# Patient Record
Sex: MALE | Race: WHITE | NOT HISPANIC OR LATINO
[De-identification: names, ages, dates, MRNs, and addresses within clinical notes are randomized per-mention and may not be internally consistent; named-entity substitution may affect disease eponyms.]

---

## 2020-02-28 ENCOUNTER — APPOINTMENT (OUTPATIENT)
Dept: INTERNAL MEDICINE | Facility: CLINIC | Age: 65
End: 2020-02-28

## 2020-04-28 ENCOUNTER — APPOINTMENT (OUTPATIENT)
Dept: INTERNAL MEDICINE | Facility: CLINIC | Age: 65
End: 2020-04-28

## 2020-05-13 ENCOUNTER — APPOINTMENT (OUTPATIENT)
Dept: INTERNAL MEDICINE | Facility: CLINIC | Age: 65
End: 2020-05-13
Payer: MEDICARE

## 2020-05-13 ENCOUNTER — NON-APPOINTMENT (OUTPATIENT)
Age: 65
End: 2020-05-13

## 2020-05-13 VITALS
WEIGHT: 187 LBS | TEMPERATURE: 99.2 F | HEART RATE: 109 BPM | SYSTOLIC BLOOD PRESSURE: 139 MMHG | RESPIRATION RATE: 12 BRPM | OXYGEN SATURATION: 95 % | DIASTOLIC BLOOD PRESSURE: 80 MMHG | HEIGHT: 67 IN | BODY MASS INDEX: 29.35 KG/M2

## 2020-05-13 DIAGNOSIS — Z00.00 ENCOUNTER FOR GENERAL ADULT MEDICAL EXAMINATION W/OUT ABNORMAL FINDINGS: ICD-10-CM

## 2020-05-13 PROCEDURE — 99213 OFFICE O/P EST LOW 20 MIN: CPT

## 2020-05-13 NOTE — HISTORY OF PRESENT ILLNESS
[de-identified] : 64 year old male with h/o MS 1986 ( currently off  Aubagio) >following with  neurologist  Dr. García/ BPH / elevated PSA >following with urologist Dr. Awad   Q 6 months / family history Colon cancer >last colonoscopy  2 years ago ( he undergoes screening colonoscopy  does Q 3 years\par Pt reports COVID type symptoms 3 months ago > he tested positive for COVID abs  1 week ago \par He had pneumonia vaccine 2019 \par Zostavax 2016\par Pt reports intermittent chest discomfort, + associated PEARSON, palpitation . He denies dizziness, N, V, abdominal pain , CP at present

## 2020-05-13 NOTE — PHYSICAL EXAM
[No Acute Distress] : no acute distress [Well Developed] : well developed [Well Nourished] : well nourished [Well-Appearing] : well-appearing [EOMI] : extraocular movements intact [Normal Sclera/Conjunctiva] : normal sclera/conjunctiva [Normal Oropharynx] : the oropharynx was normal [Normal Outer Ear/Nose] : the outer ears and nose were normal in appearance [Normal TMs] : both tympanic membranes were normal [No JVD] : no jugular venous distention [No Lymphadenopathy] : no lymphadenopathy [Supple] : supple [No Respiratory Distress] : no respiratory distress  [No Accessory Muscle Use] : no accessory muscle use [Clear to Auscultation] : lungs were clear to auscultation bilaterally [No Murmur] : no murmur heard [Normal S1, S2] : normal S1 and S2 [Regular Rhythm] : with a regular rhythm [No Carotid Bruits] : no carotid bruits [Pedal Pulses Present] : the pedal pulses are present [No Edema] : there was no peripheral edema [Non-distended] : non-distended [Soft] : abdomen soft [Non Tender] : non-tender [No HSM] : no HSM [Normal Posterior Cervical Nodes] : no posterior cervical lymphadenopathy [Normal Bowel Sounds] : normal bowel sounds [Normal Anterior Cervical Nodes] : no anterior cervical lymphadenopathy [No CVA Tenderness] : no CVA  tenderness [No Spinal Tenderness] : no spinal tenderness [Grossly Normal Strength/Tone] : grossly normal strength/tone [No Joint Swelling] : no joint swelling [No Focal Deficits] : no focal deficits [No Rash] : no rash [Normal Gait] : normal gait [Normal Insight/Judgement] : insight and judgment were intact [de-identified] : + mild tachycardia  [Normal Affect] : the affect was normal

## 2020-05-13 NOTE — ASSESSMENT
[FreeTextEntry1] : 1. see plan\par increase Metoprolol to 50 mg QD\par cardio referral \par continue Losartan/ HCTZ

## 2020-05-15 ENCOUNTER — TRANSCRIPTION ENCOUNTER (OUTPATIENT)
Age: 65
End: 2020-05-15

## 2020-05-20 ENCOUNTER — APPOINTMENT (OUTPATIENT)
Dept: INTERNAL MEDICINE | Facility: CLINIC | Age: 65
End: 2020-05-20
Payer: MEDICARE

## 2020-05-20 VITALS
HEIGHT: 67 IN | SYSTOLIC BLOOD PRESSURE: 122 MMHG | TEMPERATURE: 98.8 F | RESPIRATION RATE: 12 BRPM | DIASTOLIC BLOOD PRESSURE: 76 MMHG | HEART RATE: 95 BPM | WEIGHT: 191 LBS | OXYGEN SATURATION: 97 % | BODY MASS INDEX: 29.98 KG/M2

## 2020-05-20 PROCEDURE — 99214 OFFICE O/P EST MOD 30 MIN: CPT

## 2020-05-20 NOTE — ASSESSMENT
[FreeTextEntry1] : 1.Htn/ PEARSON\par low Na diet\par continue Losartan/ HCTZ / Metoprolol\par awaiting cardio evaluation\par 2. Elevated PSA\par urology follow up\par 3. Elevated Hb A1C\par need low carb diet/ exercise\par 4. Low D\par take D 1000 u/d

## 2020-05-20 NOTE — PHYSICAL EXAM
[No Acute Distress] : no acute distress [Well Developed] : well developed [Well Nourished] : well nourished [Well-Appearing] : well-appearing [Normal Sclera/Conjunctiva] : normal sclera/conjunctiva [EOMI] : extraocular movements intact [Normal Oropharynx] : the oropharynx was normal [Normal Outer Ear/Nose] : the outer ears and nose were normal in appearance [No JVD] : no jugular venous distention [Normal TMs] : both tympanic membranes were normal [No Lymphadenopathy] : no lymphadenopathy [Supple] : supple [No Accessory Muscle Use] : no accessory muscle use [No Respiratory Distress] : no respiratory distress  [Regular Rhythm] : with a regular rhythm [Clear to Auscultation] : lungs were clear to auscultation bilaterally [Normal S1, S2] : normal S1 and S2 [No Murmur] : no murmur heard [Pedal Pulses Present] : the pedal pulses are present [No Carotid Bruits] : no carotid bruits [No Edema] : there was no peripheral edema [Soft] : abdomen soft [Non Tender] : non-tender [Non-distended] : non-distended [No HSM] : no HSM [Normal Posterior Cervical Nodes] : no posterior cervical lymphadenopathy [Normal Bowel Sounds] : normal bowel sounds [No CVA Tenderness] : no CVA  tenderness [Normal Anterior Cervical Nodes] : no anterior cervical lymphadenopathy [No Spinal Tenderness] : no spinal tenderness [No Joint Swelling] : no joint swelling [Grossly Normal Strength/Tone] : grossly normal strength/tone [No Rash] : no rash [No Focal Deficits] : no focal deficits [Normal Gait] : normal gait [Normal Insight/Judgement] : insight and judgment were intact [Normal Affect] : the affect was normal [de-identified] : + mild tachycardia

## 2020-05-29 LAB
25(OH)D3 SERPL-MCNC: 23.1 NG/ML
ALBUMIN SERPL ELPH-MCNC: 4.5 G/DL
ALP BLD-CCNC: 60 U/L
ALT SERPL-CCNC: 25 U/L
ANION GAP SERPL CALC-SCNC: 13 MMOL/L
APPEARANCE: CLEAR
AST SERPL-CCNC: 18 U/L
BACTERIA: NEGATIVE
BASOPHILS # BLD AUTO: 0.06 K/UL
BASOPHILS NFR BLD AUTO: 0.9 %
BILIRUB SERPL-MCNC: 0.8 MG/DL
BILIRUBIN URINE: NEGATIVE
BLOOD URINE: NEGATIVE
BUN SERPL-MCNC: 17 MG/DL
CALCIUM SERPL-MCNC: 9.6 MG/DL
CHLORIDE SERPL-SCNC: 99 MMOL/L
CHOLEST SERPL-MCNC: 186 MG/DL
CHOLEST/HDLC SERPL: 3 RATIO
CO2 SERPL-SCNC: 28 MMOL/L
COLOR: YELLOW
CREAT SERPL-MCNC: 0.88 MG/DL
EOSINOPHIL # BLD AUTO: 0.12 K/UL
EOSINOPHIL NFR BLD AUTO: 1.8 %
ESTIMATED AVERAGE GLUCOSE: 120 MG/DL
GLUCOSE QUALITATIVE U: NEGATIVE
GLUCOSE SERPL-MCNC: 92 MG/DL
HBA1C MFR BLD HPLC: 5.8 %
HCT VFR BLD CALC: 41.5 %
HDLC SERPL-MCNC: 62 MG/DL
HGB BLD-MCNC: 12.4 G/DL
HYALINE CASTS: 0 /LPF
IMM GRANULOCYTES NFR BLD AUTO: 0.4 %
KETONES URINE: NEGATIVE
LDLC SERPL CALC-MCNC: 95 MG/DL
LEUKOCYTE ESTERASE URINE: NEGATIVE
LYMPHOCYTES # BLD AUTO: 1.61 K/UL
LYMPHOCYTES NFR BLD AUTO: 23.9 %
MAN DIFF?: NORMAL
MCHC RBC-ENTMCNC: 19.6 PG
MCHC RBC-ENTMCNC: 29.9 GM/DL
MCV RBC AUTO: 65.5 FL
MICROSCOPIC-UA: NORMAL
MONOCYTES # BLD AUTO: 0.7 K/UL
MONOCYTES NFR BLD AUTO: 10.4 %
NEUTROPHILS # BLD AUTO: 4.22 K/UL
NEUTROPHILS NFR BLD AUTO: 62.6 %
NITRITE URINE: NEGATIVE
PH URINE: 6.5
PLATELET # BLD AUTO: 218 K/UL
POTASSIUM SERPL-SCNC: 4.8 MMOL/L
PROT SERPL-MCNC: 6.8 G/DL
PROTEIN URINE: NEGATIVE
PSA SERPL-MCNC: 9.3 NG/ML
RBC # BLD: 6.34 M/UL
RBC # FLD: 19.1 %
RED BLOOD CELLS URINE: 3 /HPF
SODIUM SERPL-SCNC: 140 MMOL/L
SPECIFIC GRAVITY URINE: 1.02
SQUAMOUS EPITHELIAL CELLS: 0 /HPF
TRIGL SERPL-MCNC: 146 MG/DL
TSH SERPL-ACNC: 0.66 UIU/ML
UROBILINOGEN URINE: NORMAL
VIT B12 SERPL-MCNC: 814 PG/ML
WBC # FLD AUTO: 6.74 K/UL
WHITE BLOOD CELLS URINE: 1 /HPF

## 2020-06-03 ENCOUNTER — APPOINTMENT (OUTPATIENT)
Dept: CARDIOLOGY | Facility: CLINIC | Age: 65
End: 2020-06-03
Payer: MEDICARE

## 2020-06-03 ENCOUNTER — NON-APPOINTMENT (OUTPATIENT)
Age: 65
End: 2020-06-03

## 2020-06-03 VITALS
WEIGHT: 190 LBS | HEIGHT: 67 IN | OXYGEN SATURATION: 97 % | SYSTOLIC BLOOD PRESSURE: 142 MMHG | HEART RATE: 97 BPM | BODY MASS INDEX: 29.82 KG/M2 | TEMPERATURE: 98.1 F | DIASTOLIC BLOOD PRESSURE: 90 MMHG | RESPIRATION RATE: 12 BRPM

## 2020-06-03 VITALS — SYSTOLIC BLOOD PRESSURE: 122 MMHG | DIASTOLIC BLOOD PRESSURE: 86 MMHG

## 2020-06-03 DIAGNOSIS — R00.0 TACHYCARDIA, UNSPECIFIED: ICD-10-CM

## 2020-06-03 PROCEDURE — 99205 OFFICE O/P NEW HI 60 MIN: CPT

## 2020-06-03 NOTE — REASON FOR VISIT
[Chest Pain] : chest pain [Initial Evaluation] : an initial evaluation of [Dyspnea] : dyspnea [Hyperlipidemia] : hyperlipidemia [Hypertension] : hypertension

## 2020-06-03 NOTE — DISCUSSION/SUMMARY
[FreeTextEntry1] : The patient is a 64-year-old gentleman multiple sclerosis, hypertension, tachycardia, with progressive dyspnea, atypical chest pain, and ectopy.\par #1 CV- ECG normal, echo and nuclear stress test\par #2 Htn- losartan and HCTZ\par #3 Tachycardia- toprol 25mg\par #4 MS- not on medication secondary to cost, does walk with cane for precaution but can walk.

## 2020-06-03 NOTE — HISTORY OF PRESENT ILLNESS
[FreeTextEntry1] : Steven is a 64-year-old gentleman multiple sclerosis, elevated PSA, hypertension, tachycardia who presents with progressive shortness over the last month. Also noticed tachycardia for years. He occasionally gets a left sided nonradiating chest pain without associated symptoms lasting about 5 minutes. Worse when laid down. Feels skipped heart beats.

## 2020-06-03 NOTE — PHYSICAL EXAM
[Well Groomed] : well groomed [General Appearance - Well Developed] : well developed [Normal Appearance] : normal appearance [General Appearance - Well Nourished] : well nourished [No Deformities] : no deformities [General Appearance - In No Acute Distress] : no acute distress [Normal Conjunctiva] : the conjunctiva exhibited no abnormalities [Eyelids - No Xanthelasma] : the eyelids demonstrated no xanthelasmas [No Oral Cyanosis] : no oral cyanosis [Normal Oral Mucosa] : normal oral mucosa [No Oral Pallor] : no oral pallor [Normal Jugular Venous V Waves Present] : normal jugular venous V waves present [Normal Jugular Venous A Waves Present] : normal jugular venous A waves present [Heart Rate And Rhythm] : heart rate and rhythm were normal [No Jugular Venous Toussaint A Waves] : no jugular venous toussaint A waves [Murmurs] : no murmurs present [Heart Sounds] : normal S1 and S2 [Respiration, Rhythm And Depth] : normal respiratory rhythm and effort [Auscultation Breath Sounds / Voice Sounds] : lungs were clear to auscultation bilaterally [Exaggerated Use Of Accessory Muscles For Inspiration] : no accessory muscle use [Abdomen Soft] : soft [Abdomen Tenderness] : non-tender [Abdomen Mass (___ Cm)] : no abdominal mass palpated [Abnormal Walk] : normal gait [Gait - Sufficient For Exercise Testing] : the gait was sufficient for exercise testing [Cyanosis, Localized] : no localized cyanosis [Nail Clubbing] : no clubbing of the fingernails [Petechial Hemorrhages (___cm)] : no petechial hemorrhages [Skin Color & Pigmentation] : normal skin color and pigmentation [] : no rash [No Venous Stasis] : no venous stasis [Skin Lesions] : no skin lesions [No Skin Ulcers] : no skin ulcer [No Xanthoma] : no  xanthoma was observed [Affect] : the affect was normal [Oriented To Time, Place, And Person] : oriented to person, place, and time [Mood] : the mood was normal [No Anxiety] : not feeling anxious

## 2020-07-15 ENCOUNTER — APPOINTMENT (OUTPATIENT)
Dept: CARDIOLOGY | Facility: CLINIC | Age: 65
End: 2020-07-15
Payer: MEDICARE

## 2020-07-15 PROCEDURE — 93015 CV STRESS TEST SUPVJ I&R: CPT

## 2020-07-15 PROCEDURE — A9500: CPT

## 2020-07-15 PROCEDURE — 78452 HT MUSCLE IMAGE SPECT MULT: CPT

## 2020-07-15 PROCEDURE — 93306 TTE W/DOPPLER COMPLETE: CPT

## 2020-08-11 ENCOUNTER — APPOINTMENT (OUTPATIENT)
Dept: INTERNAL MEDICINE | Facility: CLINIC | Age: 65
End: 2020-08-11

## 2020-08-20 ENCOUNTER — APPOINTMENT (OUTPATIENT)
Dept: INTERNAL MEDICINE | Facility: CLINIC | Age: 65
End: 2020-08-20
Payer: MEDICARE

## 2020-08-20 VITALS
SYSTOLIC BLOOD PRESSURE: 137 MMHG | BODY MASS INDEX: 27 KG/M2 | HEART RATE: 75 BPM | DIASTOLIC BLOOD PRESSURE: 82 MMHG | TEMPERATURE: 97.3 F | WEIGHT: 172 LBS | RESPIRATION RATE: 12 BRPM | HEIGHT: 67 IN | OXYGEN SATURATION: 97 %

## 2020-08-20 PROCEDURE — 99214 OFFICE O/P EST MOD 30 MIN: CPT

## 2020-08-20 NOTE — PHYSICAL EXAM
[No Acute Distress] : no acute distress [Well Nourished] : well nourished [Well Developed] : well developed [Well-Appearing] : well-appearing [Normal Sclera/Conjunctiva] : normal sclera/conjunctiva [EOMI] : extraocular movements intact [Normal Outer Ear/Nose] : the outer ears and nose were normal in appearance [Normal Oropharynx] : the oropharynx was normal [Normal TMs] : both tympanic membranes were normal [No JVD] : no jugular venous distention [No Respiratory Distress] : no respiratory distress  [Supple] : supple [No Lymphadenopathy] : no lymphadenopathy [Clear to Auscultation] : lungs were clear to auscultation bilaterally [No Accessory Muscle Use] : no accessory muscle use [Normal S1, S2] : normal S1 and S2 [Regular Rhythm] : with a regular rhythm [No Murmur] : no murmur heard [No Carotid Bruits] : no carotid bruits [Pedal Pulses Present] : the pedal pulses are present [Soft] : abdomen soft [No Edema] : there was no peripheral edema [Non Tender] : non-tender [No HSM] : no HSM [Non-distended] : non-distended [Normal Anterior Cervical Nodes] : no anterior cervical lymphadenopathy [Normal Bowel Sounds] : normal bowel sounds [Normal Posterior Cervical Nodes] : no posterior cervical lymphadenopathy [No Spinal Tenderness] : no spinal tenderness [No Joint Swelling] : no joint swelling [No CVA Tenderness] : no CVA  tenderness [Grossly Normal Strength/Tone] : grossly normal strength/tone [No Rash] : no rash [No Focal Deficits] : no focal deficits [Normal Gait] : normal gait [Normal Affect] : the affect was normal [Normal Insight/Judgement] : insight and judgment were intact

## 2020-08-20 NOTE — HISTORY OF PRESENT ILLNESS
[de-identified] : 64 year old male with h/o MS 1986 / BPH / elevated PSA  / Pre diabetes presents for follow up on chronic problems . \par He is doing well, physically active, he lost 11 lbs since last visit \par He denies CP/ SOB /dizziness, N, V, abdominal pain \par Had stress test last month Nl \par Pt following with urologist for elevated PSA \par MS stable

## 2020-08-20 NOTE — ASSESSMENT
[FreeTextEntry1] : 1.Htn\par low Na diet\par continue Losartan/ HCTZ / Metoprolol\par 2. Elevated PSA\par urology follow up\par 3. Elevated Hb A1C\par need low carb diet/ exercise\par Hb A1C \par 4. Low D\par take D 1000 u/d

## 2020-08-26 LAB
25(OH)D3 SERPL-MCNC: 38.1 NG/ML
ALBUMIN SERPL ELPH-MCNC: 4.6 G/DL
ALP BLD-CCNC: 48 U/L
ALT SERPL-CCNC: 17 U/L
ANION GAP SERPL CALC-SCNC: 12 MMOL/L
AST SERPL-CCNC: 14 U/L
BASOPHILS # BLD AUTO: 0.05 K/UL
BASOPHILS NFR BLD AUTO: 1 %
BILIRUB SERPL-MCNC: 0.8 MG/DL
BUN SERPL-MCNC: 13 MG/DL
CALCIUM SERPL-MCNC: 9.9 MG/DL
CHLORIDE SERPL-SCNC: 102 MMOL/L
CHOLEST SERPL-MCNC: 212 MG/DL
CHOLEST/HDLC SERPL: 4.2 RATIO
CO2 SERPL-SCNC: 27 MMOL/L
CREAT SERPL-MCNC: 0.9 MG/DL
EOSINOPHIL # BLD AUTO: 0.1 K/UL
EOSINOPHIL NFR BLD AUTO: 1.9 %
ESTIMATED AVERAGE GLUCOSE: 108 MG/DL
GLUCOSE SERPL-MCNC: 105 MG/DL
HBA1C MFR BLD HPLC: 5.4 %
HCT VFR BLD CALC: 38.5 %
HDLC SERPL-MCNC: 50 MG/DL
HGB BLD-MCNC: 12 G/DL
IMM GRANULOCYTES NFR BLD AUTO: 0.2 %
LDLC SERPL CALC-MCNC: 139 MG/DL
LYMPHOCYTES # BLD AUTO: 1.29 K/UL
LYMPHOCYTES NFR BLD AUTO: 25.1 %
MAN DIFF?: NORMAL
MCHC RBC-ENTMCNC: 20.1 PG
MCHC RBC-ENTMCNC: 31.2 GM/DL
MCV RBC AUTO: 64.5 FL
MONOCYTES # BLD AUTO: 0.53 K/UL
MONOCYTES NFR BLD AUTO: 10.3 %
NEUTROPHILS # BLD AUTO: 3.16 K/UL
NEUTROPHILS NFR BLD AUTO: 61.5 %
PLATELET # BLD AUTO: 199 K/UL
POTASSIUM SERPL-SCNC: 4.2 MMOL/L
PROT SERPL-MCNC: 6.6 G/DL
RBC # BLD: 5.97 M/UL
RBC # FLD: 16.7 %
SODIUM SERPL-SCNC: 140 MMOL/L
TRIGL SERPL-MCNC: 116 MG/DL
WBC # FLD AUTO: 5.14 K/UL

## 2020-09-10 ENCOUNTER — RX RENEWAL (OUTPATIENT)
Age: 65
End: 2020-09-10

## 2020-11-25 ENCOUNTER — APPOINTMENT (OUTPATIENT)
Dept: INTERNAL MEDICINE | Facility: CLINIC | Age: 65
End: 2020-11-25
Payer: MEDICARE

## 2020-11-25 VITALS
HEART RATE: 84 BPM | WEIGHT: 166.38 LBS | DIASTOLIC BLOOD PRESSURE: 69 MMHG | TEMPERATURE: 97.3 F | SYSTOLIC BLOOD PRESSURE: 111 MMHG | BODY MASS INDEX: 26.11 KG/M2 | OXYGEN SATURATION: 99 % | HEIGHT: 67 IN | RESPIRATION RATE: 12 BRPM

## 2020-11-25 PROCEDURE — 99214 OFFICE O/P EST MOD 30 MIN: CPT

## 2020-11-25 PROCEDURE — 99072 ADDL SUPL MATRL&STAF TM PHE: CPT

## 2020-12-01 LAB
ALBUMIN SERPL ELPH-MCNC: 4.3 G/DL
ALP BLD-CCNC: 59 U/L
ALT SERPL-CCNC: 12 U/L
ANION GAP SERPL CALC-SCNC: 11 MMOL/L
AST SERPL-CCNC: 18 U/L
BILIRUB SERPL-MCNC: 0.8 MG/DL
BUN SERPL-MCNC: 22 MG/DL
CALCIUM SERPL-MCNC: 9.7 MG/DL
CHLORIDE SERPL-SCNC: 99 MMOL/L
CHOLEST SERPL-MCNC: 170 MG/DL
CO2 SERPL-SCNC: 27 MMOL/L
CREAT SERPL-MCNC: 0.99 MG/DL
ESTIMATED AVERAGE GLUCOSE: 108 MG/DL
GLUCOSE SERPL-MCNC: 79 MG/DL
HBA1C MFR BLD HPLC: 5.4 %
HDLC SERPL-MCNC: 68 MG/DL
LDLC SERPL CALC-MCNC: 94 MG/DL
NONHDLC SERPL-MCNC: 102 MG/DL
POTASSIUM SERPL-SCNC: 4.3 MMOL/L
PROT SERPL-MCNC: 7.1 G/DL
SARS-COV-2 IGG SERPL IA-ACNC: 12.1 INDEX
SARS-COV-2 IGG SERPL QL IA: POSITIVE
SODIUM SERPL-SCNC: 138 MMOL/L
TRIGL SERPL-MCNC: 42 MG/DL

## 2020-12-04 DIAGNOSIS — M17.10 UNILATERAL PRIMARY OSTEOARTHRITIS, UNSPECIFIED KNEE: ICD-10-CM

## 2020-12-07 NOTE — PHYSICAL EXAM
[No Acute Distress] : no acute distress [Well Nourished] : well nourished [Well Developed] : well developed [Well-Appearing] : well-appearing [Normal Sclera/Conjunctiva] : normal sclera/conjunctiva [EOMI] : extraocular movements intact [Normal Outer Ear/Nose] : the outer ears and nose were normal in appearance [Normal Oropharynx] : the oropharynx was normal [Normal TMs] : both tympanic membranes were normal [No JVD] : no jugular venous distention [No Lymphadenopathy] : no lymphadenopathy [Supple] : supple [No Respiratory Distress] : no respiratory distress  [No Accessory Muscle Use] : no accessory muscle use [Clear to Auscultation] : lungs were clear to auscultation bilaterally [Normal Rate] : normal rate  [Regular Rhythm] : with a regular rhythm [Normal S1, S2] : normal S1 and S2 [No Murmur] : no murmur heard [No Carotid Bruits] : no carotid bruits [Pedal Pulses Present] : the pedal pulses are present [No Edema] : there was no peripheral edema [Soft] : abdomen soft [Non Tender] : non-tender [Non-distended] : non-distended [No Masses] : no abdominal mass palpated [Normal Bowel Sounds] : normal bowel sounds [Normal Posterior Cervical Nodes] : no posterior cervical lymphadenopathy [Normal Anterior Cervical Nodes] : no anterior cervical lymphadenopathy [No CVA Tenderness] : no CVA  tenderness [No Spinal Tenderness] : no spinal tenderness [Grossly Normal Strength/Tone] : grossly normal strength/tone [No Rash] : no rash [Coordination Grossly Intact] : coordination grossly intact [No Focal Deficits] : no focal deficits [Normal Gait] : normal gait [Normal Affect] : the affect was normal [Normal Insight/Judgement] : insight and judgment were intact [de-identified] : + RT knee tenderness with ROM

## 2020-12-09 ENCOUNTER — APPOINTMENT (OUTPATIENT)
Dept: INTERNAL MEDICINE | Facility: CLINIC | Age: 65
End: 2020-12-09
Payer: MEDICARE

## 2020-12-09 VITALS
HEIGHT: 67 IN | TEMPERATURE: 97.3 F | WEIGHT: 162 LBS | DIASTOLIC BLOOD PRESSURE: 82 MMHG | SYSTOLIC BLOOD PRESSURE: 126 MMHG | RESPIRATION RATE: 12 BRPM | HEART RATE: 86 BPM | OXYGEN SATURATION: 98 % | BODY MASS INDEX: 25.43 KG/M2

## 2020-12-09 DIAGNOSIS — M25.561 PAIN IN RIGHT KNEE: ICD-10-CM

## 2020-12-09 DIAGNOSIS — Z23 ENCOUNTER FOR IMMUNIZATION: ICD-10-CM

## 2020-12-09 PROCEDURE — 99214 OFFICE O/P EST MOD 30 MIN: CPT

## 2020-12-09 PROCEDURE — 99072 ADDL SUPL MATRL&STAF TM PHE: CPT

## 2020-12-09 NOTE — PHYSICAL EXAM
[No Acute Distress] : no acute distress [Well Nourished] : well nourished [Well Developed] : well developed [Well-Appearing] : well-appearing [Normal Sclera/Conjunctiva] : normal sclera/conjunctiva [EOMI] : extraocular movements intact [Normal Outer Ear/Nose] : the outer ears and nose were normal in appearance [Normal Oropharynx] : the oropharynx was normal [Normal TMs] : both tympanic membranes were normal [No JVD] : no jugular venous distention [No Lymphadenopathy] : no lymphadenopathy [Supple] : supple [No Respiratory Distress] : no respiratory distress  [No Accessory Muscle Use] : no accessory muscle use [Clear to Auscultation] : lungs were clear to auscultation bilaterally [Normal Rate] : normal rate  [Regular Rhythm] : with a regular rhythm [Normal S1, S2] : normal S1 and S2 [No Murmur] : no murmur heard [No Carotid Bruits] : no carotid bruits [Pedal Pulses Present] : the pedal pulses are present [No Edema] : there was no peripheral edema [Soft] : abdomen soft [Non Tender] : non-tender [Non-distended] : non-distended [No Masses] : no abdominal mass palpated [Normal Bowel Sounds] : normal bowel sounds [Normal Posterior Cervical Nodes] : no posterior cervical lymphadenopathy [Normal Anterior Cervical Nodes] : no anterior cervical lymphadenopathy [No CVA Tenderness] : no CVA  tenderness [No Spinal Tenderness] : no spinal tenderness [Grossly Normal Strength/Tone] : grossly normal strength/tone [No Rash] : no rash [Coordination Grossly Intact] : coordination grossly intact [No Focal Deficits] : no focal deficits [Normal Gait] : normal gait [Normal Affect] : the affect was normal [Normal Insight/Judgement] : insight and judgment were intact [de-identified] : +mild  RT knee tenderness with ROM

## 2020-12-09 NOTE — HISTORY OF PRESENT ILLNESS
[de-identified] : 65 year old male with h/o MS / BPH / Pre diabetes presents for follow up on Rt knee pain and lab results.\par He is doing better, RT knee improving on Meloxicam . Pain mild to moderate at present, he takes Meloxicam prn for pain \par He is otherwise denies CP/SOB, dizziness, abd pain \par Pt had pneumonia vaccine  Prevnar 13 2019 with  Dr. Zhou\par Shingle vaccine 2016 \par

## 2020-12-09 NOTE — PLAN
[FreeTextEntry1] : 1. Rt knee pain\par Meloxicam 7.5 mg QD prn\par awaiting ortho evaluation\par 2. Hld\par need low fat/ low cholesterol diet / exercise / repeat fasting lipids in 1 month\par 3. Elevated PSA\par urology follow up \par 4. see plan \par All labs d/w pt

## 2020-12-16 ENCOUNTER — APPOINTMENT (OUTPATIENT)
Dept: INTERNAL MEDICINE | Facility: CLINIC | Age: 65
End: 2020-12-16
Payer: MEDICARE

## 2020-12-16 PROCEDURE — 90732 PPSV23 VACC 2 YRS+ SUBQ/IM: CPT

## 2020-12-16 PROCEDURE — G0009: CPT

## 2020-12-16 PROCEDURE — 99072 ADDL SUPL MATRL&STAF TM PHE: CPT

## 2021-01-11 ENCOUNTER — APPOINTMENT (OUTPATIENT)
Dept: ORTHOPEDIC SURGERY | Facility: CLINIC | Age: 66
End: 2021-01-11
Payer: MEDICARE

## 2021-01-11 VITALS — HEIGHT: 67 IN | WEIGHT: 165 LBS | BODY MASS INDEX: 25.9 KG/M2

## 2021-01-11 VITALS — DIASTOLIC BLOOD PRESSURE: 76 MMHG | HEART RATE: 80 BPM | SYSTOLIC BLOOD PRESSURE: 117 MMHG

## 2021-01-11 VITALS — TEMPERATURE: 97.7 F

## 2021-01-11 DIAGNOSIS — M22.2X1 PATELLOFEMORAL DISORDERS, RIGHT KNEE: ICD-10-CM

## 2021-01-11 DIAGNOSIS — D16.9 BENIGN NEOPLASM OF BONE AND ARTICULAR CARTILAGE, UNSPECIFIED: ICD-10-CM

## 2021-01-11 PROCEDURE — 99203 OFFICE O/P NEW LOW 30 MIN: CPT

## 2021-01-11 PROCEDURE — 73564 X-RAY EXAM KNEE 4 OR MORE: CPT | Mod: RT

## 2021-01-11 PROCEDURE — 99072 ADDL SUPL MATRL&STAF TM PHE: CPT

## 2021-01-12 PROBLEM — D16.9 OSTEOCHONDROMA: Status: ACTIVE | Noted: 2021-01-12

## 2021-01-12 PROBLEM — M22.2X1 PATELLOFEMORAL SYNDROME, RIGHT: Status: ACTIVE | Noted: 2021-01-12

## 2021-01-12 NOTE — PHYSICAL EXAM
[de-identified] : Oriented to time, place, person\par Mood: Normal\par Affect: Normal\par Appearance: Healthy, well appearing, no acute distress.\par Gait: Normal\par Assistive Devices: None \par \par Right Knee Exam:\par \par Skin: Clean, dry, intact\par Inspection: No obvious malalignment, no masses, no swelling, no effusion\par Pulses: 2+ DP/PT pulses \par ROM: 0-135 degrees of flexion. + anterior pain with deep knee flexion\par Tenderness: No MJLT. No LJLT. No pain over bony protuberance of the femoral metaphysis. No pain over the patella facets. No pain to the quadriceps tendon. No pain to the patella tendon. No posterior knee tenderness.\par Stability: Stable to varus, valgus. Negative Lachman testing. Negative anterior drawer, negative posterior drawer.\par Strength: 5/5 Q/H/TA/GS/EHL, without atrophy\par Neuro: Intact to light touch throughout, DTRs normal\par Additional Tests: Negative Dex's test, Negative patellar grind test  [de-identified] : Images were reviewed from [facility] dated 11.30.2020.\par \par 3 views of the right knee showed no acute fracture or dislocation. Osteochondroma of the distal femoral metaphysis. There is no medial, no lateral and mild patellofemoral degenerative changes seen. There is no significant malalignment. No significant other obvious osseous abnormality, otherwise unremarkable.

## 2021-01-12 NOTE — CONSULT LETTER
[Dear  ___] : Dear  [unfilled], [Consult Letter:] : I had the pleasure of evaluating your patient, [unfilled]. [Please see my note below.] : Please see my note below. [Consult Closing:] : Thank you very much for allowing me to participate in the care of this patient.  If you have any questions, please do not hesitate to contact me. [Sincerely,] : Sincerely, [FreeTextEntry3] : Vick Dumont MD\par ______________________________________________\par [facility]: Sports Medicine\par [address], [address], [place] [address]\par (t) [phone]\par (f) [phone]

## 2021-01-12 NOTE — DISCUSSION/SUMMARY
[de-identified] : 66 y/o male with right knee pain.\par \par Presentation is consistent with early degenerative change and arthrosis to the knee. Described that this is likely due to overuse, and age-related "wear and tear" within the anterior compartment and pain may be related to breakdown of the chondral surfaces within the patellofemoral joint.  Likely recent exacerbation with increased impact loading exercise with stair climbing.  Symptoms are currently improving.\par \par Patient also has a benign osteochondroma to the medial knee which appears to be asymptomatic on clinical examination.  This was discussed in detail with the patient, and no further routine follow-up needed at this time unless it becomes symptomatic.  Patient voiced understanding.\par \par Recommendation: Conservative care & observation; including rest/activity avoidance until less symptomatic with subsequent gradual return to full low impact activity as tolerated. Patient may also use OTC NSAIDs or acetaminophen as tolerated, with application of ice/heat to the area 2-3x daily for 20 minutes after periods of activity. \par \par Follow up as needed if pain persists for further evaluation and treatment.

## 2021-01-12 NOTE — HISTORY OF PRESENT ILLNESS
[de-identified] : CONSULTATION REPORT\par Referred by Dr. Collier for evaluation of right knee pain. \par \par 65 year old male presents today with right knee pain x 1 month. He aggravated his knee carrying tile up and down stairs a month ago. The pain has improved, brought on with walking and stairs. Localizes pain to the anterior aspect of the knee. Meloxicam and Diclofenac gel not helpful. Denies locking, catching, numbness or tingling. X-rays from [facility] radiology.  Reports that his pain has significantly improved since this irritation.\par \par The patient's past medical history, past surgical history, medications and allergies were reviewed by me today with the patient and documented accordingly. In addition, the patient's family and social history, which were noncontributory to this visit, were reviewed also.

## 2021-01-12 NOTE — ADDENDUM
[FreeTextEntry1] : This note was written by Mikayla Zazueta on 01/11/2021 acting solely as a scribe for Dr. Vick Dumont.\par \par All medical record entries made by the Scribe were at my, Dr. Vick Dumont, direction and personally dictated by me on 01/11/2021. I have personally reviewed the chart and agree that the record accurately reflects my personal performance of the history, physical exam, assessment and plan.

## 2021-02-02 ENCOUNTER — TRANSCRIPTION ENCOUNTER (OUTPATIENT)
Age: 66
End: 2021-02-02

## 2021-02-24 ENCOUNTER — NON-APPOINTMENT (OUTPATIENT)
Age: 66
End: 2021-02-24

## 2021-03-09 ENCOUNTER — APPOINTMENT (OUTPATIENT)
Dept: INTERNAL MEDICINE | Facility: CLINIC | Age: 66
End: 2021-03-09
Payer: MEDICARE

## 2021-03-09 ENCOUNTER — LABORATORY RESULT (OUTPATIENT)
Age: 66
End: 2021-03-09

## 2021-03-09 VITALS
HEART RATE: 110 BPM | DIASTOLIC BLOOD PRESSURE: 87 MMHG | TEMPERATURE: 97.3 F | BODY MASS INDEX: 25.74 KG/M2 | WEIGHT: 164 LBS | HEIGHT: 67 IN | RESPIRATION RATE: 12 BRPM | SYSTOLIC BLOOD PRESSURE: 125 MMHG | OXYGEN SATURATION: 98 %

## 2021-03-09 PROCEDURE — 99072 ADDL SUPL MATRL&STAF TM PHE: CPT

## 2021-03-09 PROCEDURE — 99214 OFFICE O/P EST MOD 30 MIN: CPT

## 2021-03-09 NOTE — HISTORY OF PRESENT ILLNESS
[FreeTextEntry1] : Dr. Zhou urol \par neuro for MS  [de-identified] : 65 year old male with h/o MS / BPH / Pre diabetes / Htn presents for follow up on chronic problems. \par He is doing well, denies CP/SOB, dizziness, abd pain.\par He is following with urologist for elevated PSA\par He is awaiting neurologist appointment to follow up on MS \par He is otherwise denies CP/SOB, dizziness, abd pain \par pt had COVID #1 Maderna 2 weeks ago > tolerated well\par Shingle vaccine 2016 \par

## 2021-03-09 NOTE — PHYSICAL EXAM
Airway  Urgency: elective    Airway not difficult    General Information and Staff    Patient location during procedure: OR  Anesthesiologist: BELEN HURLEY  CRNA: KARL GRAY    Indications and Patient Condition  Indications for airway management: airway protection    Preoxygenated: yes  MILS not maintained throughout  Mask difficulty assessment: 1 - vent by mask    Final Airway Details  Final airway type: endotracheal airway      Successful airway: ETT  Cuffed: yes   Successful intubation technique: direct laryngoscopy  Facilitating devices/methods: intubating stylet  Endotracheal tube insertion site: oral  Blade: Kyung  Blade size: #3  ETT size: 8.0 mm  Cormack-Lehane Classification: grade I - full view of glottis  Placement verified by: chest auscultation   Cuff volume (mL): 9  Measured from: lips  ETT to lips (cm): 24  Number of attempts at approach: 1    Additional Comments  Pre induction inspection of mouth revealed upper front teeth with numerous cracks and chips noted, staff witnessed, PreO2 100% face mask, IV induction, easy mask, DVL x1, cords noted, tube through, cuff up, EBBSH, +etCO2, = chest movement, tube secured in place, atraumatic, teeth and lips intact as preop.              [Well Nourished] : well nourished [Well Developed] : well developed [Well-Appearing] : well-appearing [Normal Sclera/Conjunctiva] : normal sclera/conjunctiva [EOMI] : extraocular movements intact [Normal Outer Ear/Nose] : the outer ears and nose were normal in appearance [Normal Oropharynx] : the oropharynx was normal [Normal TMs] : both tympanic membranes were normal [No JVD] : no jugular venous distention [No Lymphadenopathy] : no lymphadenopathy [Supple] : supple [No Respiratory Distress] : no respiratory distress  [No Accessory Muscle Use] : no accessory muscle use [Clear to Auscultation] : lungs were clear to auscultation bilaterally [Normal Rate] : normal rate  [Regular Rhythm] : with a regular rhythm [Normal S1, S2] : normal S1 and S2 [No Murmur] : no murmur heard [No Carotid Bruits] : no carotid bruits [Pedal Pulses Present] : the pedal pulses are present [No Edema] : there was no peripheral edema [Soft] : abdomen soft [Non Tender] : non-tender [Non-distended] : non-distended [No Masses] : no abdominal mass palpated [Normal Bowel Sounds] : normal bowel sounds [Normal Posterior Cervical Nodes] : no posterior cervical lymphadenopathy [Normal Anterior Cervical Nodes] : no anterior cervical lymphadenopathy [No CVA Tenderness] : no CVA  tenderness [No Spinal Tenderness] : no spinal tenderness [No Joint Swelling] : no joint swelling [Grossly Normal Strength/Tone] : grossly normal strength/tone [No Rash] : no rash [Coordination Grossly Intact] : coordination grossly intact [No Focal Deficits] : no focal deficits [Normal Gait] : normal gait [Normal Affect] : the affect was normal [Normal Insight/Judgement] : insight and judgment were intact

## 2021-03-15 LAB
ALBUMIN SERPL ELPH-MCNC: 4.5 G/DL
ALP BLD-CCNC: 57 U/L
ALT SERPL-CCNC: 13 U/L
ANION GAP SERPL CALC-SCNC: 11 MMOL/L
AST SERPL-CCNC: 19 U/L
BASOPHILS # BLD AUTO: 0.04 K/UL
BASOPHILS NFR BLD AUTO: 0.7 %
BILIRUB SERPL-MCNC: 1 MG/DL
BUN SERPL-MCNC: 17 MG/DL
CALCIUM SERPL-MCNC: 9.5 MG/DL
CHLORIDE SERPL-SCNC: 96 MMOL/L
CO2 SERPL-SCNC: 29 MMOL/L
CREAT SERPL-MCNC: 0.91 MG/DL
EOSINOPHIL # BLD AUTO: 0.06 K/UL
EOSINOPHIL NFR BLD AUTO: 1 %
ESTIMATED AVERAGE GLUCOSE: 111 MG/DL
GLUCOSE SERPL-MCNC: 98 MG/DL
HBA1C MFR BLD HPLC: 5.5 %
HCT VFR BLD CALC: 41 %
HGB BLD-MCNC: 12.7 G/DL
IMM GRANULOCYTES NFR BLD AUTO: 0.3 %
LYMPHOCYTES # BLD AUTO: 1.13 K/UL
LYMPHOCYTES NFR BLD AUTO: 18.7 %
MAN DIFF?: NORMAL
MCHC RBC-ENTMCNC: 19.6 PG
MCHC RBC-ENTMCNC: 31 GM/DL
MCV RBC AUTO: 63.2 FL
MONOCYTES # BLD AUTO: 0.53 K/UL
MONOCYTES NFR BLD AUTO: 8.8 %
NEUTROPHILS # BLD AUTO: 4.27 K/UL
NEUTROPHILS NFR BLD AUTO: 70.5 %
PLATELET # BLD AUTO: 210 K/UL
POTASSIUM SERPL-SCNC: 3.5 MMOL/L
PROT SERPL-MCNC: 7.3 G/DL
RBC # BLD: 6.49 M/UL
RBC # FLD: 18.1 %
SODIUM SERPL-SCNC: 136 MMOL/L
WBC # FLD AUTO: 6.05 K/UL

## 2021-04-23 LAB
CHOLEST SERPL-MCNC: 196 MG/DL
HDLC SERPL-MCNC: 73 MG/DL
LDLC SERPL CALC-MCNC: 110 MG/DL
NONHDLC SERPL-MCNC: 124 MG/DL
TRIGL SERPL-MCNC: 70 MG/DL

## 2021-05-04 ENCOUNTER — APPOINTMENT (OUTPATIENT)
Dept: GASTROENTEROLOGY | Facility: CLINIC | Age: 66
End: 2021-05-04
Payer: MEDICARE

## 2021-05-04 VITALS
WEIGHT: 171 LBS | BODY MASS INDEX: 26.84 KG/M2 | HEART RATE: 80 BPM | TEMPERATURE: 97.5 F | DIASTOLIC BLOOD PRESSURE: 83 MMHG | HEIGHT: 67 IN | SYSTOLIC BLOOD PRESSURE: 129 MMHG

## 2021-05-04 DIAGNOSIS — E66.3 OVERWEIGHT: ICD-10-CM

## 2021-05-04 DIAGNOSIS — K60.2 ANAL FISSURE, UNSPECIFIED: ICD-10-CM

## 2021-05-04 DIAGNOSIS — R19.5 OTHER FECAL ABNORMALITIES: ICD-10-CM

## 2021-05-04 DIAGNOSIS — Z86.010 PERSONAL HISTORY OF COLONIC POLYPS: ICD-10-CM

## 2021-05-04 PROCEDURE — 82274 ASSAY TEST FOR BLOOD FECAL: CPT | Mod: QW

## 2021-05-04 PROCEDURE — 99072 ADDL SUPL MATRL&STAF TM PHE: CPT

## 2021-05-04 PROCEDURE — 99204 OFFICE O/P NEW MOD 45 MIN: CPT

## 2021-05-04 RX ORDER — HYDROCORTISONE ACETATE 2.5 %
2.5 CREAM WITH PERINEAL APPLICATOR TOPICAL
Refills: 0 | Status: ACTIVE | COMMUNITY

## 2021-05-04 NOTE — REASON FOR VISIT
[Consultation] : a consultation visit [FreeTextEntry1] : Pre colonoscopy exam, history of colon cancer and anal fissure

## 2021-05-04 NOTE — PHYSICAL EXAM
[General Appearance - Alert] : alert [General Appearance - In No Acute Distress] : in no acute distress [General Appearance - Well Developed] : well developed [General Appearance - Well Nourished] : well nourished [Sclera] : the sclera and conjunctiva were normal [Neck Appearance] : the appearance of the neck was normal [Neck Cervical Mass (___cm)] : no neck mass was observed [Jugular Venous Distention Increased] : there was no jugular-venous distention [Thyroid Diffuse Enlargement] : the thyroid was not enlarged [Thyroid Nodule] : there were no palpable thyroid nodules [Auscultation Breath Sounds / Voice Sounds] : lungs were clear to auscultation bilaterally [Heart Rate And Rhythm] : heart rate was normal and rhythm regular [Heart Sounds] : normal S1 and S2 [Heart Sounds Gallop] : no gallops [Murmurs] : no murmurs [Heart Sounds Pericardial Friction Rub] : no pericardial rub [Full Pulse] : the pedal pulses are present [Edema] : there was no peripheral edema [Bowel Sounds] : normal bowel sounds [Abdomen Soft] : soft [Abdomen Tenderness] : non-tender [Abdomen Mass (___ Cm)] : no abdominal mass palpated [Abdomen Hernia] : no hernia was discovered [No Rectal Mass] : no rectal mass [Internal Hemorrhoid] : internal hemorrhoids [Occult Blood Positive] : stool positive for occult blood [Prostate Size___ (Scale 0-4)] : prostate size was [unfilled] on a scale of 0-4 [Cervical Lymph Nodes Enlarged Posterior Bilaterally] : posterior cervical [Cervical Lymph Nodes Enlarged Anterior Bilaterally] : anterior cervical [Supraclavicular Lymph Nodes Enlarged Bilaterally] : supraclavicular [Inguinal Lymph Nodes Enlarged Bilaterally] : inguinal [Abnormal Walk] : normal gait [Nail Clubbing] : no clubbing  or cyanosis of the fingernails [Musculoskeletal - Swelling] : no joint swelling seen [Skin Color & Pigmentation] : normal skin color and pigmentation [Skin Turgor] : normal skin turgor [] : no rash [Oriented To Time, Place, And Person] : oriented to person, place, and time [Impaired Insight] : insight and judgment were intact [Affect] : the affect was normal [External Hemorrhoid] : no external hemorrhoids [Prostate Tenderness] : was not tender [FreeTextEntry1] : multiple cherry angiomata

## 2021-05-04 NOTE — ASSESSMENT
[FreeTextEntry1] : 1.  Personal history of colonic polyps, last colonoscopy 2018; strong family history of colorectal cancer (mother, three maternal uncles) & polyps ( brother)--rule out metachronous colorectal neoplasia.  Heme positive stool on today's exam may be related to chronic posterior anal fissure.\par 2.  Recurrent anal fissure.\par 3.  Chronic anemia.\par 4.  Multiple sclerosis (weakness, balance issues), on no medicine.\par 5.  Overweight.\par 6.  Elevated PSA, BPH.\par 7.  Hypertension.\par 8.  Ex-smoker.\par 9.  Hypercholesterolemia.\par 10.  Obstructive sleep apnea, status post uvulectomy and T&A.\par \par Plan:\par 1.  Medical records reviewed.\par 2.  Record release for Dr. Evans.\par 3.  Add RectiCare to hydrocortisone cream.\par 4.  Agree with repeat colonoscopy--Procedure, rationale, anesthesia plan, and Suprep instructions were reviewed and brochure given.  Hold Metamucil for several days prior.\par

## 2021-05-04 NOTE — CONSULT LETTER
[Dear  ___] : Dear  [unfilled], [Consult Letter:] : I had the pleasure of evaluating your patient, [unfilled]. [Please see my note below.] : Please see my note below. [Consult Closing:] : Thank you very much for allowing me to participate in the care of this patient.  If you have any questions, please do not hesitate to contact me. [Sincerely,] : Sincerely, [FreeTextEntry3] : Kermit Anderson M.D.\par

## 2021-05-04 NOTE — HISTORY OF PRESENT ILLNESS
[FreeTextEntry1] : Because of strong family history of colorectal cancer, Steven has undergone a number of colonoscopies over the past three decades (Dr. Evans); last procedure was in 2018. On two occasions, polyps have been removed.  His mother was diagnosed with colon cancer in her 60s, two maternal uncles had colon cancer and another required colostomy for rectal cancer; another brother may have had colonic polyp, as well.  He reports long-standing issues with anal fissures, using Anucort cream as needed; he is more likely to have anorectal discomfort after passing hard stool, so has been taking Metamucil 2 tsp/day regularly.

## 2021-05-21 ENCOUNTER — APPOINTMENT (OUTPATIENT)
Dept: DISASTER EMERGENCY | Facility: CLINIC | Age: 66
End: 2021-05-21

## 2021-05-22 LAB — SARS-COV-2 N GENE NPH QL NAA+PROBE: NOT DETECTED

## 2021-05-26 ENCOUNTER — APPOINTMENT (OUTPATIENT)
Dept: GASTROENTEROLOGY | Facility: CLINIC | Age: 66
End: 2021-05-26
Payer: MEDICARE

## 2021-05-26 ENCOUNTER — LABORATORY RESULT (OUTPATIENT)
Age: 66
End: 2021-05-26

## 2021-05-26 PROCEDURE — 99072 ADDL SUPL MATRL&STAF TM PHE: CPT

## 2021-05-26 PROCEDURE — 45380 COLONOSCOPY AND BIOPSY: CPT | Mod: PT

## 2021-06-03 ENCOUNTER — NON-APPOINTMENT (OUTPATIENT)
Age: 66
End: 2021-06-03

## 2021-08-16 ENCOUNTER — NON-APPOINTMENT (OUTPATIENT)
Age: 66
End: 2021-08-16

## 2021-08-23 ENCOUNTER — APPOINTMENT (OUTPATIENT)
Dept: ORTHOPEDIC SURGERY | Facility: CLINIC | Age: 66
End: 2021-08-23
Payer: MEDICARE

## 2021-08-23 VITALS
DIASTOLIC BLOOD PRESSURE: 77 MMHG | HEIGHT: 67 IN | BODY MASS INDEX: 26.84 KG/M2 | HEART RATE: 73 BPM | WEIGHT: 171 LBS | SYSTOLIC BLOOD PRESSURE: 119 MMHG

## 2021-08-23 DIAGNOSIS — M18.12 UNILATERAL PRIMARY OSTEOARTHRITIS OF FIRST CARPOMETACARPAL JOINT, LEFT HAND: ICD-10-CM

## 2021-08-23 PROCEDURE — 99213 OFFICE O/P EST LOW 20 MIN: CPT

## 2021-08-23 PROCEDURE — 73130 X-RAY EXAM OF HAND: CPT | Mod: LT

## 2021-08-23 NOTE — HISTORY OF PRESENT ILLNESS
[de-identified] : Patient is a 65 year-old, right-hand-dominant male who presents to the office today with left thumb pain. The pain has been present for over 3 years, but has been worsening recently, especially when he does activities such as work around the house, gardening or yard work. The pain is located at the base of the left thumb and shoots into the tip of the thumb. It feels like "the nerve is being pressed on" and sometimes achy or sharp depending on what activity he is doing. The pain is activity related only. He notes that Tylenol does not help when he takes it, however, his pain is normally self limited and goes away the same day. He presents today for further treatment options.

## 2021-08-23 NOTE — PHYSICAL EXAM
[Normal] : Gait: normal [Rad] : radial 2+ and symmetric bilaterally [de-identified] : The patient has no respiratory distress. Mood and affect are normal. The patient is alert and oriented to person, place and time.\par Examination of the cervical spine demonstrates no tenderness, no deformity and no muscle spasm. Cervical spine rotation is 60° to the right, 60° to the left, 75° of extension and 45° of flexion. Neurologic exam of the upper extremities reveals intact sensation to light touch. Motor function is 5 over 5 in all groups. Deep tendon reflexes are 2+ and equal at the biceps, triceps and brachioradialis.\par Examination of the left shoulder demonstrates no swelling, no deformity and no tenderness. The shoulder is stable. Drop arm test is negative. Nolan test is negative. Liftoff test is negative. Motor strength is 5 over 5 in all groups. Range of motion is full and identical to that of the right shoulder. Flexion is 160°, abduction 160°, external rotation 45° and internal rotation to the lower thoracic level.\par The elbows are stable and nontender.  There is no pain with wrist range of motion.  There is tenderness on the left hand at the thumb basal joint.  Tendon function is intact.  There is no triggering.  Tinel signs are negative.  Finkelstein test is negative.  Capillary refill is brisk.  There is no lymphedema. [de-identified] : AP, lateral and oblique x-rays of the left hand taken today demonstrate degenerative changes at the left thumb carpometacarpal joint

## 2021-08-23 NOTE — DISCUSSION/SUMMARY
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  I have discussed the pathology, natural history and treatment options with him.  He will take over-the-counter medication.  If symptoms worsen he will consider splinting.  He will return as needed.

## 2021-09-21 ENCOUNTER — TRANSCRIPTION ENCOUNTER (OUTPATIENT)
Age: 66
End: 2021-09-21

## 2021-10-07 ENCOUNTER — APPOINTMENT (OUTPATIENT)
Dept: INTERNAL MEDICINE | Facility: CLINIC | Age: 66
End: 2021-10-07
Payer: MEDICARE

## 2021-10-07 ENCOUNTER — LABORATORY RESULT (OUTPATIENT)
Age: 66
End: 2021-10-07

## 2021-10-07 VITALS
SYSTOLIC BLOOD PRESSURE: 111 MMHG | BODY MASS INDEX: 26.84 KG/M2 | OXYGEN SATURATION: 97 % | RESPIRATION RATE: 12 BRPM | WEIGHT: 171 LBS | DIASTOLIC BLOOD PRESSURE: 76 MMHG | TEMPERATURE: 96.4 F | HEIGHT: 67 IN | HEART RATE: 88 BPM

## 2021-10-07 DIAGNOSIS — Z00.00 ENCOUNTER FOR GENERAL ADULT MEDICAL EXAMINATION W/OUT ABNORMAL FINDINGS: ICD-10-CM

## 2021-10-07 PROCEDURE — 99397 PER PM REEVAL EST PAT 65+ YR: CPT | Mod: 25

## 2021-10-07 PROCEDURE — 90662 IIV NO PRSV INCREASED AG IM: CPT

## 2021-10-07 PROCEDURE — G0008: CPT

## 2021-10-07 NOTE — PHYSICAL EXAM
[Well Nourished] : well nourished [Well Developed] : well developed [Well-Appearing] : well-appearing [Normal Sclera/Conjunctiva] : normal sclera/conjunctiva [EOMI] : extraocular movements intact [Normal Outer Ear/Nose] : the outer ears and nose were normal in appearance [Normal Oropharynx] : the oropharynx was normal [Normal TMs] : both tympanic membranes were normal [No JVD] : no jugular venous distention [No Lymphadenopathy] : no lymphadenopathy [Supple] : supple [No Respiratory Distress] : no respiratory distress  [No Accessory Muscle Use] : no accessory muscle use [Clear to Auscultation] : lungs were clear to auscultation bilaterally [Normal Rate] : normal rate  [Regular Rhythm] : with a regular rhythm [Normal S1, S2] : normal S1 and S2 [No Murmur] : no murmur heard [No Carotid Bruits] : no carotid bruits [Pedal Pulses Present] : the pedal pulses are present [No Edema] : there was no peripheral edema [Soft] : abdomen soft [Non Tender] : non-tender [Non-distended] : non-distended [No Masses] : no abdominal mass palpated [Normal Bowel Sounds] : normal bowel sounds [Normal Posterior Cervical Nodes] : no posterior cervical lymphadenopathy [Normal Anterior Cervical Nodes] : no anterior cervical lymphadenopathy [No CVA Tenderness] : no CVA  tenderness [No Spinal Tenderness] : no spinal tenderness [No Joint Swelling] : no joint swelling [Grossly Normal Strength/Tone] : grossly normal strength/tone [No Rash] : no rash [Coordination Grossly Intact] : coordination grossly intact [No Focal Deficits] : no focal deficits [Normal Gait] : normal gait [Normal Affect] : the affect was normal [Normal Insight/Judgement] : insight and judgment were intact

## 2021-10-08 LAB
25(OH)D3 SERPL-MCNC: 34.1 NG/ML
ALBUMIN SERPL ELPH-MCNC: 4.7 G/DL
ALP BLD-CCNC: 61 U/L
ALT SERPL-CCNC: 16 U/L
ANION GAP SERPL CALC-SCNC: 12 MMOL/L
AST SERPL-CCNC: 17 U/L
BILIRUB SERPL-MCNC: 0.9 MG/DL
BUN SERPL-MCNC: 14 MG/DL
CALCIUM SERPL-MCNC: 10 MG/DL
CHLORIDE SERPL-SCNC: 98 MMOL/L
CHOLEST SERPL-MCNC: 203 MG/DL
CO2 SERPL-SCNC: 28 MMOL/L
CREAT SERPL-MCNC: 0.88 MG/DL
ESTIMATED AVERAGE GLUCOSE: 111 MG/DL
GLUCOSE SERPL-MCNC: 97 MG/DL
HBA1C MFR BLD HPLC: 5.5 %
HDLC SERPL-MCNC: 63 MG/DL
LDLC SERPL CALC-MCNC: 119 MG/DL
NONHDLC SERPL-MCNC: 140 MG/DL
POTASSIUM SERPL-SCNC: 4 MMOL/L
PROT SERPL-MCNC: 7.4 G/DL
SODIUM SERPL-SCNC: 139 MMOL/L
TRIGL SERPL-MCNC: 103 MG/DL
TSH SERPL-ACNC: 0.61 UIU/ML
VIT B12 SERPL-MCNC: 837 PG/ML

## 2021-10-09 NOTE — PLAN
[FreeTextEntry1] : 1.Htn\par Losartan 100 mg QD\par HCTZ 25 mg QD\par 2. Hld\par need low fat/ low cholesterol diet / exercise / repeat fasting lipids in 1 month\par 3. Elevated PSA\par urology follow up \par 4. see plan \par All labs d/w pt

## 2021-10-09 NOTE — HISTORY OF PRESENT ILLNESS
[de-identified] : 66 year old male with h/o MS / BPH / Pre diabetes / Htn presents for annual physical \par He is following with urologist Dr. Valenzuela for BPH > last PSA 8.7 last week > pt had MRI prostate > reportedly Nl \par He is doing well, denies CP/SOB, dizziness, abd pain.\par

## 2021-10-21 LAB
BASOPHILS # BLD AUTO: 0.04 K/UL
BASOPHILS NFR BLD AUTO: 0.6 %
EOSINOPHIL # BLD AUTO: 0.13 K/UL
EOSINOPHIL NFR BLD AUTO: 1.9 %
HCT VFR BLD CALC: 41.1 %
HGB BLD-MCNC: 12.5 G/DL
IMM GRANULOCYTES NFR BLD AUTO: 0.4 %
LYMPHOCYTES # BLD AUTO: 1.34 K/UL
LYMPHOCYTES NFR BLD AUTO: 19.1 %
MAN DIFF?: NORMAL
MCHC RBC-ENTMCNC: 19.5 PG
MCHC RBC-ENTMCNC: 30.4 GM/DL
MCV RBC AUTO: 64.1 FL
MONOCYTES # BLD AUTO: 0.51 K/UL
MONOCYTES NFR BLD AUTO: 7.3 %
NEUTROPHILS # BLD AUTO: 4.95 K/UL
NEUTROPHILS NFR BLD AUTO: 70.7 %
PLATELET # BLD AUTO: 209 K/UL
RBC # BLD: 6.41 M/UL
RBC # FLD: 18.1 %
WBC # FLD AUTO: 7 K/UL

## 2021-12-14 ENCOUNTER — LABORATORY RESULT (OUTPATIENT)
Age: 66
End: 2021-12-14

## 2022-01-04 ENCOUNTER — APPOINTMENT (OUTPATIENT)
Dept: INTERNAL MEDICINE | Facility: CLINIC | Age: 67
End: 2022-01-04
Payer: MEDICARE

## 2022-01-04 ENCOUNTER — NON-APPOINTMENT (OUTPATIENT)
Age: 67
End: 2022-01-04

## 2022-01-04 ENCOUNTER — LABORATORY RESULT (OUTPATIENT)
Age: 67
End: 2022-01-04

## 2022-01-04 VITALS
DIASTOLIC BLOOD PRESSURE: 83 MMHG | OXYGEN SATURATION: 98 % | TEMPERATURE: 97.1 F | RESPIRATION RATE: 12 BRPM | HEART RATE: 103 BPM | BODY MASS INDEX: 28.04 KG/M2 | SYSTOLIC BLOOD PRESSURE: 123 MMHG | WEIGHT: 178.68 LBS | HEIGHT: 67 IN

## 2022-01-04 DIAGNOSIS — Z01.818 ENCOUNTER FOR OTHER PREPROCEDURAL EXAMINATION: ICD-10-CM

## 2022-01-04 PROCEDURE — 93000 ELECTROCARDIOGRAM COMPLETE: CPT

## 2022-01-04 PROCEDURE — 99214 OFFICE O/P EST MOD 30 MIN: CPT | Mod: 25

## 2022-01-04 NOTE — PHYSICAL EXAM
INTERVAL HISTORY:  No acute overnight events. She continues on RA with intermittent tachypnea to 60's and sats in mid 80's. Appears comfortable overall. PO intake is improving and is taking >50% PO and remaining through NG.     RESPIRATORY SUPPORT: RA     NUTRITION: PO/NG    Intake and output:     - @ 07:01  -   @ 07:00  --------------------------------------------------------  IN: 504 mL / OUT: 353 mL / NET: 151 mL    INTRAVASCULAR ACCESS: PIV    MEDICATIONS:  furosemide   Oral Liquid - Peds 3.4 milliGRAM(s) Oral every 8 hours    PHYSICAL EXAMINATION:  ICU Vital Signs Last 24 Hrs  T(C): 37.1 (20 May 2021 08:25), Max: 37.2 (19 May 2021 17:00)  T(F): 98.7 (20 May 2021 08:25), Max: 98.9 (19 May 2021 17:00)  HR: 148 (20 May 2021 08:25) (140 - 160)  BP: 79/47 (20 May 2021 08:25) (77/41 - 84/48)  BP(mean): 53 (20 May 2021 08:25) (53 - 70)  RR: 56 (20 May 2021 08:25) (44 - 64)  SpO2: 83% (20 May 2021 08:25) (80% - 88%)    General - non-dysmorphic appearance, well-developed, comfortable in room air.  Skin - no cyanosis, no rash.   Eyes / ENT - mucous membranes moist, ears/nose patent.  Pulmonary - Sternal dressing- not saturated,   no wheezes, no rales.  Cardiovascular - normal rate, regular rhythm, normal S1 & S2, grade 3/6 holosystolic murmur at LMSB, no rubs, no gallops, capillary refill < 2sec, normal pulses.  Gastrointestinal - soft, non-distended, non-tender, no hepatomegaly.  Musculoskeletal - no joint swelling, no clubbing, no edema.  Neurologic / Psychiatric - moves all extremities, normal tone.    LABORATORY TESTS:                 137   |  98    |  12                 Ca: 10.8   BMP:   ----------------------------< 91     M.2   (21 @ 02:49)             4.5    |  26    | 0.21               Ph: 7.3      IMAGING STUDIES:  Electrocardiogram - (21) NSR, normal intervals, no ST changes.     Telemetry- (21) NSR, no arrhythmias.     Echocardiogram - (21)   1. Double outlet right ventricle      -remote ventricular septal defect.      -aortic valve anterior and rightward of pulmonary valve.      -no sub-aortic obstruction.      -no sub-pulmonic obstruction.      -conotruncal anatomy: bilateral conus.   2. Status post coarctation repair and placement of MPA band on 5/3/21.   3. Complex interventricular septum with multiple defects as follows:      -Large posterior muscular ventricular septal defect with extension into the inlet septum. This VSD is elongated in the anterior/posterior plane of the interventricular septum and thus the anterior/inferior border of this defect lies close to the apical muscular septum.      -There is a second moderate sized conoventricular, muscular VSD located in the outlet septum which is neither committed to aorta nor pulmonary artery.      -Additionally there are at least 3-4 additional tiny apical and mid-muscular ventricular septal defects.   4. Moderate tricuspid valve regurgitation.   5. Moderate right ventricular hypertrophy and moderately dilated right ventricle.   6. Mild global hypokinesia of the right ventricle.   7. The PA band appears well positioned in the main pulmonary artery with the peak gradient of ~60 mmHg in the setting of systolic BP of 95 mmHg.   8. The left pulmonary artery appears stretched and mildly hypoplastic originating superiorly and medially with axial rotation causing it to lie more horizontally.   9. Normal left ventricular size, morphology and systolic function.  10. No pericardial effusion.     [No Acute Distress] : no acute distress [Well Nourished] : well nourished [Normal Sclera/Conjunctiva] : normal sclera/conjunctiva [EOMI] : extraocular movements intact [Normal Outer Ear/Nose] : the outer ears and nose were normal in appearance [Normal Oropharynx] : the oropharynx was normal [No JVD] : no jugular venous distention [No Lymphadenopathy] : no lymphadenopathy [Supple] : supple [Thyroid Normal, No Nodules] : the thyroid was normal and there were no nodules present [No Respiratory Distress] : no respiratory distress  [No Accessory Muscle Use] : no accessory muscle use [Clear to Auscultation] : lungs were clear to auscultation bilaterally [Normal Rate] : normal rate  [Regular Rhythm] : with a regular rhythm [Normal S1, S2] : normal S1 and S2 [No Murmur] : no murmur heard [Pedal Pulses Present] : the pedal pulses are present [No Edema] : there was no peripheral edema [No Extremity Clubbing/Cyanosis] : no extremity clubbing/cyanosis [Soft] : abdomen soft [Non Tender] : non-tender [Non-distended] : non-distended [Normal Bowel Sounds] : normal bowel sounds [Normal Posterior Cervical Nodes] : no posterior cervical lymphadenopathy [Normal Anterior Cervical Nodes] : no anterior cervical lymphadenopathy [No CVA Tenderness] : no CVA  tenderness [No Spinal Tenderness] : no spinal tenderness [No Joint Swelling] : no joint swelling [Grossly Normal Strength/Tone] : grossly normal strength/tone [No Rash] : no rash [Coordination Grossly Intact] : coordination grossly intact [No Focal Deficits] : no focal deficits [Normal Gait] : normal gait [Normal Affect] : the affect was normal [Normal Insight/Judgement] : insight and judgment were intact

## 2022-01-05 NOTE — PLAN
[FreeTextEntry1] : 66 year old male medically stable for planned procedure .\par Pt has good functional capacity\par \par BPH\par Scheduled for Rezum procedure\par EKG today : NSR @ 95/ incomplete RBBB( old )\par cont Flomax 0.4 mg daily\par \par Htn\par cont HCTZ 25 mg daily- Hold the morning of the procedure \par cont Losartan 100 mg daily\par cont Metoprolol 25 mg daily\par Labs reviewed

## 2022-01-05 NOTE — HISTORY OF PRESENT ILLNESS
[No Pertinent Cardiac History] : no history of aortic stenosis, atrial fibrillation, coronary artery disease, recent myocardial infarction, or implantable device/pacemaker [No Pertinent Pulmonary History] : no history of asthma, COPD, sleep apnea, or smoking [No Adverse Anesthesia Reaction] : no adverse anesthesia reaction in self or family member [Aortic Stenosis] : no aortic stenosis [Atrial Fibrillation] : no atrial fibrillation [Coronary Artery Disease] : no coronary artery disease [Recent Myocardial Infarction] : no recent myocardial infarction [Implantable Device/Pacemaker] : no implantable device/pacemaker [Asthma] : no asthma [COPD] : no COPD [Sleep Apnea] : no sleep apnea [Smoker] : not a smoker [Family Member] : no family member with adverse anesthesia reaction/sudden death [Self] : no previous adverse anesthesia reaction [Chronic Anticoagulation] : no chronic anticoagulation [Chronic Kidney Disease] : no chronic kidney disease [Diabetes] : no diabetes [FreeTextEntry1] : Padmini prostate procedure  [FreeTextEntry2] : 01/25/2022 [FreeTextEntry3] : Dr. Miguel Awad (  fax [phone]) [FreeTextEntry4] : 66 year old male with history of BPH, high risk for diabetes, and HTN/ [place]  presents for medical clearance prior to urological procedure\par \par He feels well, offers no complaints\par Denies CP, SOB, HA, N/V or abdominal pain \par

## 2022-01-11 LAB
ALBUMIN SERPL ELPH-MCNC: 4.5 G/DL
ALP BLD-CCNC: 65 U/L
ALT SERPL-CCNC: 15 U/L
ANION GAP SERPL CALC-SCNC: 14 MMOL/L
APTT BLD: 33.8 SEC
AST SERPL-CCNC: 13 U/L
BASOPHILS # BLD AUTO: 0.04 K/UL
BASOPHILS NFR BLD AUTO: 0.7 %
BILIRUB SERPL-MCNC: 0.7 MG/DL
BUN SERPL-MCNC: 18 MG/DL
CALCIUM SERPL-MCNC: 9.8 MG/DL
CHLORIDE SERPL-SCNC: 100 MMOL/L
CO2 SERPL-SCNC: 27 MMOL/L
CREAT SERPL-MCNC: 0.98 MG/DL
EOSINOPHIL # BLD AUTO: 0.15 K/UL
EOSINOPHIL NFR BLD AUTO: 2.5 %
GLUCOSE SERPL-MCNC: 103 MG/DL
HCT VFR BLD CALC: 41.9 %
HGB BLD-MCNC: 12.7 G/DL
IMM GRANULOCYTES NFR BLD AUTO: 0.3 %
INR PPP: 0.95 RATIO
LYMPHOCYTES # BLD AUTO: 1.37 K/UL
LYMPHOCYTES NFR BLD AUTO: 22.4 %
MAN DIFF?: NORMAL
MCHC RBC-ENTMCNC: 19.6 PG
MCHC RBC-ENTMCNC: 30.3 GM/DL
MCV RBC AUTO: 64.7 FL
MONOCYTES # BLD AUTO: 0.48 K/UL
MONOCYTES NFR BLD AUTO: 7.9 %
NEUTROPHILS # BLD AUTO: 4.05 K/UL
NEUTROPHILS NFR BLD AUTO: 66.2 %
PLATELET # BLD AUTO: 202 K/UL
POTASSIUM SERPL-SCNC: 4.6 MMOL/L
PROT SERPL-MCNC: 7 G/DL
PT BLD: 11.2 SEC
RBC # BLD: 6.48 M/UL
RBC # FLD: 18.9 %
SODIUM SERPL-SCNC: 140 MMOL/L
WBC # FLD AUTO: 6.11 K/UL

## 2022-02-03 ENCOUNTER — INPATIENT (INPATIENT)
Facility: HOSPITAL | Age: 67
LOS: 4 days | Discharge: HOME | DRG: 862 | End: 2022-02-08
Attending: STUDENT IN AN ORGANIZED HEALTH CARE EDUCATION/TRAINING PROGRAM | Admitting: STUDENT IN AN ORGANIZED HEALTH CARE EDUCATION/TRAINING PROGRAM
Payer: MEDICARE

## 2022-02-03 VITALS
DIASTOLIC BLOOD PRESSURE: 55 MMHG | RESPIRATION RATE: 18 BRPM | HEIGHT: 67 IN | OXYGEN SATURATION: 96 % | WEIGHT: 177.91 LBS | SYSTOLIC BLOOD PRESSURE: 92 MMHG | TEMPERATURE: 99 F | HEART RATE: 108 BPM

## 2022-02-03 DIAGNOSIS — G35 MULTIPLE SCLEROSIS: ICD-10-CM

## 2022-02-03 DIAGNOSIS — N40.0 BENIGN PROSTATIC HYPERPLASIA WITHOUT LOWER URINARY TRACT SYMPTOMS: ICD-10-CM

## 2022-02-03 DIAGNOSIS — Z29.9 ENCOUNTER FOR PROPHYLACTIC MEASURES, UNSPECIFIED: ICD-10-CM

## 2022-02-03 DIAGNOSIS — A41.9 SEPSIS, UNSPECIFIED ORGANISM: ICD-10-CM

## 2022-02-03 DIAGNOSIS — I10 ESSENTIAL (PRIMARY) HYPERTENSION: ICD-10-CM

## 2022-02-03 DIAGNOSIS — R65.10 SYSTEMIC INFLAMMATORY RESPONSE SYNDROME (SIRS) OF NON-INFECTIOUS ORIGIN WITHOUT ACUTE ORGAN DYSFUNCTION: ICD-10-CM

## 2022-02-03 DIAGNOSIS — D64.9 ANEMIA, UNSPECIFIED: ICD-10-CM

## 2022-02-03 DIAGNOSIS — N17.9 ACUTE KIDNEY FAILURE, UNSPECIFIED: ICD-10-CM

## 2022-02-03 DIAGNOSIS — N39.0 URINARY TRACT INFECTION, SITE NOT SPECIFIED: ICD-10-CM

## 2022-02-03 LAB
ALBUMIN SERPL ELPH-MCNC: 3.5 G/DL — SIGNIFICANT CHANGE UP (ref 3.3–5)
ALP SERPL-CCNC: 54 U/L — SIGNIFICANT CHANGE UP (ref 40–120)
ALT FLD-CCNC: 76 U/L — HIGH (ref 10–45)
ANION GAP SERPL CALC-SCNC: 15 MMOL/L — SIGNIFICANT CHANGE UP (ref 5–17)
APPEARANCE UR: ABNORMAL
APTT BLD: 31.1 SEC — SIGNIFICANT CHANGE UP (ref 27.5–35.5)
AST SERPL-CCNC: 51 U/L — HIGH (ref 10–40)
BACTERIA # UR AUTO: ABNORMAL
BASE EXCESS BLDV CALC-SCNC: -2.3 MMOL/L — LOW (ref -2–2)
BASOPHILS # BLD AUTO: 0 K/UL — SIGNIFICANT CHANGE UP (ref 0–0.2)
BASOPHILS NFR BLD AUTO: 0 % — SIGNIFICANT CHANGE UP (ref 0–2)
BILIRUB SERPL-MCNC: 0.7 MG/DL — SIGNIFICANT CHANGE UP (ref 0.2–1.2)
BILIRUB UR-MCNC: NEGATIVE — SIGNIFICANT CHANGE UP
BUN SERPL-MCNC: 30 MG/DL — HIGH (ref 7–23)
CA-I SERPL-SCNC: 1.14 MMOL/L — LOW (ref 1.15–1.33)
CALCIUM SERPL-MCNC: 8.4 MG/DL — SIGNIFICANT CHANGE UP (ref 8.4–10.5)
CHLORIDE BLDV-SCNC: 100 MMOL/L — SIGNIFICANT CHANGE UP (ref 96–108)
CHLORIDE SERPL-SCNC: 98 MMOL/L — SIGNIFICANT CHANGE UP (ref 96–108)
CO2 BLDV-SCNC: 25 MMOL/L — SIGNIFICANT CHANGE UP (ref 22–26)
CO2 SERPL-SCNC: 20 MMOL/L — LOW (ref 22–31)
COLOR SPEC: ABNORMAL
CREAT SERPL-MCNC: 1.5 MG/DL — HIGH (ref 0.5–1.3)
DIFF PNL FLD: ABNORMAL
EOSINOPHIL # BLD AUTO: 0 K/UL — SIGNIFICANT CHANGE UP (ref 0–0.5)
EOSINOPHIL NFR BLD AUTO: 0 % — SIGNIFICANT CHANGE UP (ref 0–6)
EPI CELLS # UR: 0 /HPF — SIGNIFICANT CHANGE UP
FLUAV AG NPH QL: SIGNIFICANT CHANGE UP
FLUBV AG NPH QL: SIGNIFICANT CHANGE UP
GAS PNL BLDV: 133 MMOL/L — LOW (ref 136–145)
GAS PNL BLDV: SIGNIFICANT CHANGE UP
GLUCOSE BLDV-MCNC: 94 MG/DL — SIGNIFICANT CHANGE UP (ref 70–99)
GLUCOSE SERPL-MCNC: 99 MG/DL — SIGNIFICANT CHANGE UP (ref 70–99)
GLUCOSE UR QL: NEGATIVE — SIGNIFICANT CHANGE UP
HCO3 BLDV-SCNC: 24 MMOL/L — SIGNIFICANT CHANGE UP (ref 22–29)
HCT VFR BLD CALC: 33.3 % — LOW (ref 39–50)
HCT VFR BLDA CALC: 33 % — LOW (ref 39–51)
HGB BLD CALC-MCNC: 11.1 G/DL — LOW (ref 12.6–17.4)
HGB BLD-MCNC: 10.6 G/DL — LOW (ref 13–17)
HYALINE CASTS # UR AUTO: 1 /LPF — SIGNIFICANT CHANGE UP (ref 0–2)
INR BLD: 1.39 RATIO — HIGH (ref 0.88–1.16)
KETONES UR-MCNC: NEGATIVE — SIGNIFICANT CHANGE UP
LACTATE BLDV-MCNC: 3.6 MMOL/L — HIGH (ref 0.7–2)
LACTATE BLDV-MCNC: 4.6 MMOL/L — CRITICAL HIGH (ref 0.7–2)
LEUKOCYTE ESTERASE UR-ACNC: ABNORMAL
LYMPHOCYTES # BLD AUTO: 0 % — LOW (ref 13–44)
LYMPHOCYTES # BLD AUTO: 0 K/UL — LOW (ref 1–3.3)
MANUAL SMEAR VERIFICATION: SIGNIFICANT CHANGE UP
MCHC RBC-ENTMCNC: 19.9 PG — LOW (ref 27–34)
MCHC RBC-ENTMCNC: 31.8 GM/DL — LOW (ref 32–36)
MCV RBC AUTO: 62.4 FL — LOW (ref 80–100)
METAMYELOCYTES # FLD: 1 % — HIGH (ref 0–0)
MONOCYTES # BLD AUTO: 1.32 K/UL — HIGH (ref 0–0.9)
MONOCYTES NFR BLD AUTO: 7 % — SIGNIFICANT CHANGE UP (ref 2–14)
MYELOCYTES NFR BLD: 3 % — HIGH (ref 0–0)
NEUTROPHILS # BLD AUTO: 16.8 K/UL — HIGH (ref 1.8–7.4)
NEUTROPHILS NFR BLD AUTO: 70 % — SIGNIFICANT CHANGE UP (ref 43–77)
NEUTS BAND # BLD: 19 % — HIGH (ref 0–8)
NITRITE UR-MCNC: POSITIVE
NRBC # BLD: 0 /100 — SIGNIFICANT CHANGE UP (ref 0–0)
PCO2 BLDV: 45 MMHG — SIGNIFICANT CHANGE UP (ref 42–55)
PH BLDV: 7.33 — SIGNIFICANT CHANGE UP (ref 7.32–7.43)
PH UR: 6 — SIGNIFICANT CHANGE UP (ref 5–8)
PLAT MORPH BLD: NORMAL — SIGNIFICANT CHANGE UP
PLATELET # BLD AUTO: 144 K/UL — LOW (ref 150–400)
PO2 BLDV: 22 MMHG — LOW (ref 25–45)
POTASSIUM BLDV-SCNC: 4.4 MMOL/L — SIGNIFICANT CHANGE UP (ref 3.5–5.1)
POTASSIUM SERPL-MCNC: 3.9 MMOL/L — SIGNIFICANT CHANGE UP (ref 3.5–5.3)
POTASSIUM SERPL-SCNC: 3.9 MMOL/L — SIGNIFICANT CHANGE UP (ref 3.5–5.3)
PROT SERPL-MCNC: 5.8 G/DL — LOW (ref 6–8.3)
PROT UR-MCNC: ABNORMAL
PROTHROM AB SERPL-ACNC: 16.4 SEC — HIGH (ref 10.6–13.6)
RBC # BLD: 5.34 M/UL — SIGNIFICANT CHANGE UP (ref 4.2–5.8)
RBC # FLD: 16.2 % — HIGH (ref 10.3–14.5)
RBC BLD AUTO: NORMAL — SIGNIFICANT CHANGE UP
RBC CASTS # UR COMP ASSIST: 162 /HPF — HIGH (ref 0–4)
RSV RNA NPH QL NAA+NON-PROBE: SIGNIFICANT CHANGE UP
SAO2 % BLDV: 29.5 % — LOW (ref 67–88)
SARS-COV-2 RNA SPEC QL NAA+PROBE: SIGNIFICANT CHANGE UP
SODIUM SERPL-SCNC: 133 MMOL/L — LOW (ref 135–145)
SP GR SPEC: 1.01 — SIGNIFICANT CHANGE UP (ref 1.01–1.02)
UROBILINOGEN FLD QL: NEGATIVE — SIGNIFICANT CHANGE UP
WBC # BLD: 18.88 K/UL — HIGH (ref 3.8–10.5)
WBC # FLD AUTO: 18.88 K/UL — HIGH (ref 3.8–10.5)
WBC UR QL: 194 /HPF — HIGH (ref 0–5)

## 2022-02-03 PROCEDURE — 99285 EMERGENCY DEPT VISIT HI MDM: CPT | Mod: 25

## 2022-02-03 PROCEDURE — 93010 ELECTROCARDIOGRAM REPORT: CPT

## 2022-02-03 PROCEDURE — 99223 1ST HOSP IP/OBS HIGH 75: CPT | Mod: GC

## 2022-02-03 PROCEDURE — 74176 CT ABD & PELVIS W/O CONTRAST: CPT | Mod: 26

## 2022-02-03 RX ORDER — CEFTRIAXONE 500 MG/1
1000 INJECTION, POWDER, FOR SOLUTION INTRAMUSCULAR; INTRAVENOUS ONCE
Refills: 0 | Status: COMPLETED | OUTPATIENT
Start: 2022-02-03 | End: 2022-02-03

## 2022-02-03 RX ORDER — HEPARIN SODIUM 5000 [USP'U]/ML
5000 INJECTION INTRAVENOUS; SUBCUTANEOUS EVERY 8 HOURS
Refills: 0 | Status: DISCONTINUED | OUTPATIENT
Start: 2022-02-03 | End: 2022-02-06

## 2022-02-03 RX ORDER — SODIUM CHLORIDE 9 MG/ML
1000 INJECTION, SOLUTION INTRAVENOUS
Refills: 0 | Status: DISCONTINUED | OUTPATIENT
Start: 2022-02-03 | End: 2022-02-03

## 2022-02-03 RX ORDER — SODIUM CHLORIDE 9 MG/ML
500 INJECTION INTRAMUSCULAR; INTRAVENOUS; SUBCUTANEOUS ONCE
Refills: 0 | Status: COMPLETED | OUTPATIENT
Start: 2022-02-03 | End: 2022-02-03

## 2022-02-03 RX ORDER — SODIUM CHLORIDE 9 MG/ML
1000 INJECTION, SOLUTION INTRAVENOUS ONCE
Refills: 0 | Status: COMPLETED | OUTPATIENT
Start: 2022-02-03 | End: 2022-02-03

## 2022-02-03 RX ORDER — PIPERACILLIN AND TAZOBACTAM 4; .5 G/20ML; G/20ML
3.38 INJECTION, POWDER, LYOPHILIZED, FOR SOLUTION INTRAVENOUS ONCE
Refills: 0 | Status: COMPLETED | OUTPATIENT
Start: 2022-02-03 | End: 2022-02-03

## 2022-02-03 RX ORDER — ACETAMINOPHEN 500 MG
975 TABLET ORAL ONCE
Refills: 0 | Status: COMPLETED | OUTPATIENT
Start: 2022-02-03 | End: 2022-02-03

## 2022-02-03 RX ORDER — PIPERACILLIN AND TAZOBACTAM 4; .5 G/20ML; G/20ML
3.38 INJECTION, POWDER, LYOPHILIZED, FOR SOLUTION INTRAVENOUS EVERY 8 HOURS
Refills: 0 | Status: DISCONTINUED | OUTPATIENT
Start: 2022-02-03 | End: 2022-02-06

## 2022-02-03 RX ORDER — VANCOMYCIN HCL 1 G
1000 VIAL (EA) INTRAVENOUS EVERY 24 HOURS
Refills: 0 | Status: DISCONTINUED | OUTPATIENT
Start: 2022-02-03 | End: 2022-02-04

## 2022-02-03 RX ORDER — TAMSULOSIN HYDROCHLORIDE 0.4 MG/1
0.4 CAPSULE ORAL AT BEDTIME
Refills: 0 | Status: DISCONTINUED | OUTPATIENT
Start: 2022-02-03 | End: 2022-02-07

## 2022-02-03 RX ORDER — SODIUM CHLORIDE 9 MG/ML
1000 INJECTION, SOLUTION INTRAVENOUS
Refills: 0 | Status: DISCONTINUED | OUTPATIENT
Start: 2022-02-03 | End: 2022-02-04

## 2022-02-03 RX ADMIN — SODIUM CHLORIDE 500 MILLILITER(S): 9 INJECTION INTRAMUSCULAR; INTRAVENOUS; SUBCUTANEOUS at 10:37

## 2022-02-03 RX ADMIN — HEPARIN SODIUM 5000 UNIT(S): 5000 INJECTION INTRAVENOUS; SUBCUTANEOUS at 22:08

## 2022-02-03 RX ADMIN — SODIUM CHLORIDE 1000 MILLILITER(S): 9 INJECTION, SOLUTION INTRAVENOUS at 15:30

## 2022-02-03 RX ADMIN — SODIUM CHLORIDE 100 MILLILITER(S): 9 INJECTION, SOLUTION INTRAVENOUS at 18:48

## 2022-02-03 RX ADMIN — PIPERACILLIN AND TAZOBACTAM 200 GRAM(S): 4; .5 INJECTION, POWDER, LYOPHILIZED, FOR SOLUTION INTRAVENOUS at 18:00

## 2022-02-03 RX ADMIN — CEFTRIAXONE 100 MILLIGRAM(S): 500 INJECTION, POWDER, FOR SOLUTION INTRAMUSCULAR; INTRAVENOUS at 10:37

## 2022-02-03 RX ADMIN — TAMSULOSIN HYDROCHLORIDE 0.4 MILLIGRAM(S): 0.4 CAPSULE ORAL at 22:08

## 2022-02-03 RX ADMIN — Medication 975 MILLIGRAM(S): at 13:12

## 2022-02-03 RX ADMIN — SODIUM CHLORIDE 500 MILLILITER(S): 9 INJECTION INTRAMUSCULAR; INTRAVENOUS; SUBCUTANEOUS at 11:10

## 2022-02-03 RX ADMIN — Medication 250 MILLIGRAM(S): at 16:35

## 2022-02-03 RX ADMIN — PIPERACILLIN AND TAZOBACTAM 25 GRAM(S): 4; .5 INJECTION, POWDER, LYOPHILIZED, FOR SOLUTION INTRAVENOUS at 22:08

## 2022-02-03 RX ADMIN — SODIUM CHLORIDE 500 MILLILITER(S): 9 INJECTION INTRAMUSCULAR; INTRAVENOUS; SUBCUTANEOUS at 13:36

## 2022-02-03 NOTE — ED PROVIDER NOTE - OBJECTIVE STATEMENT
66M PMH HTN, HLD, MS, BPH p/w fevers/chills/hypotension. Pt had Rezum procedure performed 10 days ago by Dr. Miguel Awad and had louise in place. About 5 days ago, he had L flank pain. Louise was removed 2 days ago and pt has had hematuria and dysuria since then. Fevers/chills began yest. Took tylenol about 2 hrs ago. No chest pain, SOB, cough, sore throat, rhinorrhea, abdominal pain, n/v, rectal pain, leg swelling, rash    Per ems, pt was at [facility] and found to be hypotensive sBP 80's, now received about 1L of IVF w/ improved BP

## 2022-02-03 NOTE — ED PROVIDER NOTE - PROGRESS NOTE DETAILS
Eloy HILLS (PGY-2)  spoke with dr. reagan who recommended bladder scan. Pt's PVR 180cc. will place coudet  Also spoke with uro resident who will come see pt in ED Eloy HILLS (PGY-2)  uro recs: would like pt admitted to medicine and they will follow along. ok w/ rocephin. Palencia placed and about 150cc urine came out

## 2022-02-03 NOTE — H&P ADULT - PROBLEM SELECTOR PLAN 1
Patient meets SIRS criteria on presentation w/ HR>90, WBC>12, T>38 at home  Also noted hypotension to 90s/50s, elevated lactate  UA w/ hematuria, pyuria, large LE, positive nitrite  Likely i/s/o urinary tract infection given recent surgery & Palencia  s/p 3x bolus 500cc NS  - Blood cultures sent, f/u results  - Urine cultures sent, f/u results  - 1L LR now  - Start empiric Zosyn (2/3-)  - Trend lactate  - Monitor WBC, trend fever curve, vitals  - Management of UTI as per below Patient meets criteria for severe sepsis w/ HR>90, WBC>12, T>38 at home, hypotension, elevated lactate, bandemia  UA w/ hematuria, pyuria, large LE, positive nitrite  Likely i/s/o urinary tract infection given recent surgery & Palencia  s/p 3x bolus 500cc NS  - Blood cultures sent, f/u results  - Urine cultures sent, f/u results  - 1L LR now  - Start empiric Zosyn (2/3-)  - Start empiric vancomycin (2/3-)  - Trend lactate  - CTAP w/o contrast to r/o abscess given recent instrumentation  - Monitor WBC, trend fever curve, vitals  - Management of UTI as per below  - If patient's blood pressure not responding to IVF, MICU consult

## 2022-02-03 NOTE — ED PROVIDER NOTE - NS ED ROS FT
CONST: +fevers, +chills  ENT: no sore throat  CV: no chest pain, no leg swelling  RESP: no shortness of breath, no cough  ABD: no abdominal pain, no nausea, no vomiting, no diarrhea  : +dysuria, +flank pain, +hematuria  MSK: no back pain, no extremity pain  SKIN:  no rash

## 2022-02-03 NOTE — CONSULT NOTE ADULT - SUBJECTIVE AND OBJECTIVE BOX
HPI:  Patient is a 66y Male who presented with    PAST MEDICAL & SURGICAL HISTORY:  HTN (hypertension)    HLD (hyperlipidemia)    BPH (benign prostatic hyperplasia)    No significant past surgical history      FAMILY HISTORY:    SOCIAL HISTORY:   Tobacco hx:   MEDICATIONS  (STANDING):  acetaminophen     Tablet .. 975 milliGRAM(s) Oral once    MEDICATIONS  (PRN):    Allergies    No Known Allergies    Intolerances        REVIEW OF SYSTEMS: Pertinent positives and negatives as stated in HPI, otherwise negative    Vital signs  T(C): 37.9 (22 @ 10:14), Max: 37.9 (22 @ 10:14)  HR: 98 (22 @ 12:20)  BP: 92/68 (22 @ 12:20)  SpO2: 99% (22 @ 12:20)  Wt(kg): --    Physical Exam  Gen: NAD  HEENT: normocephalic, atraumatic, no scleral icterus  Pulm: CTA b/l, No respiratory distress, no subcostal retractions  CV: RRR, no murmur, no JVD  Abd: Soft, NT, ND, no rebound tenderness or guarding  : Uncircumcised/Circumcised, no lesions.  No discharge or blood at urethral meatus.  Testes descended bilaterally.  Testes and epididymis nontender bilaterally.  Cremasteric reflex present bilaterally.  Back: No CVAT   MSK:  Moving all extremities, full ROM in all extremities, No edema present  NEURO: A&Ox3, no focal neurological deficits, CN 2-12 grossly intact  SKIN: warm, dry, no rash.    LABS:         @ 10:20    WBC 18.88 / Hct 33.3  / SCr 1.50         133<L>  |  98  |  30<H>  ----------------------------<  99  3.9   |  20<L>  |  1.50<H>    Ca    8.4      2022 10:20    TPro  5.8<L>  /  Alb  3.5  /  TBili  0.7  /  DBili  x   /  AST  51<H>  /  ALT  76<H>  /  AlkPhos  54      PT/INR - ( 2022 10:20 )   PT: 16.4 sec;   INR: 1.39 ratio         PTT - ( 2022 10:20 )  PTT:31.1 sec  Urinalysis Basic - ( 2022 11:12 )    Color: Light Orange / Appearance: Slightly Turbid / S.014 / pH: x  Gluc: x / Ketone: Negative  / Bili: Negative / Urobili: Negative   Blood: x / Protein: 30 mg/dL / Nitrite: Positive   Leuk Esterase: Large / RBC: 162 /hpf /  /HPF   Sq Epi: x / Non Sq Epi: 0 /hpf / Bacteria: Moderate        Urine Cx:   Blood Cx:    Radiology:   HPI:  Patient is a 66y Male who presented with fever, hypotension, rigors x 1 day. pt s/p rezume prostate procedure ~10 days ago.  Had louise removed 3 days ago states last night he had temp 104F and had a low BP but decided to wait till this am to come to ED.  currently patient without any complaints.  no flank pain, no nausea, no vomiting.  States hes having some difficulty voiding. PVR in ED ~200cc    PAST MEDICAL & SURGICAL HISTORY:  HTN (hypertension)    HLD (hyperlipidemia)    BPH (benign prostatic hyperplasia)    No significant past surgical history      FAMILY HISTORY:    SOCIAL HISTORY:   Tobacco hx:   MEDICATIONS  (STANDING):  acetaminophen     Tablet .. 975 milliGRAM(s) Oral once    MEDICATIONS  (PRN):    Allergies    No Known Allergies    Intolerances        REVIEW OF SYSTEMS: Pertinent positives and negatives as stated in HPI, otherwise negative    Vital signs  T(C): 37.9 (22 @ 10:14), Max: 37.9 (22 @ 10:14)  HR: 98 (22 @ 12:20)  BP: 92/68 (22 @ 12:20)  SpO2: 99% (22 @ 12:20)  Wt(kg): --    Physical Exam  Gen: NAD  HEENT: normocephalic, atraumatic, no scleral icterus  Pulm: CTA b/l, No respiratory distress, no subcostal retractions  CV: RRR, no murmur, no JVD  Abd: Soft, NT, ND, no rebound tenderness or guarding  : No discharge or blood at urethral meatus.  Testes descended bilaterally.  Testes and epididymis nontender bilaterally.   Back: No CVAT   MSK:  Moving all extremities, full ROM in all extremities, No edema present  NEURO: A&Ox3, no focal neurological deficits,   SKIN: warm, dry, no rash.    LABS:         @ 10:20    WBC 18.88 / Hct 33.3  / SCr 1.50         133<L>  |  98  |  30<H>  ----------------------------<  99  3.9   |  20<L>  |  1.50<H>    Ca    8.4      2022 10:20    TPro  5.8<L>  /  Alb  3.5  /  TBili  0.7  /  DBili  x   /  AST  51<H>  /  ALT  76<H>  /  AlkPhos  54  02-03    PT/INR - ( 2022 10:20 )   PT: 16.4 sec;   INR: 1.39 ratio         PTT - ( 2022 10:20 )  PTT:31.1 sec  Urinalysis Basic - ( 2022 11:12 )    Color: Light Orange / Appearance: Slightly Turbid / S.014 / pH: x  Gluc: x / Ketone: Negative  / Bili: Negative / Urobili: Negative   Blood: x / Protein: 30 mg/dL / Nitrite: Positive   Leuk Esterase: Large / RBC: 162 /hpf /  /HPF   Sq Epi: x / Non Sq Epi: 0 /hpf / Bacteria: Moderate        Urine Cx:   Blood Cx:    Radiology:

## 2022-02-03 NOTE — H&P ADULT - PROBLEM SELECTOR PLAN 7
Patient with anemia to 10.6 on admission, MCV 62.4  Reports prior history of thalassemia   - Monitor CBC daily  - Transfuse hgb <7

## 2022-02-03 NOTE — H&P ADULT - HISTORY OF PRESENT ILLNESS
Mr. Mulligan is a 65 yo M with a history of multiple sclerosis (not on meds), pre-DM, HTN, BPH who presents 9 days following Rezum prostate procedure with fevers, chills, hypotension. He has a history of BPH that has previously been managed with tamsulosin. He underwent Rezum procedure with Dr. Awad on 1/25 and was d/c'ed home without complications. He had Palencia removed 3 days ago and reports fevers to 104F yesterday and low blood pressure.     In the ED, he was afebrile, hypotensive to 92/55, tachycardic to 108, O2 sat % on RA. WBC was 19 w/ 19% bands, hgb 10.6, platelets 144. Na 133, BUN/Cr 30/1.50. Lactate 4.6-->3.6. UA w/ 162 RBC, 194 WBC, moderate bacteria, large LE, positive nitrite. EKG showed sinus tachycardia. He was given Tylenol, 3x 500cc bolus NS with improvement of BP to 92/68. He was admitted to medicine for further management.  Mr. Mulligan is a 67 yo M with a history of multiple sclerosis (not on meds), pre-DM, HTN, BPH who presents 9 days following Rezum prostate procedure with fevers, chills, hypotension. He has a history of BPH that has previously been managed with tamsulosin. He underwent Rezum procedure with Dr. Awad on 1/25 and was d/c'ed home without complications. He had Palencia removed 3 days ago and reports fevers to 104F yesterday with rigors and low blood pressure. In addition, he reports significant weakness, dysuria & dull abdominal pain. He denies syncope, cough, shortness of breath, chest pain, N/V/D, flank pain, urinary retention, skin changes, lower extremity swelling.     In the ED, he was afebrile, hypotensive to 92/55, tachycardic to 108, O2 sat % on RA. WBC was 19 w/ 19% bands, hgb 10.6, platelets 144. Na 133, BUN/Cr 30/1.50. Lactate 4.6-->3.6. UA w/ 162 RBC, 194 WBC, moderate bacteria, large LE, positive nitrite. EKG showed sinus tachycardia. Post-void residual showed 200cc urine. Urology was called for placement of Palencia. He was given Tylenol, 3x 500cc bolus NS with improvement of BP to 92/68. He was admitted to medicine for further management.  Mr. Mulligan is a 65 yo M with a history of multiple sclerosis (not on meds), pre-DM, HTN, BPH who presents 9 days following Rezum prostate procedure with fevers, chills, hypotension. He has a history of BPH that has previously been managed with tamsulosin. He underwent Rezum procedure with Dr. Awad on 1/25 and was d/c'ed home without complications. He had Palencia removed 3 days ago and reports fevers to 104F yesterday with rigors and low blood pressure. In addition, he reports significant weakness, dysuria & dull abdominal pain. He denies syncope, cough, shortness of breath, chest pain, N/V/D, flank pain, urinary retention, skin changes, lower extremity swelling.     In the ED, he was afebrile, hypotensive to 92/55, tachycardic to 108, O2 sat % on RA. WBC was 19 w/ 19% bands, hgb 10.6, platelets 144. Na 133, BUN/Cr 30/1.50. AST/ALT 51/76. Lactate 4.6-->3.6. UA w/ 162 RBC, 194 WBC, moderate bacteria, large LE, positive nitrite. EKG showed sinus tachycardia. Post-void residual showed 200cc urine. Urology was called for placement of Palencia. He was given Tylenol, CTX x1, 3x 500cc bolus NS with improvement of BP to 92/68. He was admitted to medicine for further management.

## 2022-02-03 NOTE — H&P ADULT - PROBLEM SELECTOR PLAN 2
Patient w/ recent Rezum prostate procedure, Palencia removed 3 days ago  Presented w/ fevers, grossly positive UA, dysuria  No evidence of urinary retention on admission, Palencia placed by urology  - F/u cultures  - Start empiric Zosyn (2/3-)  - Palencia in place  - Trend WBC, fever curve, vitals Patient w/ recent Rezum prostate procedure, Palencia removed 3 days ago  Presented w/ fevers, grossly positive UA, dysuria  No evidence of urinary retention on admission, Palencia placed by urology  - F/u cultures  - Start empiric Zosyn (2/3-)  - Start empiric vancomycin (2/3-)  - CTAP w/o contrast as above  - Palencia in place  - Trend WBC, fever curve, vitals

## 2022-02-03 NOTE — H&P ADULT - PROBLEM SELECTOR PLAN 5
H/o BPH s/p Rezum procedure 9 days ago  - Palencia in place  - Strict Is/Os  - Continue home tamsulosin  - Urology on board, appreciate recs

## 2022-02-03 NOTE — H&P ADULT - NSHPREVIEWOFSYSTEMS_GEN_ALL_CORE
REVIEW OF SYSTEMS:    CONSTITUTIONAL: No weakness, fevers or chills  EYES/ENT: No visual changes;  No vertigo or throat pain   NECK: No pain or stiffness  RESPIRATORY: No cough, wheezing, hemoptysis; No shortness of breath  CARDIOVASCULAR: No chest pain or palpitations  GASTROINTESTINAL: No abdominal or epigastric pain. No nausea, vomiting, or hematemesis; No diarrhea or constipation. No melena or hematochezia.  GENITOURINARY: No dysuria, frequency or hematuria  NEUROLOGICAL: No numbness or weakness  SKIN: No itching, burning, rashes, or lesions   HEME: no easy bruising or unexplained bleeding  ENDO: no heat intolerance, no cold intolerance  PSYCH: no SI, or depression  All other review of systems is negative unless indicated above. REVIEW OF SYSTEMS:    CONSTITUTIONAL: +weakness, fevers, chills  RESPIRATORY: No cough, wheezing, hemoptysis; No shortness of breath  CARDIOVASCULAR: No chest pain or palpitations  GASTROINTESTINAL: + mild abdominal. No nausea, vomiting, or hematemesis; No diarrhea or constipation. No melena or hematochezia.  GENITOURINARY: +dysuria, frequency, No hematuria  NEUROLOGICAL: No numbness or weakness  All other review of systems is negative unless indicated above.

## 2022-02-03 NOTE — H&P ADULT - NSHPPHYSICALEXAM_GEN_ALL_CORE
VITALS:   T(C): 37.9 (02-03-22 @ 10:14), Max: 37.9 (02-03-22 @ 10:14)  HR: 105 (02-03-22 @ 13:11) (98 - 108)  BP: 90/60 (02-03-22 @ 13:11) (87/61 - 92/68)  RR: 18 (02-03-22 @ 13:11) (17 - 18)  SpO2: 99% (02-03-22 @ 13:11) (96% - 100%)    GENERAL: NAD, lying in bed comfortably  HEAD:  Atraumatic, Normocephalic  EYES: EOMI, PERRLA, conjunctiva and sclera clear  ENT: Moist mucous membranes  NECK: Supple, No JVD  CHEST/LUNG: Clear to auscultation bilaterally; No rales, rhonchi, wheezing, or rubs. Unlabored respirations  HEART: Regular rate and rhythm; No murmurs, rubs, or gallops  ABDOMEN: BSx4; Soft, nontender, nondistended  EXTREMITIES:  2+ Peripheral Pulses, brisk capillary refill. No clubbing, cyanosis, or edema  NERVOUS SYSTEM:  A&Ox3, no focal deficits   SKIN: No rashes or lesions  Psych: Normal speech, normal behavior, normal affect

## 2022-02-03 NOTE — ED ADULT NURSE REASSESSMENT NOTE - NS ED NURSE REASSESS COMMENT FT1
Palencia catheter using coude cath 20G inserted using sterile technique, draining by gravity, secured with StatLock. Second RN present to confirm sterility. 100cc drained at this time

## 2022-02-03 NOTE — H&P ADULT - PROBLEM SELECTOR PLAN 4
On home HCTZ 25mg, losartan 100mg, metoprolol succinate 25mg  - Hold antihypertensives i/s/o hypotension

## 2022-02-03 NOTE — ED ADULT NURSE NOTE - OBJECTIVE STATEMENT
65 y/o male form home coming to ED for fever/chills s/p prostate procedure 1/28/2022.  PMH BPH, HTN, HLD.  Pt states fever started 2 days ago with chills worsening today.  Pt denies pain during urination at tis time, procedure insertion site around meatus is intact, no redness or swelling noted, no issues urinating at this time.  Pt denies CP, SOB, abd pain, states nausea with no vomiting or diarrhea.  Pt is A&Ox4, equal unlabored breathing, skin warm dry and intact

## 2022-02-03 NOTE — ED PROVIDER NOTE - PHYSICAL EXAMINATION
Physical Exam:  Gen: awake alert   HEENT: normal conjunctiva, oral mucosa dry  Lung: CTAB, no respiratory distress, no wheezes/rhonchi/rales B/L, speaking in full sentences  CV: Regular, tachycardic  Abd: soft, NT, ND, no guarding, no rigidity, no rebound tenderness, no CVA tenderness   Rectal: enlarged but nontender prostate to palpation  MSK: no visible deformities  Skin: Warm, well perfused, no rash, no leg swelling  ~Reyes Lyons MD (PGY-2)

## 2022-02-03 NOTE — H&P ADULT - ASSESSMENT
Mr. Mulligan is a 67 yo M with a history of multiple sclerosis (not on meds), pre-DM, HTN, BPH who presents 9 days following Rezum prostate procedure with fevers, chills, hypotension, likely i/s/o sepsis 2/2 urinary tract infection. Mr. Mulligan is a 67 yo M with a history of multiple sclerosis (not on meds), pre-DM, HTN, BPH who presents 9 days following Rezum prostate procedure with fevers, chills, hypotension, likely i/s/o severe sepsis 2/2 urinary tract infection.

## 2022-02-03 NOTE — ED PROVIDER NOTE - ATTENDING CONTRIBUTION TO CARE
I performed a history and physical exam of the patient and discussed their management with the resident and /or advanced care provider. I reviewed the resident and /or ACP's note and agree with the documented findings and plan of care. My medical decision making and observations are found above.  Lungs clear abd soft.

## 2022-02-03 NOTE — H&P ADULT - PROBLEM SELECTOR PLAN 3
Noted elevated Cr to 1.50 on admission  Likely pre-renal i/s/o hypotension  No evidence of urinary retention on bladder scan  - Urology on board, appreciate recs  - IVF as above  - Send urine creatinine, sodium  - Maintain Palencia for now  - Strict Is/Os  - Monitor BMP qd

## 2022-02-03 NOTE — H&P ADULT - ATTENDING COMMENTS
1. Severe sepsis due to UTI  2. s/p Rezum procedure recently  3. HTN  4. MS    - Febrile, WBC 18 with bandemia, positive UA. Lactate 4.6 in ED, borderline BP    IV fluid challenge, pancultures    IV Zosyn/Vancomycin    CT abd/pelvis no contrast    BP q 4hrs  - Appreciated Urology consult and plan   - Hold all meds for BP/tachyarrythmia  - If BP remains <90 even after IV hydration, will call MICU consult  - DVT ppx

## 2022-02-03 NOTE — PATIENT PROFILE ADULT - FALL HARM RISK - HARM RISK INTERVENTIONS

## 2022-02-03 NOTE — H&P ADULT - NSHPSOCIALHISTORY_GEN_ALL_CORE
Patient lives with his wife Lisa & daughter in [place]. He also has a son who is local. He used to work as an  but has since retired. He is independent in his ADLs/IADLs. He Patient lives with his wife Lisa & daughter in [place]. He also has a son who is local. He used to work as an  but has since retired. He is independent in his ADLs/IADLs. He has a remote history of cigarette smoking, reports smoking 3 cigars per year, social alcohol, no illicit drug use.

## 2022-02-04 ENCOUNTER — TRANSCRIPTION ENCOUNTER (OUTPATIENT)
Age: 67
End: 2022-02-04

## 2022-02-04 DIAGNOSIS — A41.9 SEPSIS, UNSPECIFIED ORGANISM: ICD-10-CM

## 2022-02-04 LAB
ALBUMIN SERPL ELPH-MCNC: 3.3 G/DL — SIGNIFICANT CHANGE UP (ref 3.3–5)
ALP SERPL-CCNC: 61 U/L — SIGNIFICANT CHANGE UP (ref 40–120)
ALT FLD-CCNC: 49 U/L — HIGH (ref 10–45)
ANION GAP SERPL CALC-SCNC: 13 MMOL/L — SIGNIFICANT CHANGE UP (ref 5–17)
AST SERPL-CCNC: 32 U/L — SIGNIFICANT CHANGE UP (ref 10–40)
BASOPHILS # BLD AUTO: 0 K/UL — SIGNIFICANT CHANGE UP (ref 0–0.2)
BASOPHILS NFR BLD AUTO: 0 % — SIGNIFICANT CHANGE UP (ref 0–2)
BILIRUB SERPL-MCNC: 0.5 MG/DL — SIGNIFICANT CHANGE UP (ref 0.2–1.2)
BLD GP AB SCN SERPL QL: NEGATIVE — SIGNIFICANT CHANGE UP
BUN SERPL-MCNC: 23 MG/DL — SIGNIFICANT CHANGE UP (ref 7–23)
CALCIUM SERPL-MCNC: 8.9 MG/DL — SIGNIFICANT CHANGE UP (ref 8.4–10.5)
CHLORIDE SERPL-SCNC: 106 MMOL/L — SIGNIFICANT CHANGE UP (ref 96–108)
CO2 SERPL-SCNC: 22 MMOL/L — SIGNIFICANT CHANGE UP (ref 22–31)
CREAT SERPL-MCNC: 0.98 MG/DL — SIGNIFICANT CHANGE UP (ref 0.5–1.3)
E COLI DNA BLD POS QL NAA+NON-PROBE: SIGNIFICANT CHANGE UP
EOSINOPHIL # BLD AUTO: 0 K/UL — SIGNIFICANT CHANGE UP (ref 0–0.5)
EOSINOPHIL NFR BLD AUTO: 0 % — SIGNIFICANT CHANGE UP (ref 0–6)
FERRITIN SERPL-MCNC: 699 NG/ML — HIGH (ref 30–400)
GLUCOSE SERPL-MCNC: 96 MG/DL — SIGNIFICANT CHANGE UP (ref 70–99)
GRAM STN FLD: SIGNIFICANT CHANGE UP
HCT VFR BLD CALC: 34.4 % — LOW (ref 39–50)
HCV AB S/CO SERPL IA: 0.11 S/CO — SIGNIFICANT CHANGE UP (ref 0–0.99)
HCV AB SERPL-IMP: SIGNIFICANT CHANGE UP
HGB BLD-MCNC: 10.7 G/DL — LOW (ref 13–17)
IRON SATN MFR SERPL: 10 UG/DL — LOW (ref 45–165)
IRON SATN MFR SERPL: 5 % — LOW (ref 16–55)
LACTATE SERPL-SCNC: 1.6 MMOL/L — SIGNIFICANT CHANGE UP (ref 0.7–2)
LYMPHOCYTES # BLD AUTO: 0.55 K/UL — LOW (ref 1–3.3)
LYMPHOCYTES # BLD AUTO: 3.5 % — LOW (ref 13–44)
MAGNESIUM SERPL-MCNC: 1.7 MG/DL — SIGNIFICANT CHANGE UP (ref 1.6–2.6)
MANUAL SMEAR VERIFICATION: SIGNIFICANT CHANGE UP
MCHC RBC-ENTMCNC: 19.6 PG — LOW (ref 27–34)
MCHC RBC-ENTMCNC: 31.1 GM/DL — LOW (ref 32–36)
MCV RBC AUTO: 63.1 FL — LOW (ref 80–100)
METHOD TYPE: SIGNIFICANT CHANGE UP
MONOCYTES # BLD AUTO: 0.27 K/UL — SIGNIFICANT CHANGE UP (ref 0–0.9)
MONOCYTES NFR BLD AUTO: 1.7 % — LOW (ref 2–14)
NEUTROPHILS # BLD AUTO: 15.03 K/UL — HIGH (ref 1.8–7.4)
NEUTROPHILS NFR BLD AUTO: 81.7 % — HIGH (ref 43–77)
NEUTS BAND # BLD: 13.1 % — HIGH (ref 0–8)
PHOSPHATE SERPL-MCNC: 1.5 MG/DL — LOW (ref 2.5–4.5)
PLAT MORPH BLD: NORMAL — SIGNIFICANT CHANGE UP
PLATELET # BLD AUTO: 128 K/UL — LOW (ref 150–400)
POTASSIUM SERPL-MCNC: 3.5 MMOL/L — SIGNIFICANT CHANGE UP (ref 3.5–5.3)
POTASSIUM SERPL-SCNC: 3.5 MMOL/L — SIGNIFICANT CHANGE UP (ref 3.5–5.3)
PROT SERPL-MCNC: 5.9 G/DL — LOW (ref 6–8.3)
RBC # BLD: 5.45 M/UL — SIGNIFICANT CHANGE UP (ref 4.2–5.8)
RBC # FLD: 16.7 % — HIGH (ref 10.3–14.5)
RBC BLD AUTO: SIGNIFICANT CHANGE UP
RH IG SCN BLD-IMP: POSITIVE — SIGNIFICANT CHANGE UP
SODIUM SERPL-SCNC: 141 MMOL/L — SIGNIFICANT CHANGE UP (ref 135–145)
SPECIMEN SOURCE: SIGNIFICANT CHANGE UP
TIBC SERPL-MCNC: 176 UG/DL — LOW (ref 220–430)
TRANSFERRIN SERPL-MCNC: 144 MG/DL — LOW (ref 200–360)
UIBC SERPL-MCNC: 166 UG/DL — SIGNIFICANT CHANGE UP (ref 110–370)
WBC # BLD: 15.85 K/UL — HIGH (ref 3.8–10.5)
WBC # FLD AUTO: 15.85 K/UL — HIGH (ref 3.8–10.5)

## 2022-02-04 PROCEDURE — 99233 SBSQ HOSP IP/OBS HIGH 50: CPT | Mod: GC

## 2022-02-04 RX ORDER — ACETAMINOPHEN 500 MG
650 TABLET ORAL ONCE
Refills: 0 | Status: COMPLETED | OUTPATIENT
Start: 2022-02-04 | End: 2022-02-04

## 2022-02-04 RX ORDER — POTASSIUM PHOSPHATE, MONOBASIC POTASSIUM PHOSPHATE, DIBASIC 236; 224 MG/ML; MG/ML
30 INJECTION, SOLUTION INTRAVENOUS ONCE
Refills: 0 | Status: COMPLETED | OUTPATIENT
Start: 2022-02-04 | End: 2022-02-04

## 2022-02-04 RX ADMIN — HEPARIN SODIUM 5000 UNIT(S): 5000 INJECTION INTRAVENOUS; SUBCUTANEOUS at 22:04

## 2022-02-04 RX ADMIN — TAMSULOSIN HYDROCHLORIDE 0.4 MILLIGRAM(S): 0.4 CAPSULE ORAL at 22:04

## 2022-02-04 RX ADMIN — PIPERACILLIN AND TAZOBACTAM 25 GRAM(S): 4; .5 INJECTION, POWDER, LYOPHILIZED, FOR SOLUTION INTRAVENOUS at 06:07

## 2022-02-04 RX ADMIN — HEPARIN SODIUM 5000 UNIT(S): 5000 INJECTION INTRAVENOUS; SUBCUTANEOUS at 06:07

## 2022-02-04 RX ADMIN — SODIUM CHLORIDE 100 MILLILITER(S): 9 INJECTION, SOLUTION INTRAVENOUS at 06:40

## 2022-02-04 RX ADMIN — PIPERACILLIN AND TAZOBACTAM 25 GRAM(S): 4; .5 INJECTION, POWDER, LYOPHILIZED, FOR SOLUTION INTRAVENOUS at 15:50

## 2022-02-04 RX ADMIN — PIPERACILLIN AND TAZOBACTAM 25 GRAM(S): 4; .5 INJECTION, POWDER, LYOPHILIZED, FOR SOLUTION INTRAVENOUS at 22:04

## 2022-02-04 RX ADMIN — HEPARIN SODIUM 5000 UNIT(S): 5000 INJECTION INTRAVENOUS; SUBCUTANEOUS at 13:54

## 2022-02-04 RX ADMIN — Medication 650 MILLIGRAM(S): at 06:06

## 2022-02-04 RX ADMIN — Medication 650 MILLIGRAM(S): at 06:38

## 2022-02-04 RX ADMIN — POTASSIUM PHOSPHATE, MONOBASIC POTASSIUM PHOSPHATE, DIBASIC 83.33 MILLIMOLE(S): 236; 224 INJECTION, SOLUTION INTRAVENOUS at 11:13

## 2022-02-04 NOTE — PROGRESS NOTE ADULT - ATTENDING COMMENTS
1. E. Coli septicemia  2. UTI and Prostatitis  3. s/p Rezum procedure recently  4. HTN  5. MS    - Afebrile, WBC down to 15, has bandemia, blood c/s grew E. Coli likely from recent urologic procedure. positive UA. BP improved    c/w IV Zosyn, d/c'd IV Vancomycin    will f/u sensitivity  - Reviewed CT abd/pelvis no contrast- no abscess but evidence of cystitis and prostatitis   - Urology f/u plan noted, unclear why he needs Palencia- no urinary retention    - Hold all meds for borderline BP  - DVT ppx . 1. E. Coli septicemia  2. UTI and Prostatitis  3. s/p Rezum procedure recently  4. HTN  5. MS    - Afebrile, WBC down to 15, has bandemia, blood c/s grew E. Coli likely from recent urologic procedure. positive UA. BP improved    c/w IV Zosyn, d/c'd IV Vancomycin    will f/u sensitivity    Repeat blood c/s in am  - Reviewed CT abd/pelvis no contrast- no abscess but evidence of cystitis and prostatitis   - Urology f/u plan noted, unclear why he needs Palencia- no urinary retention    - Hold all meds for borderline BP  - DVT ppx .

## 2022-02-04 NOTE — PROGRESS NOTE ADULT - PROBLEM SELECTOR PLAN 2
Patient w/ recent Rezum prostate procedure, Palencia removed 3 days ago  Presented w/ fevers, grossly positive UA, dysuria  No evidence of urinary retention on admission, Palencia placed by urology  - F/u cultures  - Start empiric Zosyn (2/3-)  - Start empiric vancomycin (2/3-)  - CTAP w/o contrast as above  - Palencia in place  - Trend WBC, fever curve, vitals Patient w/ recent Rezum prostate procedure  Noted E. coli bacteremia, CTAP w/ prostatitis, cystitis  - F/u culture sensitivities  - Continue Zosyn (2/3-)  - D/c vancomycin  - Palencia in place- will discuss indication for leaving Palencia w/ urology  - Trend WBC, fever curve, vitals

## 2022-02-04 NOTE — PROGRESS NOTE ADULT - SUBJECTIVE AND OBJECTIVE BOX
PROGRESS NOTE:   Authored by Marco Antonio Browne MD  Pager: [facility] [phone]; [facility] [phone]    Patient is a 66y old  Male who presents with a chief complaint of Fever (2022 13:54)      SUBJECTIVE / OVERNIGHT EVENTS:    ADDITIONAL REVIEW OF SYSTEMS:    MEDICATIONS  (STANDING):  heparin   Injectable 5000 Unit(s) SubCutaneous every 8 hours  lactated ringers. 1000 milliLiter(s) (100 mL/Hr) IV Continuous <Continuous>  piperacillin/tazobactam IVPB.. 3.375 Gram(s) IV Intermittent every 8 hours  tamsulosin 0.4 milliGRAM(s) Oral at bedtime  vancomycin  IVPB 1000 milliGRAM(s) IV Intermittent every 24 hours    MEDICATIONS  (PRN):      CAPILLARY BLOOD GLUCOSE        I&O's Summary    2022 07:01  -  2022 07:00  --------------------------------------------------------  IN: 800 mL / OUT: 1600 mL / NET: -800 mL        PHYSICAL EXAM:  Vital Signs Last 24 Hrs  T(C): 37.1 (2022 05:54), Max: 37.9 (2022 10:14)  T(F): 98.8 (2022 05:54), Max: 100.2 (2022 10:14)  HR: 106 (2022 05:54) (90 - 108)  BP: 115/69 (2022 05:54) (80/54 - 115/69)  BP(mean): --  RR: 18 (2022 05:54) (17 - 18)  SpO2: 96% (2022 05:54) (96% - 100%)    GENERAL: No acute distress, well-developed  HEAD:  Atraumatic, Normocephalic  EYES: EOMI, PERRLA, conjunctiva and sclera clear  NECK: Supple, no lymphadenopathy, no JVD  CHEST/LUNG: CTAB; No wheezes, rales, or rhonchi  HEART: Regular rate and rhythm; No murmurs, rubs, or gallops  ABDOMEN: Soft, non-tender, non-distended; normal bowel sounds, no organomegaly  EXTREMITIES:  2+ peripheral pulses b/l, No clubbing, cyanosis, or edema  NEUROLOGY: A&O x 3, no focal deficits  SKIN: No rashes or lesions    LABS:                        10.7   15.85 )-----------( 128      ( 2022 07:06 )             34.4     02-03    133<L>  |  98  |  30<H>  ----------------------------<  99  3.9   |  20<L>  |  1.50<H>    Ca    8.4      2022 10:20    TPro  5.8<L>  /  Alb  3.5  /  TBili  0.7  /  DBili  x   /  AST  51<H>  /  ALT  76<H>  /  AlkPhos  54  02-03    PT/INR - ( 2022 10:20 )   PT: 16.4 sec;   INR: 1.39 ratio         PTT - ( 2022 10:20 )  PTT:31.1 sec      Urinalysis Basic - ( 2022 11:12 )    Color: Light Orange / Appearance: Slightly Turbid / S.014 / pH: x  Gluc: x / Ketone: Negative  / Bili: Negative / Urobili: Negative   Blood: x / Protein: 30 mg/dL / Nitrite: Positive   Leuk Esterase: Large / RBC: 162 /hpf /  /HPF   Sq Epi: x / Non Sq Epi: 0 /hpf / Bacteria: Moderate        Culture - Blood (collected 2022 14:41)  Source: .Blood Blood-Peripheral  Gram Stain (2022 01:32):    Growth in aerobic bottle: Gram Negative Rods  Preliminary Report (2022 01:32):    Growth in aerobic bottle: Gram Negative Rods    ***Blood Panel PCR results on this specimen are available    approximately 3 hours after the Gram stain result.***    Gram stain, PCR, and/or culture results may not always    correspond due to difference in methodologies.    ************************************************************    This PCR assay was performed by multiplex PCR. This    Assay tests for 66 bacterial and resistance gene targets.    Please refer to the [facility] test directory    at [email or web address] for details.  Organism: Blood Culture PCR (2022 03:56)  Organism: Blood Culture PCR (2022 03:56)        RADIOLOGY & ADDITIONAL TESTS: PROGRESS NOTE:   Authored by Marco Antonio Browne MD  Pager: [facility] [phone]; [facility] [phone]    Patient is a 66y old  Male who presents with a chief complaint of Fever (2022 13:54)      SUBJECTIVE / OVERNIGHT EVENTS:  Remained afebrile overnight, blood pressure improved to >100 SBP, tachycardic to 105.  This morning denies symptoms, reports he is feeling much improved overall and has no complaints.      MEDICATIONS  (STANDING):  heparin   Injectable 5000 Unit(s) SubCutaneous every 8 hours  lactated ringers. 1000 milliLiter(s) (100 mL/Hr) IV Continuous <Continuous>  piperacillin/tazobactam IVPB.. 3.375 Gram(s) IV Intermittent every 8 hours  tamsulosin 0.4 milliGRAM(s) Oral at bedtime  vancomycin  IVPB 1000 milliGRAM(s) IV Intermittent every 24 hours    MEDICATIONS  (PRN):      CAPILLARY BLOOD GLUCOSE        I&O's Summary    2022 07:01  -  2022 07:00  --------------------------------------------------------  IN: 800 mL / OUT: 1600 mL / NET: -800 mL        PHYSICAL EXAM:  Vital Signs Last 24 Hrs  T(C): 37.1 (2022 05:54), Max: 37.9 (2022 10:14)  T(F): 98.8 (2022 05:54), Max: 100.2 (2022 10:14)  HR: 106 (2022 05:54) (90 - 108)  BP: 115/69 (2022 05:54) (80/54 - 115/69)  BP(mean): --  RR: 18 (2022 05:54) (17 - 18)  SpO2: 96% (2022 05:54) (96% - 100%)    GENERAL: No acute distress, well-developed, found standing next to the bed  HEAD:  Atraumatic, Normocephalic  CHEST/LUNG: CTAB; No wheezes, rales, or rhonchi  HEART: Regular rate and rhythm; No murmurs, rubs, or gallops  ABDOMEN: Soft, non-tender, non-distended; normal bowel sounds  EXTREMITIES:  2+ peripheral pulses b/l, No clubbing, cyanosis, or edema  NEUROLOGY: A&O x 3, no focal deficits    LABS:                        10.7   15.85 )-----------( 128      ( 2022 07:06 )             34.4     02-03    133<L>  |  98  |  30<H>  ----------------------------<  99  3.9   |  20<L>  |  1.50<H>    Ca    8.4      2022 10:20    TPro  5.8<L>  /  Alb  3.5  /  TBili  0.7  /  DBili  x   /  AST  51<H>  /  ALT  76<H>  /  AlkPhos  54  02-03    PT/INR - ( 2022 10:20 )   PT: 16.4 sec;   INR: 1.39 ratio         PTT - ( 2022 10:20 )  PTT:31.1 sec      Urinalysis Basic - ( 2022 11:12 )    Color: Light Orange / Appearance: Slightly Turbid / S.014 / pH: x  Gluc: x / Ketone: Negative  / Bili: Negative / Urobili: Negative   Blood: x / Protein: 30 mg/dL / Nitrite: Positive   Leuk Esterase: Large / RBC: 162 /hpf /  /HPF   Sq Epi: x / Non Sq Epi: 0 /hpf / Bacteria: Moderate        Culture - Blood (collected 2022 14:41)  Source: .Blood Blood-Peripheral  Gram Stain (2022 01:32):    Growth in aerobic bottle: Gram Negative Rods  Preliminary Report (2022 01:32):    Growth in aerobic bottle: Gram Negative Rods    ***Blood Panel PCR results on this specimen are available    approximately 3 hours after the Gram stain result.***    Gram stain, PCR, and/or culture results may not always    correspond due to difference in methodologies.    ************************************************************    This PCR assay was performed by multiplex PCR. This    Assay tests for 66 bacterial and resistance gene targets.    Please refer to the [facility] test directory    at [email or web address] for details.  Organism: Blood Culture PCR (2022 03:56)  Organism: Blood Culture PCR (2022 03:56)        RADIOLOGY & ADDITIONAL TESTS:

## 2022-02-04 NOTE — DISCHARGE NOTE PROVIDER - CARE PROVIDER_API CALL
Kandi Collier (DO)  Internal Medicine  [facility], [address] [place] [address]  Phone: [phone]  Fax: [phone]  Established Patient  Follow Up Time: 2 weeks    Miguel Awad  UROLOGY  [address] [facility], [address]  [place] [address]  Phone: [phone]  Fax: [phone]  Established Patient  Follow Up Time: 1 week

## 2022-02-04 NOTE — DISCHARGE NOTE PROVIDER - NSDCCPCAREPLAN_GEN_ALL_CORE_FT
PRINCIPAL DISCHARGE DIAGNOSIS  Diagnosis: Sepsis due to urinary tract infection  Assessment and Plan of Treatment: You were admitted with sepsis due to a urinary tract infection. This was likely in the setting of your recent prostate surgery. We also found bacteremia in your blood stream. For these things, you were treated with an IV antibiotic called Zosyn. At the time of discharge, your antibiotic was changed to ________ and your repeat blood cultures did not reveal any further growth of bacteria. <<insert sentence about louise>>. You should plan to follow up with your urologist and primary care doctor for further management.       PRINCIPAL DISCHARGE DIAGNOSIS  Diagnosis: Sepsis due to urinary tract infection  Assessment and Plan of Treatment: You were admitted with sepsis due to a urinary tract infection. This was likely in the setting of your recent prostate surgery. We also found bacteria spereading to your blood stream. For these things, you were treated with an IV antibiotic called Zosyn. At the time of discharge, your antibiotic was changed to levofloxacin (also known as levaquin) and your repeat blood cultures did not reveal any further growth of bacteria. You should plan to follow up with your urologist and primary care doctor for further management.      SECONDARY DISCHARGE DIAGNOSES  Diagnosis: HTN (hypertension)  Assessment and Plan of Treatment: You were previously diagnosed with high blood pressure. Because of infection which can drive blood pressures down, we held hydrochlorathiazide and losartan to prevent low blood pressure. While in the hospital, your blood pressure was within goal so these medications were not resumed. Please talk to your primary care doctor for follow up and see if you will need to restart them based on your blood pressure.    Diagnosis: Adrenal adenoma  Assessment and Plan of Treatment: CT scan of your abdomen showed an incidental mass in the left adrenal gland (sits atop of kidney). This is a non cancerous finding at often times do not cause any problems. Please follow up with your primary care to monitor the mass over time, and if you begin to develop abdominal pain and or worsening blood pressure despite medications, please immediately let your primary care doctor know.

## 2022-02-04 NOTE — DISCHARGE NOTE PROVIDER - CARE PROVIDERS DIRECT ADDRESSES
[email or web address]

## 2022-02-04 NOTE — PROGRESS NOTE ADULT - PROBLEM SELECTOR PLAN 1
Patient meets criteria for severe sepsis w/ HR>90, WBC>12, T>38 at home, hypotension, elevated lactate, bandemia  UA w/ hematuria, pyuria, large LE, positive nitrite  Likely i/s/o urinary tract infection given recent surgery & Palenica  s/p 6L IVF  - Blood cultures sent, f/u results  - Urine cultures sent, f/u results  - Start empiric Zosyn (2/3-)  - Start empiric vancomycin (2/3-)  - Trend lactate  - CTAP w/o contrast to r/o abscess given recent instrumentation  - Monitor WBC, trend fever curve, vitals  - Management of UTI as per below  - If patient's blood pressure not responding to IVF, MICU consult Improving  Patient meets criteria for severe sepsis w/ HR>90, WBC>12, T>38 at home, hypotension, elevated lactate, bandemia  Blood cultures with E. coli  CTAP w/ evidence of prostatitis, cystitis  - F/u blood culture sensitivities  - Urine cultures sent, f/u results  - Continue Zosyn (2/3-)  - D/c vancomycin  - Monitor WBC, trend fever curve, vitals  - Management of UTI as per below

## 2022-02-04 NOTE — DISCHARGE NOTE PROVIDER - NSDCFUADDAPPT_GEN_ALL_CORE_FT
1. You should follow up with your primary care doctor to discuss your hospitalization and recheck some of your labs (CBC, CMP) to ensure resolution of your symptoms.   2. You should follow up with your urologist for ongoing management of your prostate condition and discuss further management.  3. You should discuss with your primary care doctor the findings noted on the CT of your abdomen, specifically an adenoma noted in your adrenal gland. They can guide you on pursuing further evaluation as needed.

## 2022-02-04 NOTE — DISCHARGE NOTE PROVIDER - NSDCCPTREATMENT_GEN_ALL_CORE_FT
PRINCIPAL PROCEDURE  Procedure: CT abdomen pelvis  Findings and Treatment: PROCEDURE DATE:  02/03/2022    .  PROCEDURE:  CT of the Abdomen and Pelvis was performed.  Sagittal and coronal reformats were performed.  .  FINDINGS:  Limited evaluation of the vasculature and viscera in the absence of   intravenous contrast.  LOWER CHEST: Minimal bibasilar atelectasis.  LIVER: Normal morphology. Subcentimeter calcified granuloma in the   central liver.  BILE DUCTS: Normal caliber.  GALLBLADDER: Within normal limits.  SPLEEN: Within normal limits.  PANCREAS: Within normal limits.  ADRENALS: The right adrenal gland is within normal limits. There is a 4.6   x 3.7 cm left adrenal nodule with Hounsfield units less than 10,   compatible with an adenoma.  KIDNEYS/URETERS: Bilateral perinephric stranding, nonspecific. Left renal sinus cysts. Possible right renal sinus cysts versus mild calyceal fullness. No hydronephrosis. Nonobstructing 2 mm left renal calculus.  BLADDER: Underdistended around a Palencia catheter with expected   intravesicular air. Diffuse bladder wall thickening may at least be in   part due to underdistention. However, perivesicular fat stranding is   suggestive of cystitis.  REPRODUCTIVE ORGANS: The prostate is enlarged with mild periprostatic fat   stranding. Stranding extends around the seminal vesicles.  BOWEL: No bowel obstruction. Mild rectal wall thickening and perirectal   fatty infiltration.  PERITONEUM: No ascites.  VESSELS: Mild atherosclerotic changes.  RETROPERITONEUM/LYMPH NODES: No lymphadenopathy.  ABDOMINAL WALL: Small bilateral fat-containing inguinal hernias.  BONES: Degenerative changes of the spine.  IMPRESSION:  Bladder wall thickening with perivesicular inflammatory changes,   suggestive of cystitis.  Enlarged prostate. Mild periprostatic inflammatory changes are suggestive   of prostatitis, potentially post procedural or infectious in etiology.  Findings of proctitis, potentially reactive.  Left adrenal adenoma measuring 4.6 cm.

## 2022-02-04 NOTE — CONSULT NOTE ADULT - SUBJECTIVE AND OBJECTIVE BOX
Urology Consult Note    Chief Complaint: hypotension      History of Present Illness:  Patient is a 66y Male who presented with fever, hypotension, rigors x 1 day. pt s/p rezume prostate procedure ~10 days ago.  Had louise removed 3 days ago and complains of low blood pressure and fever to 104 at home.  Complainng of some LUTS with PVR around 200. Was seen and had louise placed by  team. febrile and hypotensive in ED, started on vanc and zosyn. WBC 18 now down to 15 this morning. Still slightly tachycardic but reports feeling significantly better. Louise draining yellow urine.      PAST MEDICAL & SURGICAL HISTORY:  HTN (hypertension)    HLD (hyperlipidemia)    BPH (benign prostatic hyperplasia)    No significant past surgical history        FAMILY HISTORY:      Allergies    No Known Allergies    Intolerances        Social History:  Denies tobacco or alcohol use    Review of Systems:   Constitutional: No weight loss, no weakness  HEENT: No visual loss, no hearing loss, no sneezing  Skin: No rash or itching  CV: No chest pain, no chest pressure  Pulm: No shortness of breath, cough, or sputum  GI: No melena, no constipation  : Per HPI  Neuro: No headache, dizziness  MSK: No muscle pain, no joint pain  Heme: No anemia, bruising, or bleeding  Lymphatics: No enlarged nodes, no history of splenectomy  Psych: No depression of anxiety  Endo: No cold or heat intolerance  Allergies: No asthma, hives    Physical Exam:  Vital signs  T(C): 37.1 (22 @ 05:54), Max: 37.9 (22 @ 10:14)  HR: 106 (22 @ 05:54)  BP: 115/69 (22 @ 05:54)  SpO2: 96% (22 @ 05:54)  Wt(kg): --    Gen: No acute distress. Normal mood  HEENT: Normocephalic, neck supple  CV: Hemodynamically stable  Pulm: No increased work of breathing  Abd: soft, non-tender, non-distended  Back: No CVA tenderness, no midline pain  Extremities: No significant deformity or joint abnormality  Neuro: Alert & oriented x3, No focal deficits  Skin: Skin normal color, normal texture  Psych: Normal affect, normal behavior  : Nonpalpable bladder louise in place yellow urine      Labs:      02-04 @ 07:06    WBC 15.85 / Hct 34.4  / SCr --       02-03 @ 10:20    WBC 18.88 / Hct 33.3  / SCr 1.50     02-    133<L>  |  98  |  30<H>  ----------------------------<  99  3.9   |  20<L>  |  1.50<H>    Ca    8.4      2022 10:20    TPro  5.8<L>  /  Alb  3.5  /  TBili  0.7  /  DBili  x   /  AST  51<H>  /  ALT  76<H>  /  AlkPhos  54  02-03    PT/INR - ( 2022 10:20 )   PT: 16.4 sec;   INR: 1.39 ratio         PTT - ( 2022 10:20 )  PTT:31.1 sec  Urinalysis Basic - ( 2022 11:12 )    Color: Light Orange / Appearance: Slightly Turbid / S.014 / pH: x  Gluc: x / Ketone: Negative  / Bili: Negative / Urobili: Negative   Blood: x / Protein: 30 mg/dL / Nitrite: Positive   Leuk Esterase: Large / RBC: 162 /hpf /  /HPF   Sq Epi: x / Non Sq Epi: 0 /hpf / Bacteria: Moderate

## 2022-02-04 NOTE — DISCHARGE NOTE PROVIDER - PROVIDER TOKENS
PROVIDER:[TOKEN:[[id number]],FOLLOWUP:[2 weeks],ESTABLISHEDPATIENT:[T]],PROVIDER:[TOKEN:[[id number]],FOLLOWUP:[1 week],ESTABLISHEDPATIENT:[T]]

## 2022-02-04 NOTE — DISCHARGE NOTE PROVIDER - HOSPITAL COURSE
Mr. Mulligan is a 67 yo M with a history of multiple sclerosis (not on meds), pre-DM, HTN, BPH who presents 9 days following Rezum prostate procedure with fevers, chills, hypotension. He has a history of BPH that has previously been managed with tamsulosin. He underwent Rezum procedure with Dr. wAad on 1/25 and was d/c'ed home without complications. He had Palencia removed 3 days ago and reports fevers to 104F yesterday with rigors and low blood pressure. In addition, he reports significant weakness, dysuria & dull abdominal pain. He denies syncope, cough, shortness of breath, chest pain, N/V/D, flank pain, urinary retention, skin changes, lower extremity swelling.     In the ED, he was afebrile, hypotensive to 92/55, tachycardic to 108, O2 sat % on RA. WBC was 19 w/ 19% bands, hgb 10.6, platelets 144. Na 133, BUN/Cr 30/1.50. AST/ALT 51/76. Lactate 4.6-->3.6. UA w/ 162 RBC, 194 WBC, moderate bacteria, large LE, positive nitrite. EKG showed sinus tachycardia. Post-void residual showed 200cc urine. Urology was called for placement of Palencia. He was given Tylenol, CTX x1, 3x 500cc bolus NS with improvement of BP to 92/68. He was admitted to medicine for further management.     Hospital Course:   Mr. Mulligan is a 65 yo M with a history of multiple sclerosis (not on meds), pre-DM, HTN, BPH who presents 9 days following Rezum prostate procedure with fevers, chills, hypotension. He has a history of BPH that has previously been managed with tamsulosin. He underwent Rezum procedure with Dr. Awad on 1/25 and was d/c'ed home without complications. He had Palencia removed 3 days ago and reports fevers to 104F yesterday with rigors and low blood pressure. In addition, he reports significant weakness, dysuria & dull abdominal pain. He denies syncope, cough, shortness of breath, chest pain, N/V/D, flank pain, urinary retention, skin changes, lower extremity swelling.     In the ED, he was afebrile, hypotensive to 92/55, tachycardic to 108, O2 sat % on RA. WBC was 19 w/ 19% bands, hgb 10.6, platelets 144. Na 133, BUN/Cr 30/1.50. AST/ALT 51/76. Lactate 4.6-->3.6. UA w/ 162 RBC, 194 WBC, moderate bacteria, large LE, positive nitrite. EKG showed sinus tachycardia. Post-void residual showed 200cc urine. Urology was called for placement of Palencia. He was given Tylenol, CTX x1, 3x 500cc bolus NS with improvement of BP to 92/68. He was admitted to medicine for further management.     Hospital Course:    He was started on empiric Zosyn for presumed sepsis 2/2 UTI. CTAP showed prostatitis and cystitis. Repeat cultures revealed _____________. At time of discharge his Palencia was _________. He will be discharged home with antibiotics as follows: _______________.     He was also noted to have an adrenal adenoma on CTAP. He will be instructed to f/u with his PMD for further workup and management. Mr. Mulligan is a 65 yo M with a history of multiple sclerosis (not on meds), pre-DM, HTN, BPH who presents 9 days following Rezum prostate procedure with fevers, chills, hypotension. He has a history of BPH that has previously been managed with tamsulosin. He underwent Rezum procedure with Dr. Awad on 1/25 and was d/c'ed home without complications. He had Palencia removed 3 days ago and reports fevers to 104F yesterday with rigors and low blood pressure. In addition, he reports significant weakness, dysuria & dull abdominal pain. He denies syncope, cough, shortness of breath, chest pain, N/V/D, flank pain, urinary retention, skin changes, lower extremity swelling.     In the ED, he was afebrile, hypotensive to 92/55, tachycardic to 108, O2 sat % on RA. WBC was 19 w/ 19% bands, hgb 10.6, platelets 144. Na 133, BUN/Cr 30/1.50. AST/ALT 51/76. Lactate 4.6-->3.6. UA w/ 162 RBC, 194 WBC, moderate bacteria, large LE, positive nitrite. EKG showed sinus tachycardia. Post-void residual showed 200cc urine. Urology was called for placement of Palencia. He was given Tylenol, CTX x1, 3x 500cc bolus NS with improvement of BP to 92/68. He was admitted to medicine for further management.     Hospital Course:  CT a/p revealed cystitis, prostatitis, and incidental 4.6 x 3.7 cm left adrenal adenoma. Right adrenal gland was normal. Pt was seen by urology, there were no prostatic tenderness on exam and pt was found to have pan-sensitive E. Coli bacteremia, which is likely in s/o recent instrumentation. Abx was narrowed from ceftriaxone to levofloxacin and pt will complete a 14 day course (end on 2/16) as per ID recommendation. Pt also had an MYRANDA on admission which improved with fluid resuscitation and abx. Pt's BP med (losartan and HCTZ) were held given MYRANDA and sepsis. Pt will f/u with PMD to discuss resuming BP meds, monitoring of adrenal adenoma, and completion of abx. Mr. Mulligan is a 67 yo M with a history of multiple sclerosis (not on meds), pre-DM, HTN, BPH who presents 9 days following Rezum prostate procedure with fevers, chills, hypotension. He has a history of BPH that has previously been managed with tamsulosin. He underwent Rezum procedure with Dr. Awad on 1/25 and was d/c'ed home without complications. He had Palencia removed 3 days ago and reports fevers to 104F yesterday with rigors and low blood pressure. In addition, he reports significant weakness, dysuria & dull abdominal pain. He denies syncope, cough, shortness of breath, chest pain, N/V/D, flank pain, urinary retention, skin changes, lower extremity swelling.     In the ED, he was afebrile, hypotensive to 92/55, tachycardic to 108, O2 sat % on RA. WBC was 19 w/ 19% bands, hgb 10.6, platelets 144. Na 133, BUN/Cr 30/1.50. AST/ALT 51/76. Lactate 4.6-->3.6. UA w/ 162 RBC, 194 WBC, moderate bacteria, large LE, positive nitrite. EKG showed sinus tachycardia. Post-void residual showed 200cc urine. Urology was called for placement of Palencia. He was given Tylenol, CTX x1, 3x 500cc bolus NS with improvement of BP to 92/68. He was admitted to medicine for further management.     Hospital Course:  CT a/p revealed cystitis, prostatitis, and incidental 4.6 x 3.7 cm left adrenal adenoma. Right adrenal gland was normal. Pt was seen by urology, there were no prostatic tenderness on exam and pt was found to have pan-sensitive E. Coli bacteremia, which is likely in s/o recent instrumentation. Abx was narrowed from ceftriaxone to levofloxacin and pt will complete a 14 day course (end on 2/16) as per ID recommendation. Pt also had an MYRANDA on admission which improved with fluid resuscitation and abx. Pt's BP med (losartan and HCTZ) were held given MYRANDA and sepsis. Pt will f/u with PMD to discuss resuming BP meds, monitoring of adrenal adenoma, and completion of abx.     Pt incidentally found to have lip vesicular lesions suggestive of oral herpes and will be given 7day course of valcyclovir.

## 2022-02-04 NOTE — PROGRESS NOTE ADULT - ASSESSMENT
Mr. Mulligan is a 67 yo M with a history of multiple sclerosis (not on meds), pre-DM, HTN, BPH who presents 9 days following Rezum prostate procedure with fevers, chills, hypotension, likely i/s/o severe sepsis 2/2 urinary tract infection. Mr. Mulligan is a 65 yo M with a history of multiple sclerosis (not on meds), pre-DM, HTN, BPH who presents 9 days following Rezum prostate procedure with fevers, chills, hypotension, likely i/s/o severe sepsis, found to have E. coli bacteremia & evidence of prostatitis & cystitis on CTAP.

## 2022-02-04 NOTE — DISCHARGE NOTE PROVIDER - NSDCMRMEDTOKEN_GEN_ALL_CORE_FT
hydroCHLOROthiazide 25 mg oral tablet: 1 tab(s) orally once a day  losartan 100 mg oral tablet: 1 tab(s) orally once a day  metoprolol succinate 25 mg oral tablet, extended release: 1 tab(s) orally once a day  tamsulosin 0.4 mg oral capsule: 1 cap(s) orally once a day   levoFLOXacin 500 mg oral tablet: 1 tab(s) orally every 24 hours from 2/9-2/16  metoprolol succinate 25 mg oral tablet, extended release: 1 tab(s) orally once a day  phenazopyridine 200 mg oral tablet: 1 tab(s) orally every 8 hours  tamsulosin 0.4 mg oral capsule: 2 cap(s) orally once a day (at bedtime)   levoFLOXacin 500 mg oral tablet: 1 tab(s) orally every 24 hours from 2/9-2/16  metoprolol succinate 25 mg oral tablet, extended release: 1 tab(s) orally once a day  phenazopyridine 200 mg oral tablet: 1 tab(s) orally every 8 hours  tamsulosin 0.4 mg oral capsule: 2 cap(s) orally once a day (at bedtime)  Valtrex 1 g oral tablet: 1 tab(s) orally 2 times a day

## 2022-02-05 LAB
-  AMIKACIN: SIGNIFICANT CHANGE UP
-  AMPICILLIN/SULBACTAM: SIGNIFICANT CHANGE UP
-  AMPICILLIN: SIGNIFICANT CHANGE UP
-  AZTREONAM: SIGNIFICANT CHANGE UP
-  CEFAZOLIN: SIGNIFICANT CHANGE UP
-  CEFEPIME: SIGNIFICANT CHANGE UP
-  CEFOXITIN: SIGNIFICANT CHANGE UP
-  CEFTRIAXONE: SIGNIFICANT CHANGE UP
-  CIPROFLOXACIN: SIGNIFICANT CHANGE UP
-  ERTAPENEM: SIGNIFICANT CHANGE UP
-  GENTAMICIN: SIGNIFICANT CHANGE UP
-  IMIPENEM: SIGNIFICANT CHANGE UP
-  LEVOFLOXACIN: SIGNIFICANT CHANGE UP
-  MEROPENEM: SIGNIFICANT CHANGE UP
-  PIPERACILLIN/TAZOBACTAM: SIGNIFICANT CHANGE UP
-  TOBRAMYCIN: SIGNIFICANT CHANGE UP
-  TRIMETHOPRIM/SULFAMETHOXAZOLE: SIGNIFICANT CHANGE UP
ANION GAP SERPL CALC-SCNC: 13 MMOL/L — SIGNIFICANT CHANGE UP (ref 5–17)
BUN SERPL-MCNC: 21 MG/DL — SIGNIFICANT CHANGE UP (ref 7–23)
CALCIUM SERPL-MCNC: 8.9 MG/DL — SIGNIFICANT CHANGE UP (ref 8.4–10.5)
CHLORIDE SERPL-SCNC: 109 MMOL/L — HIGH (ref 96–108)
CO2 SERPL-SCNC: 20 MMOL/L — LOW (ref 22–31)
CREAT SERPL-MCNC: 0.97 MG/DL — SIGNIFICANT CHANGE UP (ref 0.5–1.3)
CULTURE RESULTS: SIGNIFICANT CHANGE UP
GLUCOSE SERPL-MCNC: 115 MG/DL — HIGH (ref 70–99)
HCT VFR BLD CALC: 31.8 % — LOW (ref 39–50)
HGB BLD-MCNC: 10.1 G/DL — LOW (ref 13–17)
MAGNESIUM SERPL-MCNC: 1.8 MG/DL — SIGNIFICANT CHANGE UP (ref 1.6–2.6)
MCHC RBC-ENTMCNC: 20 PG — LOW (ref 27–34)
MCHC RBC-ENTMCNC: 31.8 GM/DL — LOW (ref 32–36)
MCV RBC AUTO: 63 FL — LOW (ref 80–100)
METHOD TYPE: SIGNIFICANT CHANGE UP
NRBC # BLD: 0 /100 WBCS — SIGNIFICANT CHANGE UP (ref 0–0)
ORGANISM # SPEC MICROSCOPIC CNT: SIGNIFICANT CHANGE UP
PHOSPHATE SERPL-MCNC: 2.1 MG/DL — LOW (ref 2.5–4.5)
PLATELET # BLD AUTO: 129 K/UL — LOW (ref 150–400)
POTASSIUM SERPL-MCNC: 4.1 MMOL/L — SIGNIFICANT CHANGE UP (ref 3.5–5.3)
POTASSIUM SERPL-SCNC: 4.1 MMOL/L — SIGNIFICANT CHANGE UP (ref 3.5–5.3)
RBC # BLD: 5.05 M/UL — SIGNIFICANT CHANGE UP (ref 4.2–5.8)
RBC # FLD: 16.3 % — HIGH (ref 10.3–14.5)
SODIUM SERPL-SCNC: 142 MMOL/L — SIGNIFICANT CHANGE UP (ref 135–145)
SPECIMEN SOURCE: SIGNIFICANT CHANGE UP
WBC # BLD: 11.6 K/UL — HIGH (ref 3.8–10.5)
WBC # FLD AUTO: 11.6 K/UL — HIGH (ref 3.8–10.5)

## 2022-02-05 PROCEDURE — 99233 SBSQ HOSP IP/OBS HIGH 50: CPT

## 2022-02-05 RX ORDER — METOPROLOL TARTRATE 50 MG
12.5 TABLET ORAL
Refills: 0 | Status: DISCONTINUED | OUTPATIENT
Start: 2022-02-05 | End: 2022-02-08

## 2022-02-05 RX ADMIN — Medication 12.5 MILLIGRAM(S): at 18:16

## 2022-02-05 RX ADMIN — PIPERACILLIN AND TAZOBACTAM 25 GRAM(S): 4; .5 INJECTION, POWDER, LYOPHILIZED, FOR SOLUTION INTRAVENOUS at 15:37

## 2022-02-05 RX ADMIN — HEPARIN SODIUM 5000 UNIT(S): 5000 INJECTION INTRAVENOUS; SUBCUTANEOUS at 13:48

## 2022-02-05 RX ADMIN — HEPARIN SODIUM 5000 UNIT(S): 5000 INJECTION INTRAVENOUS; SUBCUTANEOUS at 06:14

## 2022-02-05 RX ADMIN — PIPERACILLIN AND TAZOBACTAM 25 GRAM(S): 4; .5 INJECTION, POWDER, LYOPHILIZED, FOR SOLUTION INTRAVENOUS at 06:15

## 2022-02-05 RX ADMIN — PIPERACILLIN AND TAZOBACTAM 25 GRAM(S): 4; .5 INJECTION, POWDER, LYOPHILIZED, FOR SOLUTION INTRAVENOUS at 22:56

## 2022-02-05 RX ADMIN — TAMSULOSIN HYDROCHLORIDE 0.4 MILLIGRAM(S): 0.4 CAPSULE ORAL at 21:30

## 2022-02-05 RX ADMIN — HEPARIN SODIUM 5000 UNIT(S): 5000 INJECTION INTRAVENOUS; SUBCUTANEOUS at 21:31

## 2022-02-05 NOTE — PROGRESS NOTE ADULT - PROBLEM SELECTOR PLAN 2
Patient w/ recent Rezum prostate procedure  Noted E. coli bacteremia, CTAP w/ prostatitis, cystitis  - F/u culture sensitivities  - Continue Zosyn (2/3-)  - Palencia in place- will discuss indication for leaving Palencia w/ urology  - Trend WBC, fever curve, vitals

## 2022-02-05 NOTE — PROGRESS NOTE ADULT - ASSESSMENT
Mr. Mulligan is a 67 yo M with a history of multiple sclerosis (not on meds), pre-DM, HTN, BPH who presents 9 days following Rezum prostate procedure with fevers, chills, hypotension, likely i/s/o severe sepsis, found to have E. coli bacteremia & evidence of prostatitis & cystitis on CTAP.

## 2022-02-05 NOTE — PHYSICAL THERAPY INITIAL EVALUATION ADULT - PERTINENT HX OF CURRENT PROBLEM, REHAB EVAL
67 y/o M with PMH: MS (not on meds), pre-DM, HTN, BPH p/w 9 days following Rezum prostate procedure with fevers, chills, hypotension. He underwent Rezum procedure with Dr. Awad on 1/25 and was d/c'ed home w/o complications. Palencia removed 3 days ago and reports fevers to 104F yesterday with rigors and low BP CONT BELOW

## 2022-02-05 NOTE — PHYSICAL THERAPY INITIAL EVALUATION ADULT - PRECAUTIONS/LIMITATIONS, REHAB EVAL
+weakness, dysuria & dull abdominal pain. In the ED, hypotensive (92/55), tachycardic (108). Pt likely i/s/o severe sepsis 2/2 UTI. CT ABD/PELVIS (2/3): Bladder wall thickening with perivesicular inflammatory changes, suggestive of cystitis. Enlarged prostate. Mild periprostatic inflammatory changes are suggestive of prostatitis, potentially post procedural or infectious in etiology. Proctitis, potentially reactive.L  adrenal adenoma (4.6 cm)./fall precautions

## 2022-02-05 NOTE — PHYSICAL THERAPY INITIAL EVALUATION ADULT - ADDITIONAL COMMENTS
Pt lives with spouse and daughter in a PH +6STE, all needs met on main level. Pt was amb (I) without an AD and (I) with all ADLs PTA.

## 2022-02-05 NOTE — PROGRESS NOTE ADULT - SUBJECTIVE AND OBJECTIVE BOX
PROGRESS NOTE:   Authored by Marco Antonio Browne MD  Pager: [facility] [phone]; [facility] [phone]    Patient is a 66y old  Male who presents with a chief complaint of Fever (2022 14:09)      SUBJECTIVE / OVERNIGHT EVENTS:  No acute events overnight.    MEDICATIONS  (STANDING):  heparin   Injectable 5000 Unit(s) SubCutaneous every 8 hours  piperacillin/tazobactam IVPB.. 3.375 Gram(s) IV Intermittent every 8 hours  tamsulosin 0.4 milliGRAM(s) Oral at bedtime    MEDICATIONS  (PRN):      CAPILLARY BLOOD GLUCOSE        I&O's Summary    2022 07:01  -  2022 07:00  --------------------------------------------------------  IN: 1140 mL / OUT: 950 mL / NET: 190 mL    2022 07:01  -  2022 07:44  --------------------------------------------------------  IN: 0 mL / OUT: 1100 mL / NET: -1100 mL        PHYSICAL EXAM:  Vital Signs Last 24 Hrs  T(C): 37.1 (2022 05:41), Max: 37.8 (2022 20:54)  T(F): 98.7 (2022 05:41), Max: 100.1 (2022 20:54)  HR: 96 (2022 05:41) (94 - 105)  BP: 143/84 (2022 05:41) (106/71 - 143/84)  BP(mean): --  RR: 18 (2022 05:41) (18 - 18)  SpO2: 95% (2022 05:41) (95% - 97%)    GENERAL: No acute distress, well-developed, found standing next to the bed  HEAD:  Atraumatic, Normocephalic  CHEST/LUNG: CTAB; No wheezes, rales, or rhonchi  HEART: Regular rate and rhythm; No murmurs, rubs, or gallops  ABDOMEN: Soft, non-tender, non-distended; normal bowel sounds  EXTREMITIES:  2+ peripheral pulses b/l, No clubbing, cyanosis, or edema  NEUROLOGY: A&O x 3, no focal deficits    LABS:                        10.7   15.85 )-----------( 128      ( 2022 07:06 )             34.4     02-04    141  |  106  |  23  ----------------------------<  96  3.5   |  22  |  0.98    Ca    8.9      2022 07:30  Phos  1.5     02-04  Mg     1.7     02-04    TPro  5.9<L>  /  Alb  3.3  /  TBili  0.5  /  DBili  x   /  AST  32  /  ALT  49<H>  /  AlkPhos  61  02-04    PT/INR - ( 2022 10:20 )   PT: 16.4 sec;   INR: 1.39 ratio         PTT - ( 2022 10:20 )  PTT:31.1 sec      Urinalysis Basic - ( 2022 11:12 )    Color: Light Orange / Appearance: Slightly Turbid / S.014 / pH: x  Gluc: x / Ketone: Negative  / Bili: Negative / Urobili: Negative   Blood: x / Protein: 30 mg/dL / Nitrite: Positive   Leuk Esterase: Large / RBC: 162 /hpf /  /HPF   Sq Epi: x / Non Sq Epi: 0 /hpf / Bacteria: Moderate        Culture - Blood (collected 2022 14:41)  Source: .Blood Blood-Peripheral  Gram Stain (2022 01:32):    Growth in aerobic bottle: Gram Negative Rods  Preliminary Report (2022 20:43):    Growth in aerobic bottle: Escherichia coli    ***Blood Panel PCR results on this specimen are available    approximately 3 hours after the Gram stain result.***    Gram stain, PCR, and/or culture results may not always    correspond due to difference in methodologies.    ************************************************************    This PCR assay was performed by multiplex PCR. This    Assay tests for 66 bacterial and resistance gene targets.    Please refer to the [facility] test directory    at [email or web address] for details.  Organism: Blood Culture PCR (2022 03:56)  Organism: Blood Culture PCR (2022 03:56)    Culture - Blood (collected 2022 14:41)  Source: .Blood Blood-Peripheral  Preliminary Report (2022 15:02):    No growth to date.        RADIOLOGY & ADDITIONAL TESTS:   PROGRESS NOTE:   Authored by Marco Antonio Browne MD  Pager: [facility] [phone]; [facility] [phone]    Patient is a 66y old  Male who presents with a chief complaint of Fever (2022 14:09)      SUBJECTIVE / OVERNIGHT EVENTS:  No acute events overnight. Patient reports mild chills overnight with elevated temp & continued dull lower abdominal pain.  Denies any new symptoms.     MEDICATIONS  (STANDING):  heparin   Injectable 5000 Unit(s) SubCutaneous every 8 hours  piperacillin/tazobactam IVPB.. 3.375 Gram(s) IV Intermittent every 8 hours  tamsulosin 0.4 milliGRAM(s) Oral at bedtime    MEDICATIONS  (PRN):      CAPILLARY BLOOD GLUCOSE        I&O's Summary    2022 07:01  -  2022 07:00  --------------------------------------------------------  IN: 1140 mL / OUT: 950 mL / NET: 190 mL    2022 07:01  -  2022 07:44  --------------------------------------------------------  IN: 0 mL / OUT: 1100 mL / NET: -1100 mL        PHYSICAL EXAM:  Vital Signs Last 24 Hrs  T(C): 37.1 (2022 05:41), Max: 37.8 (2022 20:54)  T(F): 98.7 (2022 05:41), Max: 100.1 (2022 20:54)  HR: 96 (2022 05:41) (94 - 105)  BP: 143/84 (2022 05:41) (106/71 - 143/84)  BP(mean): --  RR: 18 (2022 05:41) (18 - 18)  SpO2: 95% (2022 05:41) (95% - 97%)    GENERAL: No acute distress, well-developed, found standing next to the bed  HEAD:  Atraumatic, Normocephalic  CHEST/LUNG: CTAB; No wheezes, rales, or rhonchi  HEART: Regular rate and rhythm; No murmurs, rubs, or gallops  ABDOMEN: Soft, non-distended; normal bowel sounds; mild suprapubic tenderness  EXTREMITIES:  2+ peripheral pulses b/l, No clubbing, cyanosis, or edema  NEUROLOGY: A&O x 3, no focal deficits    LABS:                        10.7   15.85 )-----------( 128      ( 2022 07:06 )             34.4     02-04    141  |  106  |  23  ----------------------------<  96  3.5   |  22  |  0.98    Ca    8.9      2022 07:30  Phos  1.5     02-04  Mg     1.7     02-04    TPro  5.9<L>  /  Alb  3.3  /  TBili  0.5  /  DBili  x   /  AST  32  /  ALT  49<H>  /  AlkPhos  61  02-04    PT/INR - ( 2022 10:20 )   PT: 16.4 sec;   INR: 1.39 ratio         PTT - ( 2022 10:20 )  PTT:31.1 sec      Urinalysis Basic - ( 2022 11:12 )    Color: Light Orange / Appearance: Slightly Turbid / S.014 / pH: x  Gluc: x / Ketone: Negative  / Bili: Negative / Urobili: Negative   Blood: x / Protein: 30 mg/dL / Nitrite: Positive   Leuk Esterase: Large / RBC: 162 /hpf /  /HPF   Sq Epi: x / Non Sq Epi: 0 /hpf / Bacteria: Moderate        Culture - Blood (collected 2022 14:41)  Source: .Blood Blood-Peripheral  Gram Stain (2022 01:32):    Growth in aerobic bottle: Gram Negative Rods  Preliminary Report (2022 20:43):    Growth in aerobic bottle: Escherichia coli    ***Blood Panel PCR results on this specimen are available    approximately 3 hours after the Gram stain result.***    Gram stain, PCR, and/or culture results may not always    correspond due to difference in methodologies.    ************************************************************    This PCR assay was performed by multiplex PCR. This    Assay tests for 66 bacterial and resistance gene targets.    Please refer to the [facility] test directory    at [email or web address] for details.  Organism: Blood Culture PCR (2022 03:56)  Organism: Blood Culture PCR (2022 03:56)    Culture - Blood (collected 2022 14:41)  Source: .Blood Blood-Peripheral  Preliminary Report (2022 15:02):    No growth to date.        RADIOLOGY & ADDITIONAL TESTS:

## 2022-02-05 NOTE — PROGRESS NOTE ADULT - PROBLEM SELECTOR PLAN 8
DVT ppx: SCDs  Diet: CC/DASH diet  Dispo: Pending medical management, likely home DVT ppx: SCDs  Diet: CC/DASH diet  Dispo: Pending management, PT consult

## 2022-02-05 NOTE — PROGRESS NOTE ADULT - PROBLEM SELECTOR PLAN 1
Improving  Patient meets criteria for severe sepsis w/ HR>90, WBC>12, T>38 at home, hypotension, elevated lactate, bandemia  Blood cultures with E. coli  CTAP w/ evidence of prostatitis, cystitis  - F/u blood culture sensitivities  - Urine cultures sent, f/u results  - Continue Zosyn (2/3-)  - Monitor WBC, trend fever curve, vitals  - Management of UTI as per below

## 2022-02-05 NOTE — PROGRESS NOTE ADULT - ATTENDING COMMENTS
1. E. Coli bacteremia, sepsis resolved  2. UTI and Prostatitis  3. s/p Rezum procedure recently  4. HTN  5. MS    - Tmax 100.1, WBC down to 11, blood c/s grew E. Coli likely from recent urologic procedure. positive UA. BP improved    c/w IV Zosyn,, repeat blood c/s done this am    will f/u c/s  - Reviewed CT abd/pelvis no contrast- no abscess but evidence of cystitis and prostatitis   - Urology f/u plan noted, unclear why he needs Palencia- no urinary retention    - Resume low dose metoprolol 12.5mg bid  - DVT ppx . PT rec no skilled PT needed

## 2022-02-05 NOTE — PROGRESS NOTE ADULT - PROBLEM SELECTOR PLAN 4
On home HCTZ 25mg, losartan 100mg, metoprolol succinate 25mg  - Hold antihypertensives i/s/o hypotension On home HCTZ 25mg, losartan 100mg, metoprolol succinate 25mg  - Start metoprolol tartrate 12.5mg bid  -->Can transition to succinate 25mg if tolerating

## 2022-02-06 LAB
-  AMIKACIN: SIGNIFICANT CHANGE UP
-  AMOXICILLIN/CLAVULANIC ACID: SIGNIFICANT CHANGE UP
-  AMPICILLIN/SULBACTAM: SIGNIFICANT CHANGE UP
-  AMPICILLIN: SIGNIFICANT CHANGE UP
-  AZTREONAM: SIGNIFICANT CHANGE UP
-  CEFAZOLIN: SIGNIFICANT CHANGE UP
-  CEFEPIME: SIGNIFICANT CHANGE UP
-  CEFOXITIN: SIGNIFICANT CHANGE UP
-  CEFTRIAXONE: SIGNIFICANT CHANGE UP
-  CIPROFLOXACIN: SIGNIFICANT CHANGE UP
-  ERTAPENEM: SIGNIFICANT CHANGE UP
-  GENTAMICIN: SIGNIFICANT CHANGE UP
-  IMIPENEM: SIGNIFICANT CHANGE UP
-  LEVOFLOXACIN: SIGNIFICANT CHANGE UP
-  MEROPENEM: SIGNIFICANT CHANGE UP
-  NITROFURANTOIN: SIGNIFICANT CHANGE UP
-  PIPERACILLIN/TAZOBACTAM: SIGNIFICANT CHANGE UP
-  TIGECYCLINE: SIGNIFICANT CHANGE UP
-  TOBRAMYCIN: SIGNIFICANT CHANGE UP
-  TRIMETHOPRIM/SULFAMETHOXAZOLE: SIGNIFICANT CHANGE UP
ANION GAP SERPL CALC-SCNC: 12 MMOL/L — SIGNIFICANT CHANGE UP (ref 5–17)
BUN SERPL-MCNC: 15 MG/DL — SIGNIFICANT CHANGE UP (ref 7–23)
CALCIUM SERPL-MCNC: 9.1 MG/DL — SIGNIFICANT CHANGE UP (ref 8.4–10.5)
CHLORIDE SERPL-SCNC: 106 MMOL/L — SIGNIFICANT CHANGE UP (ref 96–108)
CO2 SERPL-SCNC: 23 MMOL/L — SIGNIFICANT CHANGE UP (ref 22–31)
CREAT SERPL-MCNC: 0.95 MG/DL — SIGNIFICANT CHANGE UP (ref 0.5–1.3)
CULTURE RESULTS: SIGNIFICANT CHANGE UP
GLUCOSE SERPL-MCNC: 123 MG/DL — HIGH (ref 70–99)
HCT VFR BLD CALC: 33.3 % — LOW (ref 39–50)
HGB BLD-MCNC: 10.6 G/DL — LOW (ref 13–17)
MAGNESIUM SERPL-MCNC: 1.8 MG/DL — SIGNIFICANT CHANGE UP (ref 1.6–2.6)
MCHC RBC-ENTMCNC: 19.6 PG — LOW (ref 27–34)
MCHC RBC-ENTMCNC: 31.8 GM/DL — LOW (ref 32–36)
MCV RBC AUTO: 61.7 FL — LOW (ref 80–100)
METHOD TYPE: SIGNIFICANT CHANGE UP
NRBC # BLD: 0 /100 WBCS — SIGNIFICANT CHANGE UP (ref 0–0)
ORGANISM # SPEC MICROSCOPIC CNT: SIGNIFICANT CHANGE UP
ORGANISM # SPEC MICROSCOPIC CNT: SIGNIFICANT CHANGE UP
PHOSPHATE SERPL-MCNC: 2.6 MG/DL — SIGNIFICANT CHANGE UP (ref 2.5–4.5)
PLATELET # BLD AUTO: 150 K/UL — SIGNIFICANT CHANGE UP (ref 150–400)
POTASSIUM SERPL-MCNC: 3.9 MMOL/L — SIGNIFICANT CHANGE UP (ref 3.5–5.3)
POTASSIUM SERPL-SCNC: 3.9 MMOL/L — SIGNIFICANT CHANGE UP (ref 3.5–5.3)
RBC # BLD: 5.4 M/UL — SIGNIFICANT CHANGE UP (ref 4.2–5.8)
RBC # FLD: 16 % — HIGH (ref 10.3–14.5)
SODIUM SERPL-SCNC: 141 MMOL/L — SIGNIFICANT CHANGE UP (ref 135–145)
SPECIMEN SOURCE: SIGNIFICANT CHANGE UP
WBC # BLD: 10.1 K/UL — SIGNIFICANT CHANGE UP (ref 3.8–10.5)
WBC # FLD AUTO: 10.1 K/UL — SIGNIFICANT CHANGE UP (ref 3.8–10.5)

## 2022-02-06 PROCEDURE — 99233 SBSQ HOSP IP/OBS HIGH 50: CPT

## 2022-02-06 RX ORDER — CALCIUM CARBONATE 500(1250)
1 TABLET ORAL ONCE
Refills: 0 | Status: COMPLETED | OUTPATIENT
Start: 2022-02-06 | End: 2022-02-06

## 2022-02-06 RX ORDER — CEFTRIAXONE 500 MG/1
1000 INJECTION, POWDER, FOR SOLUTION INTRAMUSCULAR; INTRAVENOUS EVERY 24 HOURS
Refills: 0 | Status: COMPLETED | OUTPATIENT
Start: 2022-02-06 | End: 2022-02-07

## 2022-02-06 RX ADMIN — CEFTRIAXONE 100 MILLIGRAM(S): 500 INJECTION, POWDER, FOR SOLUTION INTRAMUSCULAR; INTRAVENOUS at 14:13

## 2022-02-06 RX ADMIN — HEPARIN SODIUM 5000 UNIT(S): 5000 INJECTION INTRAVENOUS; SUBCUTANEOUS at 05:54

## 2022-02-06 RX ADMIN — Medication 12.5 MILLIGRAM(S): at 17:44

## 2022-02-06 RX ADMIN — Medication 1 TABLET(S): at 23:37

## 2022-02-06 RX ADMIN — TAMSULOSIN HYDROCHLORIDE 0.4 MILLIGRAM(S): 0.4 CAPSULE ORAL at 23:01

## 2022-02-06 RX ADMIN — Medication 12.5 MILLIGRAM(S): at 05:54

## 2022-02-06 RX ADMIN — PIPERACILLIN AND TAZOBACTAM 25 GRAM(S): 4; .5 INJECTION, POWDER, LYOPHILIZED, FOR SOLUTION INTRAVENOUS at 06:37

## 2022-02-06 NOTE — PROGRESS NOTE ADULT - ATTENDING COMMENTS
1. E. Coli bacteremia, sepsis resolved  2. UTI and Prostatitis  3. s/p Rezum procedure recently  4. HTN  5. MS    - Afebrile, WBC 10 from 18, blood c/s grew E. Coli likely from recent urologic procedure. positive UA. BP improved    c/w IV Zosyn to IV ceftriaxone total 7 days and switch to PO Levaquin 500mg daily for 7 more days,, repeat blood c/s no growth  - Reviewed CT abd/pelvis no contrast- no abscess but evidence of cystitis and prostatitis     Urology f/u plan noted, d/c'd Palencia    - Resume low dose metoprolol 12.5mg bid, gradually put back his home BP meds  - DVT ppx .     PT rec no skilled PT needed .

## 2022-02-06 NOTE — PROGRESS NOTE ADULT - PROBLEM SELECTOR PLAN 4
On home HCTZ 25mg, losartan 100mg, metoprolol succinate 25mg  - c/w metoprolol tartrate 12.5mg BID, transition to home metoprolol succinate 25mg if tolerating  - resume home antihypertensives as needed

## 2022-02-06 NOTE — PROGRESS NOTE ADULT - PROBLEM SELECTOR PLAN 2
Patient w/ recent Rezum prostate procedure  Noted E. coli bacteremia, CTAP w/ prostatitis, cystitis  - F/u culture sensitivities  - Continue Zosyn (2/3- ), will consider extended course of PO abx to cover prostatitis  - Per urology, okay to dc louise; undergoing TOV  - Trend WBC, fever curve, vitals

## 2022-02-06 NOTE — PROGRESS NOTE ADULT - PROBLEM SELECTOR PLAN 8
DVT ppx: HSQ  Diet: CC/DASH diet  Dispo: Pending medical optimization, PT recommends home w/ no skilled PT needs

## 2022-02-06 NOTE — PROGRESS NOTE ADULT - SUBJECTIVE AND OBJECTIVE BOX
PROGRESS NOTE:   Authored by Dr. José Manuel Medrano MD    Patient is a 66y old  Male who presents with a chief complaint of Fever (05 Feb 2022 16:55)      SUBJECTIVE / OVERNIGHT EVENTS:  - Overnight, no acute events.  - Today, pt seen and examined at bedside in AM.    MEDICATIONS  (STANDING):  heparin   Injectable 5000 Unit(s) SubCutaneous every 8 hours  metoprolol tartrate 12.5 milliGRAM(s) Oral two times a day  piperacillin/tazobactam IVPB.. 3.375 Gram(s) IV Intermittent every 8 hours  tamsulosin 0.4 milliGRAM(s) Oral at bedtime    MEDICATIONS  (PRN):      CAPILLARY BLOOD GLUCOSE        I&O's Summary    05 Feb 2022 07:01  -  06 Feb 2022 07:00  --------------------------------------------------------  IN: 480 mL / OUT: 4100 mL / NET: -3620 mL        PHYSICAL EXAM:  Vital Signs Last 24 Hrs  T(C): 37.1 (06 Feb 2022 05:30), Max: 37.2 (05 Feb 2022 21:41)  T(F): 98.7 (06 Feb 2022 05:30), Max: 99 (05 Feb 2022 21:41)  HR: 84 (06 Feb 2022 05:30) (74 - 96)  BP: 138/81 (06 Feb 2022 05:30) (136/84 - 147/83)  BP(mean): --  RR: 18 (06 Feb 2022 05:30) (18 - 18)  SpO2: 97% (06 Feb 2022 05:30) (96% - 97%)    GENERAL: NAD, well-developed  HEENT: sclera clear  CHEST/LUNG: Clear to auscultation bilaterally; No wheezes or rales  HEART: Regular rate and rhythm; No murmurs, rubs, or gallops  ABDOMEN: Soft, Nontender, Nondistended; Bowel sounds present  EXTREMITIES:  2+ Peripheral Pulses, No clubbing, cyanosis, or edema  PSYCH: AAOx3  NEUROLOGY: non-focal  SKIN: No rashes or lesions    LABS:                        10.6   10.10 )-----------( 150      ( 06 Feb 2022 07:29 )             33.3     02-05    142  |  109<H>  |  21  ----------------------------<  115<H>  4.1   |  20<L>  |  0.97    Ca    8.9      05 Feb 2022 07:45  Phos  2.1     02-05  Mg     1.8     02-05                Culture - Urine (collected 03 Feb 2022 14:47)  Source: Clean Catch Clean Catch (Midstream)  Preliminary Report (05 Feb 2022 17:35):    >100,000 CFU/ml Escherichia coli    Culture - Blood (collected 03 Feb 2022 14:41)  Source: .Blood Blood-Peripheral  Gram Stain (04 Feb 2022 01:32):    Growth in aerobic bottle: Gram Negative Rods  Final Report (05 Feb 2022 16:12):    Growth in aerobic bottle: Escherichia coli    ***Blood Panel PCR results on this specimen are available    approximately 3 hours after the Gram stain result.***    Gram stain, PCR, and/or culture results may not always    correspond due to difference in methodologies.    ************************************************************    This PCR assay was performed by multiplex PCR. This    Assay tests for 66 bacterial and resistance gene targets.    Please refer to the [facility] test directory    at [email or web address] for details.  Organism: Blood Culture PCR  Escherichia coli (05 Feb 2022 16:12)  Organism: Escherichia coli (05 Feb 2022 16:12)  Organism: Blood Culture PCR (05 Feb 2022 16:12)    Culture - Blood (collected 03 Feb 2022 14:41)  Source: .Blood Blood-Peripheral  Preliminary Report (04 Feb 2022 15:02):    No growth to date.        RADIOLOGY & ADDITIONAL TESTS:  Results Reviewed:   Imaging Personally Reviewed:  Electrocardiogram Personally Reviewed:    COORDINATION OF CARE:  Care Discussed with Consultants/Other Providers [Y/N]:  Prior or Outpatient Records Reviewed [Y/N]:   PROGRESS NOTE:   Authored by Dr. José Manuel Medrano MD    Patient is a 66y old  Male who presents with a chief complaint of Fever (05 Feb 2022 16:55)      SUBJECTIVE / OVERNIGHT EVENTS:  - Overnight, no acute events.  - Today, pt seen and examined at bedside in AM.    MEDICATIONS  (STANDING):  heparin   Injectable 5000 Unit(s) SubCutaneous every 8 hours  metoprolol tartrate 12.5 milliGRAM(s) Oral two times a day  piperacillin/tazobactam IVPB.. 3.375 Gram(s) IV Intermittent every 8 hours  tamsulosin 0.4 milliGRAM(s) Oral at bedtime    MEDICATIONS  (PRN):      CAPILLARY BLOOD GLUCOSE        I&O's Summary    05 Feb 2022 07:01  -  06 Feb 2022 07:00  --------------------------------------------------------  IN: 480 mL / OUT: 4100 mL / NET: -3620 mL        PHYSICAL EXAM:  Vital Signs Last 24 Hrs  T(C): 37.1 (06 Feb 2022 05:30), Max: 37.2 (05 Feb 2022 21:41)  T(F): 98.7 (06 Feb 2022 05:30), Max: 99 (05 Feb 2022 21:41)  HR: 84 (06 Feb 2022 05:30) (74 - 96)  BP: 138/81 (06 Feb 2022 05:30) (136/84 - 147/83)  BP(mean): --  RR: 18 (06 Feb 2022 05:30) (18 - 18)  SpO2: 97% (06 Feb 2022 05:30) (96% - 97%)    GENERAL: NAD, well-developed  HEENT: sclera clear  CHEST/LUNG: Clear to auscultation bilaterally; No wheezes or rales  HEART: Regular rate and rhythm; No murmurs, rubs, or gallops  ABDOMEN: Soft, Nontender, Nondistended; Bowel sounds present  EXTREMITIES:  2+ Peripheral Pulses, No clubbing, cyanosis, or edema  PSYCH: AAOx3  NEUROLOGY: non-focal  SKIN: No rashes or lesions    LABS:                        10.6   10.10 )-----------( 150      ( 06 Feb 2022 07:29 )             33.3     02-05    142  |  109<H>  |  21  ----------------------------<  115<H>  4.1   |  20<L>  |  0.97    Ca    8.9      05 Feb 2022 07:45  Phos  2.1     02-05  Mg     1.8     02-05                Culture - Urine (collected 03 Feb 2022 14:47)  Source: Clean Catch Clean Catch (Midstream)  Preliminary Report (05 Feb 2022 17:35):    >100,000 CFU/ml Escherichia coli    Culture - Blood (collected 03 Feb 2022 14:41)  Source: .Blood Blood-Peripheral  Gram Stain (04 Feb 2022 01:32):    Growth in aerobic bottle: Gram Negative Rods  Final Report (05 Feb 2022 16:12):    Growth in aerobic bottle: Escherichia coli    ***Blood Panel PCR results on this specimen are available    approximately 3 hours after the Gram stain result.***    Gram stain, PCR, and/or culture results may not always    correspond due to difference in methodologies.    ************************************************************    This PCR assay was performed by multiplex PCR. This    Assay tests for 66 bacterial and resistance gene targets.    Please refer to the [facility] test directory    at [email or web address] for details.  Organism: Blood Culture PCR  Escherichia coli (05 Feb 2022 16:12)  Organism: Escherichia coli (05 Feb 2022 16:12)  Organism: Blood Culture PCR (05 Feb 2022 16:12)    Culture - Blood (collected 03 Feb 2022 14:41)  Source: .Blood Blood-Peripheral  Preliminary Report (04 Feb 2022 15:02):    No growth to date.        RADIOLOGY & ADDITIONAL TESTS:  no new imaging   PROGRESS NOTE:   Authored by Dr. José Manuel Medrano MD    Patient is a 66y old  Male who presents with a chief complaint of Fever (05 Feb 2022 16:55)      SUBJECTIVE / OVERNIGHT EVENTS:  - Overnight, no acute events.  - Today, pt seen and examined at bedside in AM. Pt reported feeling well today. Denied f/c/n/v, CP, SOB, abdominal pain. Palencia was removed today, undergoing TOV; states he is able to urinate but sometimes needs a little push (similar to before). Notes having some loose stools, but not watery.    MEDICATIONS  (STANDING):  heparin   Injectable 5000 Unit(s) SubCutaneous every 8 hours  metoprolol tartrate 12.5 milliGRAM(s) Oral two times a day  piperacillin/tazobactam IVPB.. 3.375 Gram(s) IV Intermittent every 8 hours  tamsulosin 0.4 milliGRAM(s) Oral at bedtime    MEDICATIONS  (PRN):      CAPILLARY BLOOD GLUCOSE        I&O's Summary    05 Feb 2022 07:01  -  06 Feb 2022 07:00  --------------------------------------------------------  IN: 480 mL / OUT: 4100 mL / NET: -3620 mL        PHYSICAL EXAM:  Vital Signs Last 24 Hrs  T(C): 37.1 (06 Feb 2022 05:30), Max: 37.2 (05 Feb 2022 21:41)  T(F): 98.7 (06 Feb 2022 05:30), Max: 99 (05 Feb 2022 21:41)  HR: 84 (06 Feb 2022 05:30) (74 - 96)  BP: 138/81 (06 Feb 2022 05:30) (136/84 - 147/83)  BP(mean): --  RR: 18 (06 Feb 2022 05:30) (18 - 18)  SpO2: 97% (06 Feb 2022 05:30) (96% - 97%)    GENERAL: NAD, well-developed  HEENT: sclera clear  CHEST/LUNG: Clear to auscultation bilaterally; No wheezes or rales  HEART: Regular rate and rhythm; No murmurs, rubs, or gallops  ABDOMEN: Soft, Nontender, Nondistended; Bowel sounds present  EXTREMITIES:  2+ Peripheral Pulses, No clubbing, cyanosis, or edema  PSYCH: AAOx3  NEUROLOGY: non-focal  SKIN: No rashes or lesions    LABS:                        10.6   10.10 )-----------( 150      ( 06 Feb 2022 07:29 )             33.3     02-05    142  |  109<H>  |  21  ----------------------------<  115<H>  4.1   |  20<L>  |  0.97    Ca    8.9      05 Feb 2022 07:45  Phos  2.1     02-05  Mg     1.8     02-05                Culture - Urine (collected 03 Feb 2022 14:47)  Source: Clean Catch Clean Catch (Midstream)  Preliminary Report (05 Feb 2022 17:35):    >100,000 CFU/ml Escherichia coli    Culture - Blood (collected 03 Feb 2022 14:41)  Source: .Blood Blood-Peripheral  Gram Stain (04 Feb 2022 01:32):    Growth in aerobic bottle: Gram Negative Rods  Final Report (05 Feb 2022 16:12):    Growth in aerobic bottle: Escherichia coli    ***Blood Panel PCR results on this specimen are available    approximately 3 hours after the Gram stain result.***    Gram stain, PCR, and/or culture results may not always    correspond due to difference in methodologies.    ************************************************************    This PCR assay was performed by multiplex PCR. This    Assay tests for 66 bacterial and resistance gene targets.    Please refer to the [facility] Labs test directory    at [email or web address] for details.  Organism: Blood Culture PCR  Escherichia coli (05 Feb 2022 16:12)  Organism: Escherichia coli (05 Feb 2022 16:12)  Organism: Blood Culture PCR (05 Feb 2022 16:12)    Culture - Blood (collected 03 Feb 2022 14:41)  Source: .Blood Blood-Peripheral  Preliminary Report (04 Feb 2022 15:02):    No growth to date.        RADIOLOGY & ADDITIONAL TESTS:  no new imaging

## 2022-02-06 NOTE — PROGRESS NOTE ADULT - PROBLEM SELECTOR PLAN 1
Improving  Patient meets criteria for severe sepsis w/ HR>90, WBC>12, T>38 at home, hypotension, elevated lactate, bandemia  Blood cultures with E. coli  CTAP w/ evidence of prostatitis, cystitis  - F/u blood culture sensitivities  - Urine cultures sent, f/u results  - Continue Zosyn (2/3-)  - Monitor WBC, trend fever curve, vitals  - Management of UTI as per below Improving  Patient meets criteria for severe sepsis w/ HR>90, WBC>12, T>38 at home, hypotension, elevated lactate, bandemia  Blood cultures with E. coli  CTAP w/ evidence of prostatitis, cystitis  - F/u blood culture sensitivities  - Urine cultures sent, f/u results  - s/p Zosyn (2/3-2/6), c/w CTX (2/6- ) while inpatient (plan for 7 days IV abx, then dc on 7 days of PO fluoroquinolone and f/u with outpatient urology if further extended abx therapy needed for prostatitis)  - Monitor WBC, trend fever curve, vitals  - Management of UTI as per below

## 2022-02-07 DIAGNOSIS — D35.00 BENIGN NEOPLASM OF UNSPECIFIED ADRENAL GLAND: ICD-10-CM

## 2022-02-07 LAB
ANION GAP SERPL CALC-SCNC: 11 MMOL/L — SIGNIFICANT CHANGE UP (ref 5–17)
BUN SERPL-MCNC: 18 MG/DL — SIGNIFICANT CHANGE UP (ref 7–23)
CALCIUM SERPL-MCNC: 9.7 MG/DL — SIGNIFICANT CHANGE UP (ref 8.4–10.5)
CHLORIDE SERPL-SCNC: 102 MMOL/L — SIGNIFICANT CHANGE UP (ref 96–108)
CO2 SERPL-SCNC: 24 MMOL/L — SIGNIFICANT CHANGE UP (ref 22–31)
CREAT SERPL-MCNC: 0.94 MG/DL — SIGNIFICANT CHANGE UP (ref 0.5–1.3)
GLUCOSE SERPL-MCNC: 118 MG/DL — HIGH (ref 70–99)
HCT VFR BLD CALC: 35.8 % — LOW (ref 39–50)
HGB BLD-MCNC: 11.5 G/DL — LOW (ref 13–17)
MAGNESIUM SERPL-MCNC: 1.9 MG/DL — SIGNIFICANT CHANGE UP (ref 1.6–2.6)
MCHC RBC-ENTMCNC: 19.8 PG — LOW (ref 27–34)
MCHC RBC-ENTMCNC: 32.1 GM/DL — SIGNIFICANT CHANGE UP (ref 32–36)
MCV RBC AUTO: 61.5 FL — LOW (ref 80–100)
NRBC # BLD: 0 /100 WBCS — SIGNIFICANT CHANGE UP (ref 0–0)
PHOSPHATE SERPL-MCNC: 3.3 MG/DL — SIGNIFICANT CHANGE UP (ref 2.5–4.5)
PLATELET # BLD AUTO: 168 K/UL — SIGNIFICANT CHANGE UP (ref 150–400)
POTASSIUM SERPL-MCNC: 4.3 MMOL/L — SIGNIFICANT CHANGE UP (ref 3.5–5.3)
POTASSIUM SERPL-SCNC: 4.3 MMOL/L — SIGNIFICANT CHANGE UP (ref 3.5–5.3)
RBC # BLD: 5.82 M/UL — HIGH (ref 4.2–5.8)
RBC # FLD: 16.6 % — HIGH (ref 10.3–14.5)
SODIUM SERPL-SCNC: 137 MMOL/L — SIGNIFICANT CHANGE UP (ref 135–145)
WBC # BLD: 9.1 K/UL — SIGNIFICANT CHANGE UP (ref 3.8–10.5)
WBC # FLD AUTO: 9.1 K/UL — SIGNIFICANT CHANGE UP (ref 3.8–10.5)

## 2022-02-07 PROCEDURE — 99232 SBSQ HOSP IP/OBS MODERATE 35: CPT | Mod: GC

## 2022-02-07 PROCEDURE — 99222 1ST HOSP IP/OBS MODERATE 55: CPT

## 2022-02-07 RX ORDER — TAMSULOSIN HYDROCHLORIDE 0.4 MG/1
0.8 CAPSULE ORAL AT BEDTIME
Refills: 0 | Status: DISCONTINUED | OUTPATIENT
Start: 2022-02-07 | End: 2022-02-08

## 2022-02-07 RX ORDER — PHENAZOPYRIDINE HCL 100 MG
200 TABLET ORAL EVERY 8 HOURS
Refills: 0 | Status: DISCONTINUED | OUTPATIENT
Start: 2022-02-07 | End: 2022-02-08

## 2022-02-07 RX ADMIN — Medication 200 MILLIGRAM(S): at 17:31

## 2022-02-07 RX ADMIN — TAMSULOSIN HYDROCHLORIDE 0.8 MILLIGRAM(S): 0.4 CAPSULE ORAL at 22:09

## 2022-02-07 RX ADMIN — Medication 12.5 MILLIGRAM(S): at 18:22

## 2022-02-07 RX ADMIN — Medication 12.5 MILLIGRAM(S): at 05:35

## 2022-02-07 RX ADMIN — CEFTRIAXONE 100 MILLIGRAM(S): 500 INJECTION, POWDER, FOR SOLUTION INTRAMUSCULAR; INTRAVENOUS at 14:56

## 2022-02-07 NOTE — PROGRESS NOTE ADULT - ATTENDING COMMENTS
66M w/pmh multiple sclerosis (not on meds), pre-DM, HTN, BPH who presents 9 days after Rezum prostate procedure w/sepsis 2/2 E coli cystitis, prostatitis, bacteremia. Getting IV ceftriaxone.    -CT imaging mentioned inflammation around prostate that could be 2/2 his procedure, but it could also be infectious prostatitis  -consult ID for abx duration  -TOV - louise removed, patient having some dysuria still, and urinary hesitancy. Increase flomax, trial phenazopyridine.    Discharge home in next 1-2 days.

## 2022-02-07 NOTE — CONSULT NOTE ADULT - ASSESSMENT
66M with MS, pre-DM, HTN, BPH who presents 9 days following Rezum prostate procedure with fevers, chills, hypotension. He has a history of BPH that has previously been managed with tamsulosin. He underwent Rezum procedure with Dr. Awad on 1/25 and was d/c'ed home without complications. No pre/post procedure antibiotics.  He thinks he received a dose of IV antibiotics.  Palencia removed 3 days PTA (admitted 2/3) with fever day PTA and associated rigors with hypotension.   Here Tm 100.3 WBC 18K.  BC and UC with E coli (pan-S).  No prostate tenderness on rectal exam (per patient).  Antibiotics narrowed from zosyn to ceftriaxone. Today is D#5      Sepsis secondary to  procedure  - no definite prostatitis  - okay to transition to oral levaquin 500mg daily  - to complete 14 days total antibiotics (today is D#5)    Leukocytosis/fever  - both are improved    I have discussed plan of care as detailed above with primary team    Please call Infectious Diseases if there is a change in status.  Thank you.  [phone].
65 y/o male s/p rezume procedure with outside urologist JOSE Awad MD ~10 days ago, louise removed 3 days ago presenting with signs of urosepsis      -aggressive resuscitation, consider ICU evaluation  -cont iv abx  -louise catheter placement (to be done by urology)  -follow up cultures  discussed with urology attending, JOSE Dee MD
66M with urosepsis after Rezum  - follow up cultures  - continue broad spectrum abx  - trend wbc, cr  - hydration  - maintain louise, would send home with it and outpatient TOV with his urologist Dr. Ritesh Dee MD  [facility] [address] [address] [phone]

## 2022-02-07 NOTE — PROGRESS NOTE ADULT - PROBLEM SELECTOR PLAN 1
criteria for severe sepsis w/ HR>90, WBC>12, T>38 at home, hypotension, elevated lactate, bandemia  BCx w/ E. coli  CTAP w/ evidence of prostatitis, cystitis  - 02/03 U/A pos., f/u UCx  - s/p Zosyn (2/3-2/6), c/w CTX 1g qd (2/6- ) while inpatient (plan for 7 days IV abx, then dc on 7 days of PO fluoroquinolone and f/u with outpatient urology if further extended abx therapy needed for prostatitis)  - Monitor WBC, trend fever curve, vitals  - Management of UTI as per below criteria for severe sepsis w/ HR>90, WBC>12, T>38 at home, hypotension, elevated lactate, bandemia  BCx w/ E. coli  CTAP w/ evidence of prostatitis, cystitis  - 02/03 U/A pos., and UCx w/ E. coli  - s/p Zosyn (2/3-2/6), c/w CTX 1g qd (2/6- ) while inpatient  - f/u ID for Abx duration  - plan for f/u with outpatient urology if further extended abx therapy needed for prostatitis  - Monitor WBC, trend fever curve, vital

## 2022-02-07 NOTE — PROGRESS NOTE ADULT - PROBLEM SELECTOR PLAN 6
Not currently on medications  - Monitor for symptoms  - Per PT, no skilled PT needs Not currently on medications  - CTM for symptoms  - Per PT, no skilled PT needs

## 2022-02-07 NOTE — PROGRESS NOTE ADULT - PROBLEM SELECTOR PLAN 8
DVT ppx: SQH  Diet: CC/DASH diet  Dispo: PT recommends home w/ no skilled PT needs LEFT Adrenal adenoma, incidental finding  - 02/03 CT-A/P - w/ 4.6 x 3.7 cm left adrenal nodule with Hounsfield units less than 10, compatible with an adenoma. Right adrenal gland normal.  - no acute interventions indicated at this time, f/u outpt for further eval

## 2022-02-07 NOTE — CONSULT NOTE ADULT - SUBJECTIVE AND OBJECTIVE BOX
Patient is a 66y old  Male who presents with a chief complaint of Fever (07 Feb 2022 07:44)    HPI:  66M with MS, pre-DM, HTN, BPH who presents 9 days following Rezum prostate procedure with fevers, chills, hypotension. He has a history of BPH that has previously been managed with tamsulosin. He underwent Rezum procedure with Dr. Awad on 1/25 and was d/c'ed home without complications. No pre/post procedure antibiotics.  He thinks he received a dose of IV antibiotics.  Louise removed 3 days PTA (admitted 2/3) with fever day PTA and associated rigors with hypotension.   Here Tm 100.3 WBC 18K.  BC and UC with E coli (pan-S).  No prostate tenderness on rectal exam (per patient).  Antibiotics narrowed from zosyn to ceftriaxone. Today is D#5    ID asked to help management.    prior hospital charts reviewed [  ]  primary team notes reviewed [ x ]  other consultant notes reviewed [ x ]    PAST MEDICAL & SURGICAL HISTORY:  HTN (hypertension)  HLD (hyperlipidemia)  BPH (benign prostatic hyperplasia)    Allergies  No Known Allergies    ANTIMICROBIALS:  Piperacillin/tazobactam IVPB (2/3-2/6)  vancomycin  IVPB (2/3 x1)    active:  cefTRIAXone   IVPB 1000 every 24 hours (2/3, 2/6-)    MEDICATIONS  (STANDING):  metoprolol tartrate 12.5 two times a day  phenazopyridine 200 every 8 hours  tamsulosin 0.8 at bedtime    SOCIAL HISTORY:  former smoker, occastional etoh      FAMILY HISTORY:  father had BPH    REVIEW OF SYSTEMS  [  ] ROS unobtainable because:    [ x ] All other systems negative except as noted below:	    Constitutional:  [ x] fever [ ] chills  [ ] weight loss  [ ] weakness  Skin:  [ ] rash [ ] phlebitis	  Eyes: [ ] icterus [ ] pain  [ ] discharge	  ENMT: [ ] sore throat  [ ] thrush [ ] ulcers [ ] exudates  Respiratory: [ ] dyspnea [ ] hemoptysis [ ] cough [ ] sputum	  Cardiovascular:  [ ] chest pain [ ] palpitations [ ] edema	  Gastrointestinal:  [ ] nausea [ ] vomiting [ ] diarrhea [ ] constipation [ ] pain	  Genitourinary:  [ x] dysuria [ ] frequency [ ] hematuria [ ] discharge [ ] flank pain  [ ] incontinence  Musculoskeletal:  [ ] myalgias [ ] arthralgias [ ] arthritis  [ ] back pain  Neurological:  [ ] headache [ ] seizures  [ ] confusion/altered mental status  Psychiatric:  [ ] anxiety [ ] depression	  Hematology/Lymphatics:  [ ] lymphadenopathy  Endocrine:  [ ] adrenal [ ] thyroid  Allergic/Immunologic:	 [ ] transplant [ ] seasonal    Vital Signs Last 24 Hrs  T(F): 98.7 (02-07-22 @ 11:33), Max: 100.2 (02-03-22 @ 10:14)  Vital Signs Last 24 Hrs  HR: 90 (02-07-22 @ 11:33) (74 - 90)  BP: 123/79 (02-07-22 @ 11:33) (123/79 - 136/79)  RR: 18 (02-07-22 @ 11:33)  SpO2: 95% (02-07-22 @ 11:33) (95% - 98%)  Wt(kg): --    PHYSICAL EXAM:  Constitutional: non-toxic  HEAD/EYES: anicteric  ENT:  supple  Cardiovascular:   normal S1, S2,  Respiratory:  clear BS bilaterally  GI:  soft, non-tender, normal bowel sounds  :  no louise, no CVA tenderness  Musculoskeletal:  no synovitis  Neurologic: awake and alert, normal strength, no focal findings  Skin:  no rash  Psychiatric:  awake, alert, appropriate mood                       11.5   9.10  )-----------( 168      ( 07 Feb 2022 06:22 )             35.8     137  |  102  |  18  ----------------------------<  118<H>  4.3   |  24  |  0.94    Ca    9.7      07 Feb 2022 06:23  Phos  3.3     02-07  Mg     1.9     02-07    WBC Count: 9.10 (02-07-22 @ 06:22)  WBC Count: 10.10 (02-06-22 @ 07:29)  WBC Count: 11.60 (02-05-22 @ 07:53)  WBC Count: 15.85 (02-04-22 @ 07:06)  WBC Count: 18.88 (02-03-22 @ 10:20)    MICROBIOLOGY:  Culture - Blood (collected 05 Feb 2022 12:04)  Source: .Blood Blood-Peripheral  Preliminary Report (06 Feb 2022 13:01):    No growth to date.    Culture - Blood (collected 05 Feb 2022 12:04)  Source: .Blood Blood-Peripheral  Preliminary Report (06 Feb 2022 13:01):    No growth to date.    Culture - Urine (collected 03 Feb 2022 14:47)  Source: Clean Catch Clean Catch (Midstream)  Final Report (06 Feb 2022 16:22):    >100,000 CFU/ml Escherichia coli  Organism: Escherichia coli (06 Feb 2022 16:22)  Organism: Escherichia coli (06 Feb 2022 16:22)      -  Amikacin: S <=16      -  Amoxicillin/Clavulanic Acid: S <=8/4      -  Ampicillin: S <=8 These ampicillin results predict results for amoxicillin      -  Ampicillin/Sulbactam: S <=4/2 Enterobacter, Klebsiella aerogenes, Citrobacter, and Serratia may develop resistance during prolonged therapy (3-4 days)      -  Aztreonam: S <=4      -  Cefazolin: S <=2 (MIC_CL_COM_ENTERIC_CEFAZU) For uncomplicated UTI with K. pneumoniae, E. coli, or P. mirablis: MAHESH <=16 is sensitive and MHAESH >=32 is resistant. This also predicts results for oral agents cefaclor, cefdinir, cefpodoxime, cefprozil, cefuroxime axetil, cephalexin and locarbef for uncomplicated UTI. Note that some isolates may be susceptible to these agents while testing resistant to cefazolin.      -  Cefepime: S <=2      -  Cefoxitin: S <=8      -  Ceftriaxone: S <=1 Enterobacter, Klebsiella aerogenes, Citrobacter, and Serratia may develop resistance during prolonged therapy      -  Ciprofloxacin: S <=0.25      -  Ertapenem: S <=0.5      -  Gentamicin: S <=2      -  Imipenem: S <=1      -  Levofloxacin: S <=0.5      -  Meropenem: S <=1      -  Nitrofurantoin: S <=32 Should not be used to treat pyelonephritis      -  Piperacillin/Tazobactam: S <=8      -  Tigecycline: S <=2      -  Tobramycin: S <=2      -  Trimethoprim/Sulfamethoxazole: S <=0.5/9.5      Method Type: MAHESH    Culture - Blood (collected 03 Feb 2022 14:41)  Source: .Blood Blood-Peripheral  Gram Stain (04 Feb 2022 01:32):    Growth in aerobic bottle: Gram Negative Rods  Final Report (05 Feb 2022 16:12):    Growth in aerobic bottle: Escherichia coli    ***Blood Panel PCR results on this specimen are available    approximately 3 hours after the Gram stain result.***    Gram stain, PCR, and/or culture results may not always    correspond due to difference in methodologies.    ************************************************************    This PCR assay was performed by multiplex PCR. This    Assay tests for 66 bacterial and resistance gene targets.    Please refer to the [facility] test directory    at [email or web address] for details.  Organism: Blood Culture PCR  Escherichia coli (05 Feb 2022 16:12)  Organism: Escherichia coli (05 Feb 2022 16:12)      -  Amikacin: S <=16      -  Ampicillin: S <=8 These ampicillin results predict results for amoxicillin      -  Ampicillin/Sulbactam: S <=4/2 Enterobacter, Klebsiella aerogenes, Citrobacter, and Serratia may develop resistance during prolonged therapy (3-4 days)      -  Aztreonam: S <=4      -  Cefazolin: S <=2 Enterobacter, Klebsiella aerogenes, Citrobacter, and Serratia may develop resistance during prolonged therapy (3-4 days)      -  Cefepime: S <=2      -  Cefoxitin: S <=8      -  Ceftriaxone: S <=1 Enterobacter, Klebsiella aerogenes, Citrobacter, and Serratia may develop resistance during prolonged therapy      -  Ciprofloxacin: S <=0.25      -  Ertapenem: S <=0.5      -  Gentamicin: S <=2      -  Imipenem: S <=1      -  Levofloxacin: S <=0.5      -  Meropenem: S <=1      -  Piperacillin/Tazobactam: S <=8      -  Tobramycin: S <=2      -  Trimethoprim/Sulfamethoxazole: S <=0.5/9.5      Method Type: MAHESH  Organism: Blood Culture PCR (05 Feb 2022 16:12)      -  Escherichia coli: Detec      Method Type: PCR    Culture - Blood (collected 03 Feb 2022 14:41)  Source: .Blood Blood-Peripheral  Preliminary Report (04 Feb 2022 15:02):    No growth to date.    RADIOLOGY:  imaging below personally reviewed and agree with findings    CT Abdomen and Pelvis No Cont (02.03.22 @ 16:48) >  IMPRESSION:  Bladder wall thickening with perivesicular inflammatory changes, suggestive of cystitis.  Enlarged prostate. Mild periprostatic inflammatory changes are suggestive of prostatitis, potentially post procedural or infectious in etiology.   Findings of proctitis, potentially reactive.  Left adrenal adenoma measuring 4.6 cm.

## 2022-02-07 NOTE — PROGRESS NOTE ADULT - SUBJECTIVE AND OBJECTIVE BOX
PROGRESS NOTE:   Authored By: Hugh Krueger M.D. (PGY-1)  STEPHANIE Team 03  Available on TEAMS / Pager: [phone]    ***IN PROGRESS***    Patient is a 66y old  Male who presents with a chief complaint of Fever (06 Feb 2022 08:06)      OVERNIGHT EVENTS: No acute events overnight    SUBJECTIVE: Pt seen and examined at bedside this morning.     ADDITIONAL REVIEW OF SYSTEMS:    MEDICATIONS  (STANDING):  cefTRIAXone   IVPB 1000 milliGRAM(s) IV Intermittent every 24 hours  metoprolol tartrate 12.5 milliGRAM(s) Oral two times a day  tamsulosin 0.4 milliGRAM(s) Oral at bedtime    MEDICATIONS  (PRN):    CAPILLARY BLOOD GLUCOSE        I&O's Summary    06 Feb 2022 07:01  -  07 Feb 2022 07:00  --------------------------------------------------------  IN: 840 mL / OUT: 2700 mL / NET: -1860 mL        PHYSICAL EXAM:  Vital Signs Last 24 Hrs  T(C): 36.7 (07 Feb 2022 04:38), Max: 37 (06 Feb 2022 13:11)  T(F): 98 (07 Feb 2022 04:38), Max: 98.6 (06 Feb 2022 13:11)  HR: 74 (07 Feb 2022 04:38) (74 - 77)  BP: 129/78 (07 Feb 2022 04:38) (129/78 - 136/79)  BP(mean): --  RR: 18 (07 Feb 2022 04:38) (18 - 18)  SpO2: 98% (07 Feb 2022 04:38) (97% - 98%)    GENERAL: NAD, well-developed  HEENT: sclera clear  CHEST/LUNG: Clear to auscultation bilaterally; No wheezes or rales  HEART: Regular rate and rhythm; No murmurs, rubs, or gallops  ABDOMEN: Soft, Nontender, Nondistended; Bowel sounds present  EXTREMITIES:  2+ Peripheral Pulses, No clubbing, cyanosis, or edema  PSYCH: AAOx3  NEUROLOGY: non-focal  SKIN: No rashes or lesions      LABS:                        11.5   9.10  )-----------( 168      ( 07 Feb 2022 06:22 )             35.8     02-07    137  |  102  |  18  ----------------------------<  118<H>  4.3   |  24  |  0.94    Ca    9.7      07 Feb 2022 06:23  Phos  3.3     02-07  Mg     1.9     02-07                Culture - Blood (collected 05 Feb 2022 12:04)  Source: .Blood Blood-Peripheral  Preliminary Report (06 Feb 2022 13:01):    No growth to date.    Culture - Blood (collected 05 Feb 2022 12:04)  Source: .Blood Blood-Peripheral  Preliminary Report (06 Feb 2022 13:01):    No growth to date.        RADIOLOGY & ADDITIONAL TESTS:    Results Reviewed:   Imaging Personally Reviewed:  Electrocardiogram Personally Reviewed:    COORDINATION OF CARE:  Care Discussed with Consultants/Other Providers [Y/N]:  Prior or Outpatient Records Reviewed [Y/N]:   PROGRESS NOTE:   Authored By: Hugh Krueger M.D. (PGY-1)  NS Team 03  Available on TEAMS    Patient is a 66y old  Male who presents with a chief complaint of Fever (06 Feb 2022 08:06)    OVERNIGHT EVENTS: Palencia d/c'ed yesterday and voided 2.7L in last 24hrs.  SUBJECTIVE: Pt seen and examined at bedside this morning. Reports dysuria, worse overnight, but denies hematuria. Requesting warm compress to suprapubic area. Otherwise patient reports improvement.     ADDITIONAL REVIEW OF SYSTEMS:    MEDICATIONS  (STANDING):  cefTRIAXone   IVPB 1000 milliGRAM(s) IV Intermittent every 24 hours  metoprolol tartrate 12.5 milliGRAM(s) Oral two times a day  tamsulosin 0.4 milliGRAM(s) Oral at bedtime    MEDICATIONS  (PRN):    CAPILLARY BLOOD GLUCOSE        I&O's Summary    06 Feb 2022 07:01  -  07 Feb 2022 07:00  --------------------------------------------------------  IN: 840 mL / OUT: 2700 mL / NET: -1860 mL        PHYSICAL EXAM:  Vital Signs Last 24 Hrs  T(C): 36.7 (07 Feb 2022 04:38), Max: 37 (06 Feb 2022 13:11)  T(F): 98 (07 Feb 2022 04:38), Max: 98.6 (06 Feb 2022 13:11)  HR: 74 (07 Feb 2022 04:38) (74 - 77)  BP: 129/78 (07 Feb 2022 04:38) (129/78 - 136/79)  BP(mean): --  RR: 18 (07 Feb 2022 04:38) (18 - 18)  SpO2: 98% (07 Feb 2022 04:38) (97% - 98%)    GENERAL: NAD, well-developed  HEENT: sclera clear  CHEST/LUNG: Clear to auscultation bilaterally; No wheezes or rales  HEART: Regular rate and rhythm; No murmurs, rubs, or gallops  ABDOMEN: Soft, Nontender, Nondistended; Bowel sounds present  EXTREMITIES:  2+ Peripheral Pulses, No clubbing, cyanosis, or edema  PSYCH: AAOx3  NEUROLOGY: non-focal  SKIN: No rashes or lesions      LABS:                        11.5   9.10  )-----------( 168      ( 07 Feb 2022 06:22 )             35.8     02-07    137  |  102  |  18  ----------------------------<  118<H>  4.3   |  24  |  0.94    Ca    9.7      07 Feb 2022 06:23  Phos  3.3     02-07  Mg     1.9     02-07      Culture - Blood (collected 05 Feb 2022 12:04)  Source: .Blood Blood-Peripheral  Preliminary Report (06 Feb 2022 13:01):    No growth to date.    Culture - Blood (collected 05 Feb 2022 12:04)  Source: .Blood Blood-Peripheral  Preliminary Report (06 Feb 2022 13:01):    No growth to date.        RADIOLOGY & ADDITIONAL TESTS:    Results Reviewed:   Imaging Personally Reviewed:  Electrocardiogram Personally Reviewed:    COORDINATION OF CARE:  Care Discussed with Consultants/Other Providers [Y/N]:  Prior or Outpatient Records Reviewed [Y/N]:

## 2022-02-08 ENCOUNTER — TRANSCRIPTION ENCOUNTER (OUTPATIENT)
Age: 67
End: 2022-02-08

## 2022-02-08 VITALS
DIASTOLIC BLOOD PRESSURE: 82 MMHG | HEART RATE: 98 BPM | SYSTOLIC BLOOD PRESSURE: 116 MMHG | OXYGEN SATURATION: 96 % | TEMPERATURE: 98 F | RESPIRATION RATE: 16 BRPM

## 2022-02-08 LAB
ANION GAP SERPL CALC-SCNC: 11 MMOL/L — SIGNIFICANT CHANGE UP (ref 5–17)
BASOPHILS # BLD AUTO: 0.09 K/UL — SIGNIFICANT CHANGE UP (ref 0–0.2)
BASOPHILS NFR BLD AUTO: 0.8 % — SIGNIFICANT CHANGE UP (ref 0–2)
BUN SERPL-MCNC: 19 MG/DL — SIGNIFICANT CHANGE UP (ref 7–23)
CALCIUM SERPL-MCNC: 9.4 MG/DL — SIGNIFICANT CHANGE UP (ref 8.4–10.5)
CHLORIDE SERPL-SCNC: 100 MMOL/L — SIGNIFICANT CHANGE UP (ref 96–108)
CO2 SERPL-SCNC: 24 MMOL/L — SIGNIFICANT CHANGE UP (ref 22–31)
CREAT SERPL-MCNC: 0.94 MG/DL — SIGNIFICANT CHANGE UP (ref 0.5–1.3)
CULTURE RESULTS: SIGNIFICANT CHANGE UP
EOSINOPHIL # BLD AUTO: 0.19 K/UL — SIGNIFICANT CHANGE UP (ref 0–0.5)
EOSINOPHIL NFR BLD AUTO: 1.6 % — SIGNIFICANT CHANGE UP (ref 0–6)
GLUCOSE SERPL-MCNC: 108 MG/DL — HIGH (ref 70–99)
HCT VFR BLD CALC: 38.3 % — LOW (ref 39–50)
HGB BLD-MCNC: 11.9 G/DL — LOW (ref 13–17)
IMM GRANULOCYTES NFR BLD AUTO: 3.2 % — HIGH (ref 0–1.5)
LYMPHOCYTES # BLD AUTO: 18.7 % — SIGNIFICANT CHANGE UP (ref 13–44)
LYMPHOCYTES # BLD AUTO: 2.2 K/UL — SIGNIFICANT CHANGE UP (ref 1–3.3)
MAGNESIUM SERPL-MCNC: 2.1 MG/DL — SIGNIFICANT CHANGE UP (ref 1.6–2.6)
MCHC RBC-ENTMCNC: 19.4 PG — LOW (ref 27–34)
MCHC RBC-ENTMCNC: 31.1 GM/DL — LOW (ref 32–36)
MCV RBC AUTO: 62.4 FL — LOW (ref 80–100)
MONOCYTES # BLD AUTO: 1.36 K/UL — HIGH (ref 0–0.9)
MONOCYTES NFR BLD AUTO: 11.6 % — SIGNIFICANT CHANGE UP (ref 2–14)
NEUTROPHILS # BLD AUTO: 7.54 K/UL — HIGH (ref 1.8–7.4)
NEUTROPHILS NFR BLD AUTO: 64.1 % — SIGNIFICANT CHANGE UP (ref 43–77)
NRBC # BLD: 0 /100 WBCS — SIGNIFICANT CHANGE UP (ref 0–0)
PHOSPHATE SERPL-MCNC: 3.2 MG/DL — SIGNIFICANT CHANGE UP (ref 2.5–4.5)
PLATELET # BLD AUTO: 202 K/UL — SIGNIFICANT CHANGE UP (ref 150–400)
POTASSIUM SERPL-MCNC: 4.3 MMOL/L — SIGNIFICANT CHANGE UP (ref 3.5–5.3)
POTASSIUM SERPL-SCNC: 4.3 MMOL/L — SIGNIFICANT CHANGE UP (ref 3.5–5.3)
RBC # BLD: 6.14 M/UL — HIGH (ref 4.2–5.8)
RBC # FLD: 17.2 % — HIGH (ref 10.3–14.5)
SODIUM SERPL-SCNC: 135 MMOL/L — SIGNIFICANT CHANGE UP (ref 135–145)
SPECIMEN SOURCE: SIGNIFICANT CHANGE UP
WBC # BLD: 11.76 K/UL — HIGH (ref 3.8–10.5)
WBC # FLD AUTO: 11.76 K/UL — HIGH (ref 3.8–10.5)

## 2022-02-08 PROCEDURE — 81001 URINALYSIS AUTO W/SCOPE: CPT

## 2022-02-08 PROCEDURE — 85018 HEMOGLOBIN: CPT

## 2022-02-08 PROCEDURE — 86850 RBC ANTIBODY SCREEN: CPT

## 2022-02-08 PROCEDURE — 83735 ASSAY OF MAGNESIUM: CPT

## 2022-02-08 PROCEDURE — 83540 ASSAY OF IRON: CPT

## 2022-02-08 PROCEDURE — 87150 DNA/RNA AMPLIFIED PROBE: CPT

## 2022-02-08 PROCEDURE — 74176 CT ABD & PELVIS W/O CONTRAST: CPT

## 2022-02-08 PROCEDURE — 85014 HEMATOCRIT: CPT

## 2022-02-08 PROCEDURE — 82330 ASSAY OF CALCIUM: CPT

## 2022-02-08 PROCEDURE — 87637 SARSCOV2&INF A&B&RSV AMP PRB: CPT

## 2022-02-08 PROCEDURE — 86900 BLOOD TYPING SEROLOGIC ABO: CPT

## 2022-02-08 PROCEDURE — 99239 HOSP IP/OBS DSCHRG MGMT >30: CPT | Mod: GC

## 2022-02-08 PROCEDURE — 85027 COMPLETE CBC AUTOMATED: CPT

## 2022-02-08 PROCEDURE — 87086 URINE CULTURE/COLONY COUNT: CPT

## 2022-02-08 PROCEDURE — 84132 ASSAY OF SERUM POTASSIUM: CPT

## 2022-02-08 PROCEDURE — 86901 BLOOD TYPING SEROLOGIC RH(D): CPT

## 2022-02-08 PROCEDURE — 84295 ASSAY OF SERUM SODIUM: CPT

## 2022-02-08 PROCEDURE — 99285 EMERGENCY DEPT VISIT HI MDM: CPT | Mod: 25

## 2022-02-08 PROCEDURE — 85025 COMPLETE CBC W/AUTO DIFF WBC: CPT

## 2022-02-08 PROCEDURE — 99232 SBSQ HOSP IP/OBS MODERATE 35: CPT

## 2022-02-08 PROCEDURE — 87040 BLOOD CULTURE FOR BACTERIA: CPT

## 2022-02-08 PROCEDURE — 82565 ASSAY OF CREATININE: CPT

## 2022-02-08 PROCEDURE — 87186 SC STD MICRODIL/AGAR DIL: CPT

## 2022-02-08 PROCEDURE — 80053 COMPREHEN METABOLIC PANEL: CPT

## 2022-02-08 PROCEDURE — 84100 ASSAY OF PHOSPHORUS: CPT

## 2022-02-08 PROCEDURE — 83605 ASSAY OF LACTIC ACID: CPT

## 2022-02-08 PROCEDURE — 82947 ASSAY GLUCOSE BLOOD QUANT: CPT

## 2022-02-08 PROCEDURE — 36415 COLL VENOUS BLD VENIPUNCTURE: CPT

## 2022-02-08 PROCEDURE — 86803 HEPATITIS C AB TEST: CPT

## 2022-02-08 PROCEDURE — 80048 BASIC METABOLIC PNL TOTAL CA: CPT

## 2022-02-08 PROCEDURE — 97161 PT EVAL LOW COMPLEX 20 MIN: CPT

## 2022-02-08 PROCEDURE — 85730 THROMBOPLASTIN TIME PARTIAL: CPT

## 2022-02-08 PROCEDURE — 82803 BLOOD GASES ANY COMBINATION: CPT

## 2022-02-08 PROCEDURE — 83550 IRON BINDING TEST: CPT

## 2022-02-08 PROCEDURE — 96374 THER/PROPH/DIAG INJ IV PUSH: CPT

## 2022-02-08 PROCEDURE — 84466 ASSAY OF TRANSFERRIN: CPT

## 2022-02-08 PROCEDURE — 93005 ELECTROCARDIOGRAM TRACING: CPT

## 2022-02-08 PROCEDURE — 82728 ASSAY OF FERRITIN: CPT

## 2022-02-08 PROCEDURE — 85610 PROTHROMBIN TIME: CPT

## 2022-02-08 PROCEDURE — 82435 ASSAY OF BLOOD CHLORIDE: CPT

## 2022-02-08 RX ORDER — LEVOFLOXACIN 5 MG/ML
1 INJECTION, SOLUTION INTRAVENOUS
Qty: 8 | Refills: 0
Start: 2022-02-08 | End: 2022-02-15

## 2022-02-08 RX ORDER — TAMSULOSIN HYDROCHLORIDE 0.4 MG/1
2 CAPSULE ORAL
Qty: 0 | Refills: 0 | DISCHARGE
Start: 2022-02-08

## 2022-02-08 RX ORDER — PHENAZOPYRIDINE HCL 100 MG
1 TABLET ORAL
Qty: 15 | Refills: 0
Start: 2022-02-08 | End: 2022-02-12

## 2022-02-08 RX ORDER — VALACYCLOVIR 500 MG/1
1 TABLET, FILM COATED ORAL
Qty: 14 | Refills: 0
Start: 2022-02-08 | End: 2022-02-14

## 2022-02-08 RX ADMIN — Medication 200 MILLIGRAM(S): at 05:21

## 2022-02-08 RX ADMIN — Medication 12.5 MILLIGRAM(S): at 05:22

## 2022-02-08 RX ADMIN — Medication 200 MILLIGRAM(S): at 13:49

## 2022-02-08 RX ADMIN — Medication 200 MILLIGRAM(S): at 00:33

## 2022-02-08 NOTE — PROGRESS NOTE ADULT - PROBLEM SELECTOR PLAN 1
criteria for severe sepsis w/ HR>90, WBC>12, T>38 at home, hypotension, elevated lactate, bandemia  BCx w/ E. coli  CTAP w/ evidence of prostatitis, cystitis  - 02/03 U/A pos., and UCx w/ E. coli  - s/p Zosyn (2/3-2/6), c/w CTX 1g qd (2/6- ) while inpatient  - f/u ID for Abx duration  - plan for f/u with outpatient urology if further extended abx therapy needed for prostatitis  - Monitor WBC, trend fever curve, vital criteria for severe sepsis w/ HR>90, WBC>12, T>38 at home, hypotension, elevated lactate, bandemia  BCx w/ E. coli  CTAP w/ evidence of prostatitis, cystitis  - 02/03 U/A pos., and UCx w/ E. coli  - s/p Zosyn (2/3-2/6), c/w CTX 1g qd (2/6- ) while inpatient  - per ID c/w PO Levaquin 500mg qd, for total 14d course Abx (02/03-02/16)  - plan for f/u with outpatient urology if further extended abx therapy needed for prostatitis  - Monitor WBC, trend fever curve, vital

## 2022-02-08 NOTE — PROGRESS NOTE ADULT - PROBLEM SELECTOR PLAN 9
DVT ppx: SQH  Diet: CC/DASH diet  Dispo: PT recommends home w/ no skilled PT needs
DVT ppx: SQH  Diet: CC/DASH diet  Dispo: PT recommends home w/ no skilled PT needs

## 2022-02-08 NOTE — PROGRESS NOTE ADULT - PROBLEM SELECTOR PROBLEM 3
MYRANDA (acute kidney injury)

## 2022-02-08 NOTE — PROGRESS NOTE ADULT - PROBLEM SELECTOR PLAN 8
LEFT Adrenal adenoma, incidental finding  - 02/03 CT-A/P - w/ 4.6 x 3.7 cm left adrenal nodule with Hounsfield units less than 10, compatible with an adenoma. Right adrenal gland normal.  - no acute interventions indicated at this time, f/u outpt for further eval

## 2022-02-08 NOTE — PROGRESS NOTE ADULT - ATTENDING COMMENTS
66M w/pmh multiple sclerosis (not on meds), pre-DM, HTN, BPH who presents 9 days after Rezum prostate procedure w/sepsis 2/2 E coli cystitis, prostatitis, bacteremia. Initially on ceftriaxone, now transitioned to PO levaquin.    -appreciate consult ID for abx duration - imaging findings unlikely from prostatitis given lack of pain, will treat for 14 days   -TOV passed. Cont flomax 0.8mg  -upper and lower lip crusting with vesicles below the lower lip - treat for HSV w/acyclovir x7 days    Discharge home today. 35 minutes spent coordinating discharge.

## 2022-02-08 NOTE — PROGRESS NOTE ADULT - ASSESSMENT
66M with MS, pre-DM, HTN, BPH who presents 9 days following Rezum prostate procedure with fevers, chills, hypotension. He has a history of BPH that has previously been managed with tamsulosin. He underwent Rezum procedure with Dr. Awad on 1/25 and was d/c'ed home without complications. No pre/post procedure antibiotics.  He thinks he received a dose of IV antibiotics.  Palencia removed 3 days PTA (admitted 2/3) with fever day PTA and associated rigors with hypotension.   Here Tm 100.3 WBC 18K.  BC and UC with E coli (pan-S).  No prostate tenderness on rectal exam (per patient).  Antibiotics narrowed from zosyn to ceftriaxone. Today is D#5    Sepsis secondary to  procedure  - no definite prostatitis  - agree with transition to oral levaquin 500mg daily  - to complete 14 days total antibiotics (today is D#6 antibiotics)    Leukocytosis/fever  - both are improved    Dispo:  - d/c planning    Please call Infectious Diseases if there is a change in status.  Thank you.  [phone].

## 2022-02-08 NOTE — PROGRESS NOTE ADULT - PROVIDER SPECIALTY LIST ADULT
Urology
Infectious Disease
Internal Medicine

## 2022-02-08 NOTE — PROGRESS NOTE ADULT - PROBLEM SELECTOR PLAN 2
Patient w/ recent Rezum prostate procedure  Noted E. coli bacteremia, CT-A/P w/ prostatitis, cystitis  - F/u culture sensitivities. c/w dysuria, urinary hesitancy  - d/c Zosyn, consider extended course of PO abx to cover prostatitis  - Per urology, okay to dc louise; undergoing TOV - UOP 2.7L o/n  - add Pyridium for dysuria  - Trend WBC, fever curve, vitals Patient w/ recent Rezum prostate procedure  Noted E. coli bacteremia, CT-A/P w/ prostatitis, cystitis  - F/u culture sensitivities. c/w dysuria, urinary hesitancy  - d/c Zosyn, consider extended course of PO abx to cover prostatitis  - appreciate ID recs, c/w PO Levaquin 500mg qd, for total 14d course Abx (02/03-02/16)  - Per urology, okay to dc louise; undergoing TOV - UOP 2.7L o/n  - c/w Pyridium for dysuria  - Trend WBC, fever curve, vitals

## 2022-02-08 NOTE — PROGRESS NOTE ADULT - PROBLEM SELECTOR PLAN 3
Resolved  Noted elevated Cr to 1.50 on admission  Likely pre-renal i/s/o hypotension  No evidence of urinary retention on bladder scan  - Urology on board, appreciate recs  - Palencia in place, discuss d/c as above  - Strict Is/Os  - Monitor BMP qd
Resolved  Noted elevated Cr to 1.50 on admission  Likely pre-renal i/s/o hypotension  No evidence of urinary retention on bladder scan  - Urology on board, appreciate recs  - Palencia in place, discuss d/c as above  - Strict Is/Os  - Monitor BMP qd
Resolved, BUN/Cr 18/0.94  Noted elevated Cr to 1.50 on admission, pre-renal i/s/o hypotension  No evidence of urinary retention on bladder scan  - Urology on board, appreciate recs  - Strict Is/Os  - Monitor BMP qd
Noted elevated Cr to 1.50 on admission  Likely pre-renal i/s/o hypotension  No evidence of urinary retention on bladder scan  - Urology on board, appreciate recs  - IVF as above  - Send urine creatinine, sodium  - Maintain Palencia for now  - Strict Is/Os  - Monitor BMP qd
Resolved, BUN/Cr 18/0.94  Noted elevated Cr to 1.50 on admission, pre-renal i/s/o hypotension  No evidence of urinary retention on bladder scan  - Urology on board, appreciate recs  - Strict Is/Os  - Monitor BMP qd

## 2022-02-08 NOTE — DISCHARGE NOTE NURSING/CASE MANAGEMENT/SOCIAL WORK - PATIENT PORTAL LINK FT
You can access the FollowMyHealth Patient Portal offered by [facility] by registering at the following website: [email or web address]. By joining [facility]’s FollowMyHealth portal, you will also be able to view your health information using other applications (apps) compatible with our system.

## 2022-02-08 NOTE — DISCHARGE NOTE NURSING/CASE MANAGEMENT/SOCIAL WORK - NSDCPEFALRISK_GEN_ALL_CORE
For information on Fall & Injury Prevention, visit: [email or web address] OR  [email or web address] OR  https://www.cdc.gov/steadi/patient.html

## 2022-02-08 NOTE — PROGRESS NOTE ADULT - PROBLEM SELECTOR PLAN 5
h/o BPH s/p Rezum procedure 9 days ago  - c/w strict Is/Os  - increase Tamsulosin 0.4-->0.8 mg qhs  - Urology on board, appreciate recs h/o BPH s/p Rezum procedure 9 days ago  - c/w strict Is/Os  - c/w Tamsulosin 0.8 mg qhs  - Urology on board, appreciate recs

## 2022-02-08 NOTE — PROGRESS NOTE ADULT - PROBLEM SELECTOR PLAN 4
On home HCTZ 25mg, losartan 100mg, metoprolol succinate 25mg  - c/w metoprolol tartrate 12.5mg BID, transition to home metoprolol succinate 25mg if tolerating  - resume home antihypertensives as needed HR: 101 (08 Feb 2022 08:34) (77 - 107)  BP: 128/85 (08 Feb 2022 08:34) (108/69 - 134/85)  On home HCTZ 25mg, losartan 100mg, metoprolol succinate 25mg  - c/w metoprolol tartrate 12.5mg BID, transition to home metoprolol succinate 25mg if tolerating  - resume home antihypertensives as needed

## 2022-02-08 NOTE — PROGRESS NOTE ADULT - SUBJECTIVE AND OBJECTIVE BOX
PROGRESS NOTE:   Authored By: Hugh Krueger M.D. (PGY-1)  STEPHANIE Team 03  Available on TEAMS / Pager: [phone]    ***IN PROGRESS***    Patient is a 66y old  Male who presents with a chief complaint of Fever (07 Feb 2022 15:42)      OVERNIGHT EVENTS: No acute events overnight    SUBJECTIVE: Pt seen and examined at bedside this morning.     ADDITIONAL REVIEW OF SYSTEMS:    MEDICATIONS  (STANDING):  levoFLOXacin  Tablet 500 milliGRAM(s) Oral every 24 hours  metoprolol tartrate 12.5 milliGRAM(s) Oral two times a day  phenazopyridine 200 milliGRAM(s) Oral every 8 hours  tamsulosin 0.8 milliGRAM(s) Oral at bedtime    MEDICATIONS  (PRN):    CAPILLARY BLOOD GLUCOSE        I&O's Summary    07 Feb 2022 07:01  -  08 Feb 2022 07:00  --------------------------------------------------------  IN: 840 mL / OUT: 1500 mL / NET: -660 mL        PHYSICAL EXAM:  Vital Signs Last 24 Hrs  T(C): 36.9 (08 Feb 2022 04:44), Max: 37.2 (07 Feb 2022 21:32)  T(F): 98.5 (08 Feb 2022 04:44), Max: 98.9 (07 Feb 2022 21:32)  HR: 102 (08 Feb 2022 04:44) (77 - 107)  BP: 109/67 (08 Feb 2022 04:44) (108/69 - 134/85)  BP(mean): --  RR: 17 (08 Feb 2022 04:44) (17 - 18)  SpO2: 95% (08 Feb 2022 04:44) (95% - 97%)    GENERAL: NAD, well-developed  HEENT: sclera clear  CHEST/LUNG: Clear to auscultation bilaterally; No wheezes or rales  HEART: Regular rate and rhythm; No murmurs, rubs, or gallops  ABDOMEN: Soft, Nontender, Nondistended; Bowel sounds present  EXTREMITIES:  2+ Peripheral Pulses, No clubbing, cyanosis, or edema  PSYCH: AAOx3  NEUROLOGY: non-focal  SKIN: No rashes or lesions    LABS:                        11.5   9.10  )-----------( 168      ( 07 Feb 2022 06:22 )             35.8     02-07    137  |  102  |  18  ----------------------------<  118<H>  4.3   |  24  |  0.94    Ca    9.7      07 Feb 2022 06:23  Phos  3.3     02-07  Mg     1.9     02-07                Culture - Blood (collected 05 Feb 2022 12:04)  Source: .Blood Blood-Peripheral  Preliminary Report (06 Feb 2022 13:01):    No growth to date.    Culture - Blood (collected 05 Feb 2022 12:04)  Source: .Blood Blood-Peripheral  Preliminary Report (06 Feb 2022 13:01):    No growth to date.        RADIOLOGY & ADDITIONAL TESTS:    Results Reviewed:   Imaging Personally Reviewed:  Electrocardiogram Personally Reviewed:    COORDINATION OF CARE:  Care Discussed with Consultants/Other Providers [Y/N]:  Prior or Outpatient Records Reviewed [Y/N]:   PROGRESS NOTE:   Authored By: Hugh Krueger M.D. (PGY-1)  STEPHANIE Team 03  Available on TEAMS / Pager: [phone]    ***IN PROGRESS***    Patient is a 66y old  Male who presents with a chief complaint of Fever (07 Feb 2022 15:42)    OVERNIGHT EVENTS: No acute events overnight    SUBJECTIVE: Pt seen and examined at bedside this morning.     ADDITIONAL REVIEW OF SYSTEMS:    MEDICATIONS  (STANDING):  levoFLOXacin  Tablet 500 milliGRAM(s) Oral every 24 hours  metoprolol tartrate 12.5 milliGRAM(s) Oral two times a day  phenazopyridine 200 milliGRAM(s) Oral every 8 hours  tamsulosin 0.8 milliGRAM(s) Oral at bedtime    MEDICATIONS  (PRN):    CAPILLARY BLOOD GLUCOSE        I&O's Summary    07 Feb 2022 07:01  -  08 Feb 2022 07:00  --------------------------------------------------------  IN: 840 mL / OUT: 1500 mL / NET: -660 mL        PHYSICAL EXAM:  Vital Signs Last 24 Hrs  T(C): 36.9 (08 Feb 2022 04:44), Max: 37.2 (07 Feb 2022 21:32)  T(F): 98.5 (08 Feb 2022 04:44), Max: 98.9 (07 Feb 2022 21:32)  HR: 102 (08 Feb 2022 04:44) (77 - 107)  BP: 109/67 (08 Feb 2022 04:44) (108/69 - 134/85)  BP(mean): --  RR: 17 (08 Feb 2022 04:44) (17 - 18)  SpO2: 95% (08 Feb 2022 04:44) (95% - 97%)    GENERAL: NAD, well-developed  HEENT: sclera clear  CHEST/LUNG: Clear to auscultation bilaterally; No wheezes or rales  HEART: Regular rate and rhythm; No murmurs, rubs, or gallops  ABDOMEN: Soft, Nontender, Nondistended; Bowel sounds present  EXTREMITIES:  2+ Peripheral Pulses, No clubbing, cyanosis, or edema  PSYCH: AAOx3  NEUROLOGY: non-focal  SKIN: No rashes or lesions    LABS:                        11.5   9.10  )-----------( 168      ( 07 Feb 2022 06:22 )             35.8     02-07    137  |  102  |  18  ----------------------------<  118<H>  4.3   |  24  |  0.94    Ca    9.7      07 Feb 2022 06:23  Phos  3.3     02-07  Mg     1.9     02-07                Culture - Blood (collected 05 Feb 2022 12:04)  Source: .Blood Blood-Peripheral  Preliminary Report (06 Feb 2022 13:01):    No growth to date.    Culture - Blood (collected 05 Feb 2022 12:04)  Source: .Blood Blood-Peripheral  Preliminary Report (06 Feb 2022 13:01):    No growth to date.        RADIOLOGY & ADDITIONAL TESTS:    Results Reviewed:   Imaging Personally Reviewed:  Electrocardiogram Personally Reviewed:    COORDINATION OF CARE:  Care Discussed with Consultants/Other Providers [Y/N]:  Prior or Outpatient Records Reviewed [Y/N]:

## 2022-02-08 NOTE — PROGRESS NOTE ADULT - PROBLEM SELECTOR PLAN 7
Patient with anemia to 10.6 on admission, MCV 62.4  Reports prior history of thalassemia   - Monitor CBC daily  - Transfuse hgb <7
Patient with anemia to 10.6 on admission, MCV 62.4  Reports prior history of thalassemia   - Monitor CBC daily  - Transfuse hgb <7
Patient with anemia to 10.6 on admission-->11.5, MCV 62.4  Reports prior history of thalassemia   - Monitor CBC daily  - Transfuse hgb <7  - iron studies suggestive of anemia of chronic disease
Patient with anemia to 10.6 on admission, MCV 62.4  Reports prior history of thalassemia   - Monitor CBC daily  - Transfuse hgb <7  - iron studies suggestive of anemia of chronic disease
Patient with anemia to 10.6 on admission-->11.5, MCV 62.4  Reports prior history of thalassemia   - Monitor CBC daily  - Transfuse hgb <7  - iron studies suggestive of anemia of chronic disease

## 2022-02-09 PROBLEM — E78.5 HYPERLIPIDEMIA, UNSPECIFIED: Chronic | Status: ACTIVE | Noted: 2022-02-03

## 2022-02-09 PROBLEM — I10 ESSENTIAL (PRIMARY) HYPERTENSION: Chronic | Status: ACTIVE | Noted: 2022-02-03

## 2022-02-10 ENCOUNTER — LABORATORY RESULT (OUTPATIENT)
Age: 67
End: 2022-02-10

## 2022-02-10 ENCOUNTER — APPOINTMENT (OUTPATIENT)
Dept: INTERNAL MEDICINE | Facility: CLINIC | Age: 67
End: 2022-02-10
Payer: MEDICARE

## 2022-02-10 VITALS
DIASTOLIC BLOOD PRESSURE: 76 MMHG | HEIGHT: 67 IN | RESPIRATION RATE: 12 BRPM | WEIGHT: 176 LBS | HEART RATE: 80 BPM | TEMPERATURE: 96.3 F | SYSTOLIC BLOOD PRESSURE: 105 MMHG | BODY MASS INDEX: 27.62 KG/M2 | OXYGEN SATURATION: 100 %

## 2022-02-10 VITALS — SYSTOLIC BLOOD PRESSURE: 130 MMHG | DIASTOLIC BLOOD PRESSURE: 88 MMHG

## 2022-02-10 LAB
CULTURE RESULTS: SIGNIFICANT CHANGE UP
CULTURE RESULTS: SIGNIFICANT CHANGE UP
SPECIMEN SOURCE: SIGNIFICANT CHANGE UP
SPECIMEN SOURCE: SIGNIFICANT CHANGE UP

## 2022-02-10 PROCEDURE — 99214 OFFICE O/P EST MOD 30 MIN: CPT

## 2022-02-10 RX ORDER — MELOXICAM 7.5 MG/1
7.5 TABLET ORAL DAILY
Qty: 1 | Refills: 0 | Status: COMPLETED | COMMUNITY
Start: 2020-11-25 | End: 2022-02-10

## 2022-02-10 NOTE — PHYSICAL EXAM
[No Acute Distress] : no acute distress [Well Nourished] : well nourished [Well Developed] : well developed [Well-Appearing] : well-appearing [Normal Sclera/Conjunctiva] : normal sclera/conjunctiva [EOMI] : extraocular movements intact [Normal Outer Ear/Nose] : the outer ears and nose were normal in appearance [Normal Oropharynx] : the oropharynx was normal [Normal TMs] : both tympanic membranes were normal [No JVD] : no jugular venous distention [No Lymphadenopathy] : no lymphadenopathy [Supple] : supple [No Respiratory Distress] : no respiratory distress  [No Accessory Muscle Use] : no accessory muscle use [Clear to Auscultation] : lungs were clear to auscultation bilaterally [Normal Rate] : normal rate  [Regular Rhythm] : with a regular rhythm [Normal S1, S2] : normal S1 and S2 [No Murmur] : no murmur heard [Pedal Pulses Present] : the pedal pulses are present [No Edema] : there was no peripheral edema [No Extremity Clubbing/Cyanosis] : no extremity clubbing/cyanosis [Soft] : abdomen soft [Non Tender] : non-tender [Non-distended] : non-distended [Normal Bowel Sounds] : normal bowel sounds [Normal Supraclavicular Nodes] : no supraclavicular lymphadenopathy [Normal Posterior Cervical Nodes] : no posterior cervical lymphadenopathy [Normal Anterior Cervical Nodes] : no anterior cervical lymphadenopathy [No CVA Tenderness] : no CVA  tenderness [No Spinal Tenderness] : no spinal tenderness [No Joint Swelling] : no joint swelling [Grossly Normal Strength/Tone] : grossly normal strength/tone [No Rash] : no rash [Coordination Grossly Intact] : coordination grossly intact [No Focal Deficits] : no focal deficits [Normal Gait] : normal gait [Speech Grossly Normal] : speech grossly normal [Memory Grossly Normal] : memory grossly normal [Normal Affect] : the affect was normal [Alert and Oriented x3] : oriented to person, place, and time [Normal Mood] : the mood was normal [Normal Insight/Judgement] : insight and judgment were intact

## 2022-02-17 ENCOUNTER — APPOINTMENT (OUTPATIENT)
Dept: INTERNAL MEDICINE | Facility: CLINIC | Age: 67
End: 2022-02-17
Payer: MEDICARE

## 2022-02-17 VITALS
HEART RATE: 98 BPM | SYSTOLIC BLOOD PRESSURE: 104 MMHG | WEIGHT: 176 LBS | TEMPERATURE: 96.9 F | DIASTOLIC BLOOD PRESSURE: 67 MMHG | BODY MASS INDEX: 27.57 KG/M2 | OXYGEN SATURATION: 99 % | RESPIRATION RATE: 12 BRPM

## 2022-02-17 PROCEDURE — 99214 OFFICE O/P EST MOD 30 MIN: CPT

## 2022-02-17 NOTE — HISTORY OF PRESENT ILLNESS
[de-identified] : 66 year old male with h/o MS / BPH / Pre diabetes / Htn presents s/p hospital d/c on 2/08/2022 for urosepsis treated with fluid resuscitation and IV abx, continues on Levofloxacin & Pyridium. He presents to the office today with concerns of possible increased WBC (per pt on electronic results ), decreased BP, and is concerned that his infection is returning. \par He denies fever, chills, cp, sob, palpitations, lower extremity edema, n/v/d or constipation, dysuria, abdominal pain.\par He offers no complaints other than mild ongoing tiredness post-hospital d/c.

## 2022-02-17 NOTE — PHYSICAL EXAM
[No Acute Distress] : no acute distress [Well Nourished] : well nourished [Well Developed] : well developed [Well-Appearing] : well-appearing [Normal Sclera/Conjunctiva] : normal sclera/conjunctiva [EOMI] : extraocular movements intact [Normal Outer Ear/Nose] : the outer ears and nose were normal in appearance [Normal Oropharynx] : the oropharynx was normal [Normal TMs] : both tympanic membranes were normal [No JVD] : no jugular venous distention [No Lymphadenopathy] : no lymphadenopathy [Supple] : supple [No Respiratory Distress] : no respiratory distress  [No Accessory Muscle Use] : no accessory muscle use [Clear to Auscultation] : lungs were clear to auscultation bilaterally [Normal Rate] : normal rate  [Regular Rhythm] : with a regular rhythm [Normal S1, S2] : normal S1 and S2 [No Murmur] : no murmur heard [Pedal Pulses Present] : the pedal pulses are present [No Edema] : there was no peripheral edema [No Extremity Clubbing/Cyanosis] : no extremity clubbing/cyanosis [Soft] : abdomen soft [Non Tender] : non-tender [Normal Bowel Sounds] : normal bowel sounds [Non-distended] : non-distended [Normal Supraclavicular Nodes] : no supraclavicular lymphadenopathy [Normal Posterior Cervical Nodes] : no posterior cervical lymphadenopathy [Normal Anterior Cervical Nodes] : no anterior cervical lymphadenopathy [No CVA Tenderness] : no CVA  tenderness [No Spinal Tenderness] : no spinal tenderness [Grossly Normal Strength/Tone] : grossly normal strength/tone [No Joint Swelling] : no joint swelling [No Rash] : no rash [Coordination Grossly Intact] : coordination grossly intact [No Focal Deficits] : no focal deficits [Normal Gait] : normal gait [Speech Grossly Normal] : speech grossly normal [Memory Grossly Normal] : memory grossly normal [Normal Affect] : the affect was normal [Alert and Oriented x3] : oriented to person, place, and time [Normal Mood] : the mood was normal [Normal Insight/Judgement] : insight and judgment were intact

## 2022-02-18 NOTE — PLAN
[FreeTextEntry1] : HTN\par Continue Metoprolol 25 mg QD\par hold HCTZ & Losartan \par \par Adrenal Adenoma\par  endocrinology eval pending\par \par BPH & s/p recent  Urosepsis\par continue Flomax 0.4 mg QD\par follows with urology\par repeat  CBC, CMP Nl\par \par \par

## 2022-02-18 NOTE — HISTORY OF PRESENT ILLNESS
[de-identified] : 66 year old male with h/o MS / BPH / Pre diabetes / Htn / s/p recent hospitalization for  urosepsis presents for follow up on labs and hypertension \par He denies fever, chills, cp, sob,  dysuria, abdominal pain.\par He is monitoring hid BP at home and reports Nl BP readings off Losartan and HCTZ . He takes Metoprolol daily

## 2022-02-22 LAB
ALBUMIN SERPL ELPH-MCNC: 4 G/DL
ALP BLD-CCNC: 76 U/L
ALT SERPL-CCNC: 43 U/L
ANION GAP SERPL CALC-SCNC: 14 MMOL/L
AST SERPL-CCNC: 26 U/L
BASOPHILS # BLD AUTO: 0.08 K/UL
BASOPHILS NFR BLD AUTO: 0.9 %
BILIRUB SERPL-MCNC: 0.5 MG/DL
BUN SERPL-MCNC: 18 MG/DL
CALCIUM SERPL-MCNC: 9.8 MG/DL
CHLORIDE SERPL-SCNC: 99 MMOL/L
CO2 SERPL-SCNC: 23 MMOL/L
CREAT SERPL-MCNC: 0.99 MG/DL
EOSINOPHIL # BLD AUTO: 0.15 K/UL
EOSINOPHIL NFR BLD AUTO: 1.7 %
GLUCOSE SERPL-MCNC: 75 MG/DL
HCT VFR BLD CALC: 39.9 %
HGB BLD-MCNC: 11.8 G/DL
LYMPHOCYTES # BLD AUTO: 2.58 K/UL
LYMPHOCYTES NFR BLD AUTO: 29.5 %
MAN DIFF?: NORMAL
MCHC RBC-ENTMCNC: 19.4 PG
MCHC RBC-ENTMCNC: 29.6 GM/DL
MCV RBC AUTO: 65.7 FL
MONOCYTES # BLD AUTO: 0.53 K/UL
MONOCYTES NFR BLD AUTO: 6.1 %
NEUTROPHILS # BLD AUTO: 5.02 K/UL
NEUTROPHILS NFR BLD AUTO: 57.4 %
PLATELET # BLD AUTO: 306 K/UL
POTASSIUM SERPL-SCNC: 4.5 MMOL/L
PROT SERPL-MCNC: 7.4 G/DL
RBC # BLD: 6.07 M/UL
RBC # FLD: 19.1 %
SODIUM SERPL-SCNC: 136 MMOL/L
WBC # FLD AUTO: 8.74 K/UL

## 2022-02-28 NOTE — PLAN
[FreeTextEntry1] : HTN\par Continue Metoprolol 25 mg QD\par hold HCTZ & Losartan pending reevaluation in one week\par Discussed & reinforced the importance of a low sodium, low fat diet and exercise. \par \par Adrenal Adenoma\par referral for endocrinology provided\par \par BPH & s/p Urosepsis\par continue Flomax 0.4 mg QD\par follows with urology, next appointment Monday 2/14/2022\par continue Levofloxacin until completed, continue Pyridium PRN\par will send CBC, CMP\par \par f/u in one week for lab results and reevaluation of BP and medications\par

## 2022-02-28 NOTE — HISTORY OF PRESENT ILLNESS
[de-identified] : 66 year old male with h/o MS / BPH / Pre diabetes / Htn / recent urosepsis  presents for follow up.\par He is feeling better, denies dysuria. He is  monitoring BP at home and reports good control .\par He denies fever, chills, cp, sob, palpitations, lower extremity edema, n/v/d or constipation, dysuria, abdominal pain.\par

## 2022-03-01 ENCOUNTER — NON-APPOINTMENT (OUTPATIENT)
Age: 67
End: 2022-03-01

## 2022-03-18 ENCOUNTER — APPOINTMENT (OUTPATIENT)
Dept: INTERNAL MEDICINE | Facility: CLINIC | Age: 67
End: 2022-03-18

## 2022-04-12 ENCOUNTER — APPOINTMENT (OUTPATIENT)
Dept: INTERNAL MEDICINE | Facility: CLINIC | Age: 67
End: 2022-04-12
Payer: MEDICARE

## 2022-04-12 VITALS
SYSTOLIC BLOOD PRESSURE: 130 MMHG | TEMPERATURE: 97.1 F | WEIGHT: 176 LBS | HEIGHT: 67 IN | HEART RATE: 86 BPM | DIASTOLIC BLOOD PRESSURE: 84 MMHG | OXYGEN SATURATION: 98 % | RESPIRATION RATE: 12 BRPM | BODY MASS INDEX: 27.62 KG/M2

## 2022-04-12 VITALS — DIASTOLIC BLOOD PRESSURE: 68 MMHG | SYSTOLIC BLOOD PRESSURE: 122 MMHG

## 2022-04-12 DIAGNOSIS — Z00.00 ENCOUNTER FOR GENERAL ADULT MEDICAL EXAMINATION W/OUT ABNORMAL FINDINGS: ICD-10-CM

## 2022-04-12 PROCEDURE — 99214 OFFICE O/P EST MOD 30 MIN: CPT

## 2022-04-12 NOTE — PHYSICAL EXAM
[No Acute Distress] : no acute distress [Well Nourished] : well nourished [Normal Sclera/Conjunctiva] : normal sclera/conjunctiva [EOMI] : extraocular movements intact [Normal Outer Ear/Nose] : the outer ears and nose were normal in appearance [Normal Oropharynx] : the oropharynx was normal [No JVD] : no jugular venous distention [No Lymphadenopathy] : no lymphadenopathy [Supple] : supple [Thyroid Normal, No Nodules] : the thyroid was normal and there were no nodules present [No Respiratory Distress] : no respiratory distress  [No Accessory Muscle Use] : no accessory muscle use [Clear to Auscultation] : lungs were clear to auscultation bilaterally [Normal Rate] : normal rate  [Regular Rhythm] : with a regular rhythm [Normal S1, S2] : normal S1 and S2 [No Murmur] : no murmur heard [Pedal Pulses Present] : the pedal pulses are present [No Edema] : there was no peripheral edema [No Extremity Clubbing/Cyanosis] : no extremity clubbing/cyanosis [Soft] : abdomen soft [Non Tender] : non-tender [Non-distended] : non-distended [No Masses] : no abdominal mass palpated [No HSM] : no HSM [Normal Bowel Sounds] : normal bowel sounds [Normal Posterior Cervical Nodes] : no posterior cervical lymphadenopathy [Normal Anterior Cervical Nodes] : no anterior cervical lymphadenopathy [No CVA Tenderness] : no CVA  tenderness [No Spinal Tenderness] : no spinal tenderness [No Joint Swelling] : no joint swelling [Grossly Normal Strength/Tone] : grossly normal strength/tone [No Rash] : no rash [Coordination Grossly Intact] : coordination grossly intact [No Focal Deficits] : no focal deficits [Normal Gait] : normal gait [Normal Affect] : the affect was normal [Normal Insight/Judgement] : insight and judgment were intact

## 2022-04-12 NOTE — PLAN
[FreeTextEntry1] : Follow-up \par \par BPH\par Follows with urology \par cont Flomax 0.4 mg daily\par \par HTN\par Low sodium, low cholesterol diet/ increased physical activity\par cont HCTZ 25 mg daily\par cont Losartan 100 mg daily\par cont Metoprolol 25 mg daily\par \par Hypercholesterolemia\par need low fat/ low cholesterol diet / exercise / repeat fasting lipids today \par \par \par Labs ordered pt to follow-up in 1 week for results

## 2022-04-12 NOTE — HISTORY OF PRESENT ILLNESS
[de-identified] : Patient is a 66 year old male with history of HTN, HLD, BPH presents for follow-up on chronic problems \par \par He feels well\par He has been more active but has not been consistent with diet lately \par Offers no complaints \par \par Denies any CP, SOB, HA, dizziness, N/V or abdominal pain

## 2022-04-20 LAB
ALBUMIN SERPL ELPH-MCNC: 4.3 G/DL
ALP BLD-CCNC: 74 U/L
ALT SERPL-CCNC: 18 U/L
ANION GAP SERPL CALC-SCNC: 11 MMOL/L
AST SERPL-CCNC: 15 U/L
BILIRUB SERPL-MCNC: 0.7 MG/DL
BUN SERPL-MCNC: 15 MG/DL
CALCIUM SERPL-MCNC: 10 MG/DL
CHLORIDE SERPL-SCNC: 100 MMOL/L
CO2 SERPL-SCNC: 27 MMOL/L
COVID-19 NUCLEOCAPSID  GAM ANTIBODY INTERPRETATION: POSITIVE
COVID-19 SPIKE DOMAIN ANTIBODY INTERPRETATION: POSITIVE
CREAT SERPL-MCNC: 0.77 MG/DL
EGFR: 98 ML/MIN/1.73M2
ESTIMATED AVERAGE GLUCOSE: 120 MG/DL
GLUCOSE SERPL-MCNC: 105 MG/DL
HBA1C MFR BLD HPLC: 5.8 %
POTASSIUM SERPL-SCNC: 3.8 MMOL/L
PROT SERPL-MCNC: 7.2 G/DL
SARS-COV-2 AB SERPL IA-ACNC: >250 U/ML
SARS-COV-2 AB SERPL QL IA: 5.55 INDEX
SODIUM SERPL-SCNC: 138 MMOL/L

## 2022-04-21 LAB
CHOLEST SERPL-MCNC: 186 MG/DL
HDLC SERPL-MCNC: 56 MG/DL
LDLC SERPL CALC-MCNC: 112 MG/DL
NONHDLC SERPL-MCNC: 130 MG/DL
TRIGL SERPL-MCNC: 92 MG/DL

## 2022-05-10 ENCOUNTER — NON-APPOINTMENT (OUTPATIENT)
Age: 67
End: 2022-05-10

## 2022-05-11 ENCOUNTER — APPOINTMENT (OUTPATIENT)
Dept: ENDOCRINOLOGY | Facility: CLINIC | Age: 67
End: 2022-05-11
Payer: MEDICARE

## 2022-05-11 VITALS
TEMPERATURE: 98 F | SYSTOLIC BLOOD PRESSURE: 124 MMHG | BODY MASS INDEX: 29.05 KG/M2 | HEIGHT: 65.35 IN | HEART RATE: 72 BPM | DIASTOLIC BLOOD PRESSURE: 80 MMHG | WEIGHT: 176.46 LBS | OXYGEN SATURATION: 98 %

## 2022-05-11 DIAGNOSIS — R73.03 PREDIABETES.: ICD-10-CM

## 2022-05-11 PROCEDURE — 36415 COLL VENOUS BLD VENIPUNCTURE: CPT

## 2022-05-11 PROCEDURE — 99203 OFFICE O/P NEW LOW 30 MIN: CPT | Mod: 25

## 2022-05-11 NOTE — HISTORY OF PRESENT ILLNESS
[FreeTextEntry1] : CC: Adrenal nodule\par This is a 66-year-old male with prediabetes, hypertension, hyperlipidemia, BPH, multiple sclerosis, kidney stone, here for evaluation of left adrenal mass.\par He reports adrenal mass was discovered incidentally in February 2017.  MRI abdomen from September 2017 showed normal right adrenal gland, 35 x 43 x 36 mm mass in the left adrenal which is unchanged in morphology from prior CT scan from February 2017 and characteristics typical for benign lipid rich adenoma.\par CT abdomen pelvis without IV contrast in March 2019 for kidney stones showed stable 4.5 x 3.4 cm left adrenal mass benign lipid rich adenoma.\par On February 3, 2022 he underwent CT of the abdomen/pelvis for UTI which showed left 4.6 x 3.7 cm left adrenal nodule with 10 HU compatible with adenoma.\par He has not had hormonal work-up for adrenal adenoma in the past per patient.\par No family history of endocrine tumors.\par Denies complaints today.

## 2022-05-11 NOTE — PHYSICAL EXAM
[Alert] : alert [Well Nourished] : well nourished [Healthy Appearance] : healthy appearance [No Acute Distress] : no acute distress [Well Developed] : well developed [Normal Voice/Communication] : normal voice communication [Normal Sclera/Conjunctiva] : normal sclera/conjunctiva [No Proptosis] : no proptosis [No Neck Mass] : no neck mass was observed [No LAD] : no lymphadenopathy [Supple] : the neck was supple [No Respiratory Distress] : no respiratory distress [Not Tender] : non-tender [Not Distended] : not distended [Soft] : abdomen soft [No Stigmata of Cushings Syndrome] : no stigmata of Cushings Syndrome [Acanthosis Nigricans] : no acanthosis nigricans [Normal Affect] : the affect was normal [Normal Insight/Judgement] : insight and judgment were intact [Normal Mood] : the mood was normal

## 2022-05-11 NOTE — ASSESSMENT
[FreeTextEntry1] : This is a 66-year-old male with prediabetes, hypertension, hyperlipidemia, BPH, multiple sclerosis, kidney stone, here for evaluation of left adrenal mass.\par He reports adrenal mass was discovered incidentally in February 2017.  MRI abdomen from September 2017 showed normal right adrenal gland, 35 x 43 x 36 mm mass in the left adrenal which is unchanged in morphology from prior CT scan from February 2017 and characteristics typical for benign lipid rich adenoma.\par CT abdomen pelvis without IV contrast in March 2019 for kidney stones showed stable 4.5 x 3.4 cm left adrenal mass benign lipid rich adenoma.\par On February 3, 2022 he underwent CT of the abdomen/pelvis for UTI which showed left 4.6 x 3.7 cm left adrenal nodule with 10 HU compatible with adenoma.\par He has not had hormonal work-up for adrenal adenoma in the past per patient.  Check plasma free metanephrines, catecholamines, aldosterone, renin, DHEA-S.  Check 24-hour urine cortisol collection to evaluate for subclinical Cushing syndrome.  Further management will be based on these results.\par Given size of adrenal adenoma greater than 4 cm, referred to Dr. Santy De La Fuente for possible surgical intervention.

## 2022-05-16 LAB
ALDOSTERONE SERUM: 6.6 NG/DL
CORTIS SERPL-MCNC: 8.8 UG/DL
DHEA-S SERPL-MCNC: 35.8 UG/DL

## 2022-05-19 LAB
CORTIS 24H UR-MCNC: 24 H
CORTIS 24H UR-MRATE: 36 MCG/24 H
METANEPHRINE, PL: 18.5 PG/ML
NORMETANEPHRINE, PL: 81.8 PG/ML
SPECIMEN VOL 24H UR: 1250 ML

## 2022-05-20 LAB — RENIN ACTIVITY, PLASMA: 6.41 NG/ML/HR

## 2022-05-24 ENCOUNTER — NON-APPOINTMENT (OUTPATIENT)
Age: 67
End: 2022-05-24

## 2022-05-24 LAB
DOPAMINE UR-MCNC: <30 PG/ML
EPINEPH UR-MCNC: 17 PG/ML
NOREPINEPH UR-MCNC: 284 PG/ML

## 2022-05-31 NOTE — ED PROVIDER NOTE - PR
[Medication Risks Reviewed] : Medication risks reviewed [de-identified] : Patient is a 47-year-old female with moderate to severe left knee osteoarthritis presenting today for initial evaluation.  She is not currently having any pain in her left knee and is not currently taking any medications other than the methadone.  I recommend she continue with conservative treatment this time.  I have given her prescription for physical therapy.  Have also recommended diclofenac gel as needed for her left knee.  She does have a history of PE and endocarditis and is unable to take NSAIDs.  I will see her back on an as-needed basis for her left knee possible cortisone injection at that time.  All questions asked and answered. 172

## 2022-06-03 ENCOUNTER — NON-APPOINTMENT (OUTPATIENT)
Age: 67
End: 2022-06-03

## 2022-06-05 ENCOUNTER — NON-APPOINTMENT (OUTPATIENT)
Age: 67
End: 2022-06-05

## 2022-06-06 ENCOUNTER — NON-APPOINTMENT (OUTPATIENT)
Age: 67
End: 2022-06-06

## 2022-06-06 ENCOUNTER — APPOINTMENT (OUTPATIENT)
Dept: SURGERY | Facility: CLINIC | Age: 67
End: 2022-06-06
Payer: MEDICARE

## 2022-06-06 DIAGNOSIS — D35.00 BENIGN NEOPLASM OF UNSPECIFIED ADRENAL GLAND: ICD-10-CM

## 2022-06-06 PROCEDURE — 99204 OFFICE O/P NEW MOD 45 MIN: CPT

## 2022-06-06 NOTE — PHYSICAL EXAM
[Respiratory Effort] : normal respiratory effort [Normal Heart Sounds] : normal heart sounds [No Rash or Lesion] : No rash or lesion [Alert] : alert [Oriented to Person] : oriented to person [Oriented to Place] : oriented to place [Oriented to Time] : oriented to time [Calm] : calm [de-identified] : NAD [de-identified] : EOM intact [de-identified] : Supple [de-identified] : soft, non-tender, non-distended.  No palpable masses or CVA tenderness. [de-identified] : KINCAID x 4

## 2022-06-07 DIAGNOSIS — R79.1 ABNORMAL COAGULATION PROFILE: ICD-10-CM

## 2022-06-07 LAB
25(OH)D3 SERPL-MCNC: 33.3 NG/ML
ACTH SER-ACNC: 7.7 PG/ML
APTT BLD: 29.7 SEC
CALCIUM SERPL-MCNC: 9.8 MG/DL
CALCIUM SERPL-MCNC: 9.8 MG/DL
CORTIS SERPL-MCNC: 2.2 UG/DL
INR PPP: 0.98 RATIO
PARATHYROID HORMONE INTACT: 43 PG/ML
PT BLD: 11.5 SEC

## 2022-06-15 LAB — DEXAMETHASONE LEVEL: 200 NG/DL

## 2022-06-28 ENCOUNTER — APPOINTMENT (OUTPATIENT)
Dept: INTERNAL MEDICINE | Facility: CLINIC | Age: 67
End: 2022-06-28

## 2022-07-05 NOTE — HISTORY OF PRESENT ILLNESS
[de-identified] : 65 year old male with h/o MS / BPH / Pre diabetes presents for follow up on chronic problems \par He is c/o RT knee pain x 3 weeks . Pain moderate worse with walking upstairs. He denies recent fall/ trauma \par He had flu shot outside\par pneumonia vaccine  2-3 yeas ago with Dr. Zhou\par He is following with neurologist Dr. Newby for MS > last  MRI 2 years ago\par Last eye exam 2 months ago\par He s otherwise denies CP/SOB, dizziness, abdominal pain  This patient has been assessed with a concern for Malnutrition and was treated during this hospitalization for the following Nutrition diagnosis/diagnoses:     -  07/06/2022: Morbid obesity (BMI > 40)

## 2022-07-07 ENCOUNTER — NON-APPOINTMENT (OUTPATIENT)
Age: 67
End: 2022-07-07

## 2022-07-07 LAB — CORTIS SAL-MCNC: NORMAL

## 2022-07-09 LAB
CORTIS 24H UR-MCNC: NORMAL
CORTIS 24H UR-MRATE: NORMAL
CORTIS SAL-MCNC: NORMAL
CORTIS SAL-MCNC: NORMAL
SPECIMEN VOL 24H UR: NORMAL

## 2022-07-13 ENCOUNTER — APPOINTMENT (OUTPATIENT)
Dept: SURGERY | Facility: CLINIC | Age: 67
End: 2022-07-13

## 2022-07-13 PROCEDURE — 99212 OFFICE O/P EST SF 10 MIN: CPT

## 2022-07-13 NOTE — PHYSICAL EXAM
[Respiratory Effort] : normal respiratory effort [Normal Heart Sounds] : normal heart sounds [No Rash or Lesion] : No rash or lesion [Alert] : alert [Oriented to Person] : oriented to person [Oriented to Place] : oriented to place [Oriented to Time] : oriented to time [Calm] : calm [de-identified] : NAD [de-identified] : EOM intact [de-identified] : Supple [de-identified] : soft, non-tender, non-distended.  No palpable masses or CVA tenderness. [de-identified] : KINCAID x 4

## 2022-08-04 ENCOUNTER — APPOINTMENT (OUTPATIENT)
Dept: SURGICAL ONCOLOGY | Facility: CLINIC | Age: 67
End: 2022-08-04

## 2022-08-04 VITALS
TEMPERATURE: 98.6 F | HEIGHT: 67 IN | HEART RATE: 72 BPM | BODY MASS INDEX: 27.31 KG/M2 | DIASTOLIC BLOOD PRESSURE: 77 MMHG | WEIGHT: 174 LBS | RESPIRATION RATE: 16 BRPM | OXYGEN SATURATION: 98 % | SYSTOLIC BLOOD PRESSURE: 119 MMHG

## 2022-08-04 PROCEDURE — 99204 OFFICE O/P NEW MOD 45 MIN: CPT

## 2022-08-04 PROCEDURE — 99214 OFFICE O/P EST MOD 30 MIN: CPT

## 2022-08-09 NOTE — CONSULT LETTER
[Dear  ___] : Dear  [unfilled], [Consult Letter:] : I had the pleasure of evaluating your patient, [unfilled]. [( Thank you for referring [unfilled] for consultation for _____ )] : Thank you for referring [unfilled] for consultation for [unfilled] [Please see my note below.] : Please see my note below. [Consult Closing:] : Thank you very much for allowing me to participate in the care of this patient.  If you have any questions, please do not hesitate to contact me. [Sincerely,] : Sincerely, [FreeTextEntry2] : Thomas Sanchez MD [FreeTextEntry3] : Andre Knutson MD\par Surgical Oncology\par [facility]\par Office: [phone]\par Cell: [phone]

## 2022-08-09 NOTE — ASSESSMENT
[FreeTextEntry1] : We discussed the role of a robotic laparoscopic left adrenalectomy and alternatives including close interval observation.  Steven and his family wish to consider resection.  We discussed the risks, benefits and alternatives of the procedure.  We also discussed post operative expectations and possible complications.  I will also arrange for an adrenal protocol CT.\par \par They will contact me if they wish to proceed with scheduling surgery.

## 2022-08-09 NOTE — PHYSICAL EXAM
[Normal] : supple, no neck mass and thyroid not enlarged [Normal Neck Lymph Nodes] : normal neck lymph nodes  [Normal Supraclavicular Lymph Nodes] : normal supraclavicular lymph nodes [Normal Groin Lymph Nodes] : normal groin lymph nodes [Normal Axillary Lymph Nodes] : normal axillary lymph nodes [Normal] : not distended, non tender, no masses, no hepatosplenomegaly

## 2022-08-09 NOTE — HISTORY OF PRESENT ILLNESS
[de-identified] : Mr. JOSÉ MIGUEL YODER is a 66 year old man, referred by his endocrinologist Dr. Thomas Sanchez, for a second opinion on an adrenal mass. \par \par His past medical history is significant for MS, hypertension and pre-diabetes. During an admission to [facility] in February of 2022 for suspected urosepsis, a CT A/P showed a left adrenal adenoma measuring 4.6 cm. He reports that he was diagnosed with this mass around 5 years ago while being worked up for kidney stones. It appears to be relatively stable in size as far back as September of 2017 (on MR of abdomen). \par \par His late-night salivary cortisol levels and a 24-Hour urinary cortisol were normal, his AM serum cortisol did not adequately suppress after low dose DST\par \par He has a family history of rectal cancer in his mother and maternal uncle. Reports past use of tobacco (quit in 1991), social drinker of around 4 drinks a week. He reports a dull ache to his left lower back after sitting for a while but denies any other constitutional symptoms. Last colonoscopy was in 2021 with no reported issues.\par \par He has also seen Dr. Santy De La Fuente  and is seeing me as a second opinion.

## 2022-08-18 ENCOUNTER — OUTPATIENT (OUTPATIENT)
Dept: OUTPATIENT SERVICES | Facility: HOSPITAL | Age: 67
LOS: 1 days | End: 2022-08-18

## 2022-08-18 VITALS
RESPIRATION RATE: 16 BRPM | HEART RATE: 87 BPM | SYSTOLIC BLOOD PRESSURE: 139 MMHG | DIASTOLIC BLOOD PRESSURE: 90 MMHG | WEIGHT: 177.91 LBS | OXYGEN SATURATION: 97 % | TEMPERATURE: 97 F | HEIGHT: 66 IN

## 2022-08-18 DIAGNOSIS — Z90.89 ACQUIRED ABSENCE OF OTHER ORGANS: Chronic | ICD-10-CM

## 2022-08-18 DIAGNOSIS — Z98.890 OTHER SPECIFIED POSTPROCEDURAL STATES: Chronic | ICD-10-CM

## 2022-08-18 DIAGNOSIS — D35.02 BENIGN NEOPLASM OF LEFT ADRENAL GLAND: ICD-10-CM

## 2022-08-18 DIAGNOSIS — G35 MULTIPLE SCLEROSIS: ICD-10-CM

## 2022-08-18 DIAGNOSIS — G47.33 OBSTRUCTIVE SLEEP APNEA (ADULT) (PEDIATRIC): ICD-10-CM

## 2022-08-18 DIAGNOSIS — I10 ESSENTIAL (PRIMARY) HYPERTENSION: ICD-10-CM

## 2022-08-18 LAB
ALBUMIN SERPL ELPH-MCNC: 4.6 G/DL — SIGNIFICANT CHANGE UP (ref 3.3–5)
ALP SERPL-CCNC: 63 U/L — SIGNIFICANT CHANGE UP (ref 40–120)
ALT FLD-CCNC: 15 U/L — SIGNIFICANT CHANGE UP (ref 4–41)
ANION GAP SERPL CALC-SCNC: 12 MMOL/L — SIGNIFICANT CHANGE UP (ref 7–14)
AST SERPL-CCNC: 14 U/L — SIGNIFICANT CHANGE UP (ref 4–40)
BILIRUB SERPL-MCNC: 0.6 MG/DL — SIGNIFICANT CHANGE UP (ref 0.2–1.2)
BLD GP AB SCN SERPL QL: NEGATIVE — SIGNIFICANT CHANGE UP
BUN SERPL-MCNC: 19 MG/DL — SIGNIFICANT CHANGE UP (ref 7–23)
CALCIUM SERPL-MCNC: 9.8 MG/DL — SIGNIFICANT CHANGE UP (ref 8.4–10.5)
CHLORIDE SERPL-SCNC: 100 MMOL/L — SIGNIFICANT CHANGE UP (ref 98–107)
CO2 SERPL-SCNC: 26 MMOL/L — SIGNIFICANT CHANGE UP (ref 22–31)
CREAT SERPL-MCNC: 0.85 MG/DL — SIGNIFICANT CHANGE UP (ref 0.5–1.3)
EGFR: 95 ML/MIN/1.73M2 — SIGNIFICANT CHANGE UP
GLUCOSE SERPL-MCNC: 86 MG/DL — SIGNIFICANT CHANGE UP (ref 70–99)
HCT VFR BLD CALC: 40.7 % — SIGNIFICANT CHANGE UP (ref 39–50)
HGB BLD-MCNC: 12.2 G/DL — LOW (ref 13–17)
MCHC RBC-ENTMCNC: 19.2 PG — LOW (ref 27–34)
MCHC RBC-ENTMCNC: 30 GM/DL — LOW (ref 32–36)
MCV RBC AUTO: 64.2 FL — LOW (ref 80–100)
NRBC # BLD: 0 /100 WBCS — SIGNIFICANT CHANGE UP (ref 0–0)
NRBC # FLD: 0 K/UL — SIGNIFICANT CHANGE UP (ref 0–0)
PLATELET # BLD AUTO: 202 K/UL — SIGNIFICANT CHANGE UP (ref 150–400)
POTASSIUM SERPL-MCNC: 4 MMOL/L — SIGNIFICANT CHANGE UP (ref 3.5–5.3)
POTASSIUM SERPL-SCNC: 4 MMOL/L — SIGNIFICANT CHANGE UP (ref 3.5–5.3)
PROT SERPL-MCNC: 7.1 G/DL — SIGNIFICANT CHANGE UP (ref 6–8.3)
RBC # BLD: 6.34 M/UL — HIGH (ref 4.2–5.8)
RBC # FLD: 18.4 % — HIGH (ref 10.3–14.5)
RH IG SCN BLD-IMP: POSITIVE — SIGNIFICANT CHANGE UP
SODIUM SERPL-SCNC: 138 MMOL/L — SIGNIFICANT CHANGE UP (ref 135–145)
WBC # BLD: 6.06 K/UL — SIGNIFICANT CHANGE UP (ref 3.8–10.5)
WBC # FLD AUTO: 6.06 K/UL — SIGNIFICANT CHANGE UP (ref 3.8–10.5)

## 2022-08-18 PROCEDURE — 93010 ELECTROCARDIOGRAM REPORT: CPT

## 2022-08-18 RX ORDER — SODIUM CHLORIDE 9 MG/ML
1000 INJECTION, SOLUTION INTRAVENOUS
Refills: 0 | Status: DISCONTINUED | OUTPATIENT
Start: 2022-09-07 | End: 2022-09-07

## 2022-08-18 NOTE — H&P PST ADULT - PROBLEM SELECTOR PLAN 2
Patient instructed to take metoprolol losartan and hctz with a sip of water on the morning of procedure.     copy of echo and stress test in chart

## 2022-08-18 NOTE — H&P PST ADULT - HISTORY OF PRESENT ILLNESS
65 y/o male presents to presurgical testing with diagnosis of benign neoplasm of left adrenal gland scheduled for laparoscopic left adrenalectomy. Pt with history of left adrenal mass since 2017, hospitalized in February 2022 for urosepsis with an abnormal CT scan.

## 2022-08-18 NOTE — H&P PST ADULT - NSICDXPASTSURGICALHX_GEN_ALL_CORE_FT
PAST SURGICAL HISTORY:  H/O colonoscopy 2021    H/O shoulder surgery right    H/O uvulectomy     History of prostate surgery rezum procedure 2/2022

## 2022-08-18 NOTE — H&P PST ADULT - NEGATIVE NEUROLOGICAL SYMPTOMS
no weakness/no paresthesias/no generalized seizures/no syncope/no tremors/no difficulty walking/no headache

## 2022-08-18 NOTE — H&P PST ADULT - PRIMARY CARE PROVIDER
Dr Collier -[phone]                                                                                                      Dr García neurologist  [phone] last visit 2019

## 2022-08-18 NOTE — H&P PST ADULT - NSICDXPASTMEDICALHX_GEN_ALL_CORE_FT
PAST MEDICAL HISTORY:  Benign neoplasm of left adrenal gland     BPH (benign prostatic hyperplasia) not requiring medication    Fatty liver     History of prediabetes     HLD (hyperlipidemia)     HTN (hypertension)     Mediterranean anemia     Multiple sclerosis dx 1986, not requiring medication, denies symptoms    KENTON (obstructive sleep apnea) uses a mouth appliance

## 2022-08-18 NOTE — H&P PST ADULT - MALLAMPATI CLASS
ER MD AT BEDSIDE TO EVAL PT WITH ORDERS RECEIVED. WILL CARRY OUT ORDERS. 
STARTED SL 18G TO L WRIST. Class II - visualization of the soft palate, fauces, and uvula

## 2022-08-18 NOTE — H&P PST ADULT - PROBLEM SELECTOR PLAN 1
Patient tentatively scheduled for laparoscopic left adrenalectomy for 9/7/22. Pre-op instructions provided. Pt given verbal and written instructions with teach back on chlorhexidine shampoo and pepcid. Pt verbalized understanding with return demonstration.     Pt has a scheduled preop COVID test.    65 y/o Male PMH HTN HLD prediabetes BPH KENTON MS scheduled for a high risk procedure. Pending medical evaluation.

## 2022-08-24 ENCOUNTER — APPOINTMENT (OUTPATIENT)
Dept: CARDIOLOGY | Facility: CLINIC | Age: 67
End: 2022-08-24

## 2022-08-24 ENCOUNTER — APPOINTMENT (OUTPATIENT)
Dept: INTERNAL MEDICINE | Facility: CLINIC | Age: 67
End: 2022-08-24

## 2022-08-24 ENCOUNTER — NON-APPOINTMENT (OUTPATIENT)
Age: 67
End: 2022-08-24

## 2022-08-24 VITALS
WEIGHT: 179 LBS | HEART RATE: 93 BPM | HEIGHT: 67 IN | RESPIRATION RATE: 16 BRPM | SYSTOLIC BLOOD PRESSURE: 128 MMHG | OXYGEN SATURATION: 98 % | DIASTOLIC BLOOD PRESSURE: 85 MMHG | TEMPERATURE: 96.6 F | BODY MASS INDEX: 28.09 KG/M2

## 2022-08-24 DIAGNOSIS — Z01.818 ENCOUNTER FOR OTHER PREPROCEDURAL EXAMINATION: ICD-10-CM

## 2022-08-24 PROBLEM — G35 MULTIPLE SCLEROSIS: Chronic | Status: ACTIVE | Noted: 2022-08-18

## 2022-08-24 PROBLEM — D35.02 BENIGN NEOPLASM OF LEFT ADRENAL GLAND: Chronic | Status: ACTIVE | Noted: 2022-08-18

## 2022-08-24 PROBLEM — Z87.898 PERSONAL HISTORY OF OTHER SPECIFIED CONDITIONS: Chronic | Status: ACTIVE | Noted: 2022-08-18

## 2022-08-24 PROBLEM — G47.33 OBSTRUCTIVE SLEEP APNEA (ADULT) (PEDIATRIC): Chronic | Status: ACTIVE | Noted: 2022-08-18

## 2022-08-24 PROBLEM — K76.0 FATTY (CHANGE OF) LIVER, NOT ELSEWHERE CLASSIFIED: Chronic | Status: ACTIVE | Noted: 2022-08-18

## 2022-08-24 PROBLEM — D56.9 THALASSEMIA, UNSPECIFIED: Chronic | Status: ACTIVE | Noted: 2022-08-18

## 2022-08-24 PROBLEM — N40.0 BENIGN PROSTATIC HYPERPLASIA WITHOUT LOWER URINARY TRACT SYMPTOMS: Chronic | Status: ACTIVE | Noted: 2022-02-03

## 2022-08-24 PROCEDURE — 99213 OFFICE O/P EST LOW 20 MIN: CPT | Mod: 25

## 2022-08-24 PROCEDURE — 99214 OFFICE O/P EST MOD 30 MIN: CPT

## 2022-08-24 PROCEDURE — 93000 ELECTROCARDIOGRAM COMPLETE: CPT

## 2022-08-24 RX ORDER — TAMSULOSIN HYDROCHLORIDE 0.4 MG/1
0.4 CAPSULE ORAL
Refills: 0 | Status: DISCONTINUED | COMMUNITY
End: 2022-08-24

## 2022-08-24 RX ORDER — DEXAMETHASONE 1 MG/1
1 TABLET ORAL
Qty: 1 | Refills: 0 | Status: DISCONTINUED | COMMUNITY
Start: 2022-06-06 | End: 2022-08-24

## 2022-08-24 NOTE — CARDIOLOGY SUMMARY
[___] : [unfilled] [No Ischemia] : no Ischemia [No Exercise Ind Arr] : no exercise induced arrhythmias [LVEF ___%] : LVEF [unfilled]% [None] : normal LV function

## 2022-08-24 NOTE — HISTORY OF PRESENT ILLNESS
[Preoperative Visit] : for a medical evaluation prior to surgery [Scheduled Procedure ___] : a [unfilled] [Date of Surgery ___] : on [unfilled] [Surgeon Name ___] : surgeon: [unfilled] [FreeTextEntry1] : Steven went for PST and told of abnormal ECG. He feels well with no CP, palptiations or SOB.

## 2022-08-24 NOTE — DISCUSSION/SUMMARY
[Procedure Intermediate Risk] : the procedure risk is intermediate [Patient Intermediate Risk] : the patient is an intermediate risk [Optimized for Surgery] : the patient is optimized for surgery [FreeTextEntry1] : The patient is a 66-year-old gentleman MS, HTN, recently diagnosed with adrenal mass and abn ECG.\par #1 CV- ECG unchanged with inc RBBB, echo normal  and stress test negative\par #2 Htn- c/w losartan and HCTZ\par #3 Tachycardia- c/w toprol 25mg\par #4 MS- not on medication secondary to cost, does walk with cane for precaution but can walk.\par #5 General- There are no cardiac contraindications to proceeding with adrenal surgery as planned. No further testing required.

## 2022-08-24 NOTE — REVIEW OF SYSTEMS
[SOB] : no shortness of breath [Dyspnea on exertion] : not dyspnea during exertion [Chest Discomfort] : no chest discomfort [Palpitations] : no palpitations [Negative] : Heme/Lymph

## 2022-08-24 NOTE — PHYSICAL EXAM
[General Appearance - Well Developed] : well developed [Normal Appearance] : normal appearance [Well Groomed] : well groomed [General Appearance - Well Nourished] : well nourished [No Deformities] : no deformities [General Appearance - In No Acute Distress] : no acute distress [Normal Conjunctiva] : the conjunctiva exhibited no abnormalities [Eyelids - No Xanthelasma] : the eyelids demonstrated no xanthelasmas [Normal Oral Mucosa] : normal oral mucosa [No Oral Pallor] : no oral pallor [No Oral Cyanosis] : no oral cyanosis [Normal Jugular Venous A Waves Present] : normal jugular venous A waves present [Normal Jugular Venous V Waves Present] : normal jugular venous V waves present [No Jugular Venous Toussaint A Waves] : no jugular venous toussaint A waves [Respiration, Rhythm And Depth] : normal respiratory rhythm and effort [Exaggerated Use Of Accessory Muscles For Inspiration] : no accessory muscle use [Auscultation Breath Sounds / Voice Sounds] : lungs were clear to auscultation bilaterally [Heart Rate And Rhythm] : heart rate and rhythm were normal [Heart Sounds] : normal S1 and S2 [Murmurs] : no murmurs present [Abdomen Soft] : soft [Abdomen Tenderness] : non-tender [Abdomen Mass (___ Cm)] : no abdominal mass palpated [Abnormal Walk] : normal gait [Gait - Sufficient For Exercise Testing] : the gait was sufficient for exercise testing [Nail Clubbing] : no clubbing of the fingernails [Cyanosis, Localized] : no localized cyanosis [Petechial Hemorrhages (___cm)] : no petechial hemorrhages [Skin Color & Pigmentation] : normal skin color and pigmentation [] : no rash [No Venous Stasis] : no venous stasis [Skin Lesions] : no skin lesions [No Skin Ulcers] : no skin ulcer [No Xanthoma] : no  xanthoma was observed [Oriented To Time, Place, And Person] : oriented to person, place, and time [Affect] : the affect was normal [Mood] : the mood was normal [No Anxiety] : not feeling anxious

## 2022-08-31 ENCOUNTER — APPOINTMENT (OUTPATIENT)
Dept: SURGICAL ONCOLOGY | Facility: HOSPITAL | Age: 67
End: 2022-08-31

## 2022-09-06 ENCOUNTER — TRANSCRIPTION ENCOUNTER (OUTPATIENT)
Age: 67
End: 2022-09-06

## 2022-09-07 ENCOUNTER — NON-APPOINTMENT (OUTPATIENT)
Age: 67
End: 2022-09-07

## 2022-09-07 ENCOUNTER — TRANSCRIPTION ENCOUNTER (OUTPATIENT)
Age: 67
End: 2022-09-07

## 2022-09-07 ENCOUNTER — INPATIENT (INPATIENT)
Facility: HOSPITAL | Age: 67
LOS: 1 days | Discharge: HOME | End: 2022-09-09
Attending: SURGERY | Admitting: SURGERY

## 2022-09-07 ENCOUNTER — RESULT REVIEW (OUTPATIENT)
Age: 67
End: 2022-09-07

## 2022-09-07 ENCOUNTER — APPOINTMENT (OUTPATIENT)
Dept: SURGERY | Facility: HOSPITAL | Age: 67
End: 2022-09-07

## 2022-09-07 VITALS
SYSTOLIC BLOOD PRESSURE: 125 MMHG | DIASTOLIC BLOOD PRESSURE: 80 MMHG | WEIGHT: 177.91 LBS | RESPIRATION RATE: 18 BRPM | HEIGHT: 66 IN | TEMPERATURE: 98 F | OXYGEN SATURATION: 97 % | HEART RATE: 93 BPM

## 2022-09-07 DIAGNOSIS — Z98.890 OTHER SPECIFIED POSTPROCEDURAL STATES: Chronic | ICD-10-CM

## 2022-09-07 DIAGNOSIS — D35.02 BENIGN NEOPLASM OF LEFT ADRENAL GLAND: ICD-10-CM

## 2022-09-07 DIAGNOSIS — Z90.89 ACQUIRED ABSENCE OF OTHER ORGANS: Chronic | ICD-10-CM

## 2022-09-07 LAB — RH IG SCN BLD-IMP: POSITIVE — SIGNIFICANT CHANGE UP

## 2022-09-07 PROCEDURE — 49203: CPT

## 2022-09-07 PROCEDURE — 88307 TISSUE EXAM BY PATHOLOGIST: CPT | Mod: 26

## 2022-09-07 PROCEDURE — 60650 LAPAROSCOPY ADRENALECTOMY: CPT

## 2022-09-07 PROCEDURE — 88360 TUMOR IMMUNOHISTOCHEM/MANUAL: CPT | Mod: 26

## 2022-09-07 DEVICE — VISTASEAL FIBRIN HUMAN 10ML: Type: IMPLANTABLE DEVICE | Site: LEFT | Status: FUNCTIONAL

## 2022-09-07 DEVICE — LIGATING CLIPS WECK HEMOLOK POLYMER MEDIUM-LARGE (GREEN) 6: Type: IMPLANTABLE DEVICE | Site: LEFT | Status: FUNCTIONAL

## 2022-09-07 RX ORDER — FENTANYL CITRATE 50 UG/ML
25 INJECTION INTRAVENOUS
Refills: 0 | Status: DISCONTINUED | OUTPATIENT
Start: 2022-09-07 | End: 2022-09-07

## 2022-09-07 RX ORDER — OXYCODONE HYDROCHLORIDE 5 MG/1
5 TABLET ORAL EVERY 6 HOURS
Refills: 0 | Status: DISCONTINUED | OUTPATIENT
Start: 2022-09-07 | End: 2022-09-09

## 2022-09-07 RX ORDER — HEPARIN SODIUM 5000 [USP'U]/ML
5000 INJECTION INTRAVENOUS; SUBCUTANEOUS EVERY 12 HOURS
Refills: 0 | Status: DISCONTINUED | OUTPATIENT
Start: 2022-09-07 | End: 2022-09-09

## 2022-09-07 RX ORDER — HYDROMORPHONE HYDROCHLORIDE 2 MG/ML
0.5 INJECTION INTRAMUSCULAR; INTRAVENOUS; SUBCUTANEOUS
Refills: 0 | Status: DISCONTINUED | OUTPATIENT
Start: 2022-09-07 | End: 2022-09-07

## 2022-09-07 RX ORDER — SODIUM CHLORIDE 9 MG/ML
1000 INJECTION, SOLUTION INTRAVENOUS
Refills: 0 | Status: DISCONTINUED | OUTPATIENT
Start: 2022-09-07 | End: 2022-09-08

## 2022-09-07 RX ORDER — ACETAMINOPHEN 500 MG
650 TABLET ORAL EVERY 6 HOURS
Refills: 0 | Status: DISCONTINUED | OUTPATIENT
Start: 2022-09-07 | End: 2022-09-09

## 2022-09-07 RX ORDER — METOPROLOL TARTRATE 50 MG
25 TABLET ORAL DAILY
Refills: 0 | Status: DISCONTINUED | OUTPATIENT
Start: 2022-09-07 | End: 2022-09-09

## 2022-09-07 RX ORDER — HYDROCHLOROTHIAZIDE 25 MG
25 TABLET ORAL DAILY
Refills: 0 | Status: DISCONTINUED | OUTPATIENT
Start: 2022-09-07 | End: 2022-09-09

## 2022-09-07 RX ORDER — ENOXAPARIN SODIUM 100 MG/ML
40 INJECTION SUBCUTANEOUS EVERY 24 HOURS
Refills: 0 | Status: DISCONTINUED | OUTPATIENT
Start: 2022-09-07 | End: 2022-09-07

## 2022-09-07 RX ORDER — ONDANSETRON 8 MG/1
4 TABLET, FILM COATED ORAL ONCE
Refills: 0 | Status: DISCONTINUED | OUTPATIENT
Start: 2022-09-07 | End: 2022-09-07

## 2022-09-07 RX ORDER — LOSARTAN POTASSIUM 100 MG/1
100 TABLET, FILM COATED ORAL DAILY
Refills: 0 | Status: DISCONTINUED | OUTPATIENT
Start: 2022-09-08 | End: 2022-09-09

## 2022-09-07 RX ADMIN — Medication 650 MILLIGRAM(S): at 17:22

## 2022-09-07 RX ADMIN — HYDROMORPHONE HYDROCHLORIDE 0.5 MILLIGRAM(S): 2 INJECTION INTRAMUSCULAR; INTRAVENOUS; SUBCUTANEOUS at 13:22

## 2022-09-07 RX ADMIN — SODIUM CHLORIDE 75 MILLILITER(S): 9 INJECTION, SOLUTION INTRAVENOUS at 16:02

## 2022-09-07 RX ADMIN — OXYCODONE HYDROCHLORIDE 5 MILLIGRAM(S): 5 TABLET ORAL at 16:11

## 2022-09-07 RX ADMIN — HEPARIN SODIUM 5000 UNIT(S): 5000 INJECTION INTRAVENOUS; SUBCUTANEOUS at 22:38

## 2022-09-07 RX ADMIN — HYDROMORPHONE HYDROCHLORIDE 0.5 MILLIGRAM(S): 2 INJECTION INTRAMUSCULAR; INTRAVENOUS; SUBCUTANEOUS at 13:47

## 2022-09-07 RX ADMIN — Medication 650 MILLIGRAM(S): at 17:49

## 2022-09-07 RX ADMIN — OXYCODONE HYDROCHLORIDE 5 MILLIGRAM(S): 5 TABLET ORAL at 15:41

## 2022-09-07 NOTE — PATIENT PROFILE ADULT - FALL HARM RISK - UNIVERSAL INTERVENTIONS
Bed in lowest position, wheels locked, appropriate side rails in place/Call bell, personal items and telephone in reach/Instruct patient to call for assistance before getting out of bed or chair/Non-slip footwear when patient is out of bed/[place] to call system/Physically safe environment - no spills, clutter or unnecessary equipment/Purposeful Proactive Rounding/Room/bathroom lighting operational, light cord in reach

## 2022-09-07 NOTE — ASU PREOP CHECKLIST - HEIGHT IN FEET
Primary Care/Behavioral Health Integration - Program Introduction    Patient referred to behavioral health by her primary care physician, Melissa Agustin,  for concerns related to coping skills related to a stressful family/home situation. Attempted to contact patient to discuss the integration of behavioral health services into the primary care clinic and the provision of care coordination and/or short-term, brief interventions to improve overall health and functioning. Left a brief message with contact information and requested a callback at her earliest convenience. Also sent a letter to introduce services.     1:50pm - Patient called back to speak with writer.  Appointment scheduled with Writer only, patient opted not to see Dr. Cardona at this time.  Patient is schedule for Thursday 1/24 at 9am with writer.  Letter drafted as noted above but was discarded after speaking with patient.   
5

## 2022-09-07 NOTE — PLAN
[FreeTextEntry1] : 66 years old male medically stable for planned procedure.\par Hypertension: Continue losartan/hydrochlorothiazide/Toprol\par Labs and EKG reviewed\par EKG right bundle branch block, old\par Patient was seen and cleared by cardiologist.

## 2022-09-07 NOTE — DISCHARGE NOTE PROVIDER - NSDCCPCAREPLAN_GEN_ALL_CORE_FT
PRINCIPAL DISCHARGE DIAGNOSIS  Diagnosis: Left adrenal mass  Assessment and Plan of Treatment: Laparoscopic left adrenalectomy 9/7  WOUND CARE:  Please keep incisions clean and dry. Please do not Scrub or rub incisions. DO NOT use lotion or powder on incisions.   BATHING: You may shower and/or sponge bathe. You may use warm soapy water in the shower and rinse, pat dry. NO baths no pools for 6 weeks.  ACTIVITY: No heavy lifting or straining. Otherwise, you may return to your usual level of physical activity.   Take Tylenol 500mg every 6 hours as needed for mild to moderate pain if your pain continues take oxycodone 5 mg. If you are taking narcotic pain (oxycodone) medication DO NOT drive a car, operate machinery or make important decisions.  DIET: Return to your usual diet.  NOTIFY YOUR SURGEON IF YOU HAVE: any bleeding that does not stop, any pus draining from your wound(s), any fever (over 100.4 F) persistent nausea/vomiting, or if your pain is not controlled on your discharge pain medications, unable to urinate.  Please follow up with your primary care physician in one week regarding your hospitalization, bring copies of your discharge paperwork.  Please follow up with your surgeon, Dr. De La Fuente       PRINCIPAL DISCHARGE DIAGNOSIS  Diagnosis: Left adrenal mass  Assessment and Plan of Treatment: Laparoscopic left adrenalectomy 9/7  WOUND CARE:  Please keep incisions clean and dry. Please do not Scrub or rub incisions. DO NOT use lotion or powder on incisions.   BATHING: You may shower and/or sponge bathe. You may use warm soapy water in the shower and rinse, pat dry. NO baths no pools for 6 weeks.  ACTIVITY: No heavy lifting or straining. Otherwise, you may return to your usual level of physical activity.   Take Tylenol 500mg every 6 hours as needed for mild to moderate pain if your pain continues take oxycodone 5 mg. If you are taking narcotic pain (oxycodone) medication DO NOT drive a car, operate machinery or make important decisions.  DIET: Return to your usual diet.  NOTIFY YOUR SURGEON IF YOU HAVE: any bleeding that does not stop, any pus draining from your wound(s), any fever (over 100.4 F) persistent nausea/vomiting, or if your pain is not controlled on your discharge pain medications, unable to urinate.  Please follow up with your primary care physician in one week regarding your hospitalization, bring copies of your discharge paperwork.  Please follow up with your surgeon, Dr. De La Fuente      SECONDARY DISCHARGE DIAGNOSES  Diagnosis: Traumatic hematuria  Assessment and Plan of Treatment: You will be discharged with a louise catheter. Please follow up with your outpatient urologist, Dr. Valenzuela, in one week for possible removal. Please take one week of Keflex antibiotics. P

## 2022-09-07 NOTE — PHYSICAL EXAM
[No Acute Distress] : no acute distress [Well Nourished] : well nourished [Well Developed] : well developed [Well-Appearing] : well-appearing [Normal Sclera/Conjunctiva] : normal sclera/conjunctiva [PERRL] : pupils equal round and reactive to light [EOMI] : extraocular movements intact [Normal Outer Ear/Nose] : the outer ears and nose were normal in appearance [Normal Oropharynx] : the oropharynx was normal [Normal TMs] : both tympanic membranes were normal [No JVD] : no jugular venous distention [No Lymphadenopathy] : no lymphadenopathy [Supple] : supple [No Respiratory Distress] : no respiratory distress  [No Accessory Muscle Use] : no accessory muscle use [Clear to Auscultation] : lungs were clear to auscultation bilaterally [Normal Rate] : normal rate  [Regular Rhythm] : with a regular rhythm [Normal S1, S2] : normal S1 and S2 [No Murmur] : no murmur heard [Pedal Pulses Present] : the pedal pulses are present [No Edema] : there was no peripheral edema [Soft] : abdomen soft [Non Tender] : non-tender [Non-distended] : non-distended [No Masses] : no abdominal mass palpated [No HSM] : no HSM [Normal Bowel Sounds] : normal bowel sounds [Normal Posterior Cervical Nodes] : no posterior cervical lymphadenopathy [Normal Anterior Cervical Nodes] : no anterior cervical lymphadenopathy [No CVA Tenderness] : no CVA  tenderness [No Spinal Tenderness] : no spinal tenderness [No Joint Swelling] : no joint swelling [Grossly Normal Strength/Tone] : grossly normal strength/tone [No Rash] : no rash [No Focal Deficits] : no focal deficits [Normal Gait] : normal gait [Deep Tendon Reflexes (DTR)] : deep tendon reflexes were 2+ and symmetric [Normal Affect] : the affect was normal [Normal Insight/Judgement] : insight and judgment were intact

## 2022-09-07 NOTE — DISCHARGE NOTE PROVIDER - NSDCMRMEDTOKEN_GEN_ALL_CORE_FT
hydroCHLOROthiazide 25 mg oral tablet: 1 tab(s) orally once a day  losartan 100 mg oral tablet: 1 tab(s) orally once a day  Metoprolol Succinate ER 25 mg oral tablet, extended release: 1 tab(s) orally once a day  Ocuvite oral tablet: 1 tab(s) orally once a day LD 8/31/2022  Vitamin B-12: 1 tab(s) orally once a day  Vitamin C: 1 tab(s) orally once a day  vitamin D: 1 tab(s) orally once a day   hydroCHLOROthiazide 25 mg oral tablet: 1 tab(s) orally once a day  losartan 100 mg oral tablet: 1 tab(s) orally once a day  Metoprolol Succinate ER 25 mg oral tablet, extended release: 1 tab(s) orally once a day  Ocuvite oral tablet: 1 tab(s) orally once a day LD 8/31/2022  oxyCODONE 5 mg oral tablet: 1 tab(s) orally 2 times a day MDD:2 tabs/ day  Vitamin B-12: 1 tab(s) orally once a day  Vitamin C: 1 tab(s) orally once a day  vitamin D: 1 tab(s) orally once a day   finasteride 5 mg oral tablet: 1 tab(s) orally once a day   Flomax 0.4 mg oral capsule: 1 cap(s) orally once a day   hydroCHLOROthiazide 25 mg oral tablet: 1 tab(s) orally once a day  losartan 100 mg oral tablet: 1 tab(s) orally once a day  Metoprolol Succinate ER 25 mg oral tablet, extended release: 1 tab(s) orally once a day  Ocuvite oral tablet: 1 tab(s) orally once a day LD 8/31/2022  oxyCODONE 5 mg oral tablet: 1 tab(s) orally 2 times a day MDD:2 tabs/ day  Vitamin B-12: 1 tab(s) orally once a day  Vitamin C: 1 tab(s) orally once a day  vitamin D: 1 tab(s) orally once a day   cephalexin 500 mg oral tablet: 1 tab(s) orally every 6 hours   finasteride 5 mg oral tablet: 1 tab(s) orally once a day   Flomax 0.4 mg oral capsule: 1 cap(s) orally once a day   hydroCHLOROthiazide 25 mg oral tablet: 1 tab(s) orally once a day  losartan 100 mg oral tablet: 1 tab(s) orally once a day  Metoprolol Succinate ER 25 mg oral tablet, extended release: 1 tab(s) orally once a day  Ocuvite oral tablet: 1 tab(s) orally once a day LD 8/31/2022  oxyCODONE 5 mg oral tablet: 1 tab(s) orally 2 times a day MDD:2 tabs/ day  Vitamin B-12: 1 tab(s) orally once a day  Vitamin C: 1 tab(s) orally once a day  vitamin D: 1 tab(s) orally once a day

## 2022-09-07 NOTE — DISCHARGE NOTE PROVIDER - CARE PROVIDER_API CALL
Santy De La Fuente  General Surgery  [address] [place] [address]  Phone: [phone]  Fax: [phone]  Follow Up Time: 1 week

## 2022-09-07 NOTE — CHART NOTE - NSCHARTNOTEFT_GEN_A_CORE
Surgery Post-Op Note    Pre-Op Dx: Left Adrenal Mass   Procedure: Left adrenalectomy    SUBJECTIVE:  Pt seen and examined at the bedside. Pt w/ no complaints. Denies F/C/N/V. Pain controlled with medication. Palencia in place    OBJECTIVE:  Vital Signs Last 24 Hrs  T(C): 36.4 (07 Sep 2022 12:55), Max: 36.7 (07 Sep 2022 08:13)  T(F): 97.6 (07 Sep 2022 12:55), Max: 98.1 (07 Sep 2022 08:13)  HR: 87 (07 Sep 2022 14:15) (83 - 93)  BP: 132/77 (07 Sep 2022 14:15) (125/80 - 150/84)  BP(mean): 89 (07 Sep 2022 14:15) (86 - 115)  RR: 12 (07 Sep 2022 14:15) (11 - 19)  SpO2: 97% (07 Sep 2022 14:15) (92% - 100%)    Parameters below as of 07 Sep 2022 14:00  Patient On (Oxygen Delivery Method): room air        Physical Exam:  General: NAD, resting comfortably in bed  Neuro: A/O x 3, no focal deficits  Pulmonary: Nonlabored breathing, no respiratory distress  Cardiovascular: NSR  Abdominal: soft, ATTP. ND  Incision: C/D/I   Extremities: WWP    LABS:            CAPILLARY BLOOD GLUCOSE            ABO Interpretation: O (09-07 @ 08:16)      IMAGING:    ASSESSMENT:66y Male now 4 hours s/p left adrenalectomy     PLAN:  - Pain control Tylenol, Oxy PRN   - Encourage IS  - Nausea control PRN  - Monitor vitals  - Diet: Advance as tolerated  - Palencia in place, D/C at midnight   - Monitor I+Os  - OOB/ Ambulate  - Home meds   - DVT ppx: SubQ Heparin    Team D Surgery  [id number]

## 2022-09-07 NOTE — DISCHARGE NOTE PROVIDER - NSDCFUSCHEDAPPT_GEN_ALL_CORE_FT
Santy De La Fuente  [facility]  [address]  Scheduled Appointment: 09/19/2022

## 2022-09-07 NOTE — DISCHARGE NOTE PROVIDER - HOSPITAL COURSE
Patient presented to [facility] for schedule surgery with Dr De La Fuente, he is s/p Laparoscopic left adrenalectomy 9/7. Post op he had a louise, Patient presented to [facility] for schedule surgery with Dr De La Fuente, he is s/p Laparoscopic left adrenalectomy 9/7. Post op he had a louise, passed TOV. Patient's diet was advanced to regular as tolerated. At the time of discharge, the patient was hemodynamically stable, was tolerating PO diet, was voiding urine and passing stool, was ambulating, and was comfortable with adequate pain control.

## 2022-09-07 NOTE — CHART NOTE - NSCHARTNOTEFT_GEN_A_CORE
Urology was called due to louise catheter not draining. Catheter was irrigated with normal saline and minimal urine drainage was returned. The catheter balloon was then taken down and the louise catheter was advanced with immediate return of clear red urine. Catheter balloon was reinflated and catheter was easily flushed. Recommend keep louise catheter in overnight and TOV in the AM.     Please reach out to urology if catheter is not draining.     Urology   [id number]

## 2022-09-07 NOTE — HISTORY OF PRESENT ILLNESS
[FreeTextEntry1] : robotic laparoscopic left adrenalectomy   [FreeTextEntry2] : 9/7/22 [FreeTextEntry3] : Dr. De La Fuente [FreeTextEntry4] : 66 years old male with history  MS/ hypertension/hyperlipidemia/BPH/left adrenal  mass presents for medical clearance prior to left  adrenalectomy.\par Patient has good functional capacity.  He denies chest pain, shortness of breath , dizziness

## 2022-09-08 LAB
ANION GAP SERPL CALC-SCNC: 11 MMOL/L — SIGNIFICANT CHANGE UP (ref 7–14)
BASOPHILS # BLD AUTO: 0 K/UL — SIGNIFICANT CHANGE UP (ref 0–0.2)
BASOPHILS NFR BLD AUTO: 0 % — SIGNIFICANT CHANGE UP (ref 0–2)
BLD GP AB SCN SERPL QL: NEGATIVE — SIGNIFICANT CHANGE UP
BUN SERPL-MCNC: 15 MG/DL — SIGNIFICANT CHANGE UP (ref 7–23)
CALCIUM SERPL-MCNC: 9.3 MG/DL — SIGNIFICANT CHANGE UP (ref 8.4–10.5)
CHLORIDE SERPL-SCNC: 102 MMOL/L — SIGNIFICANT CHANGE UP (ref 98–107)
CO2 SERPL-SCNC: 26 MMOL/L — SIGNIFICANT CHANGE UP (ref 22–31)
CREAT SERPL-MCNC: 0.88 MG/DL — SIGNIFICANT CHANGE UP (ref 0.5–1.3)
EGFR: 94 ML/MIN/1.73M2 — SIGNIFICANT CHANGE UP
EOSINOPHIL # BLD AUTO: 0 K/UL — SIGNIFICANT CHANGE UP (ref 0–0.5)
EOSINOPHIL NFR BLD AUTO: 0 % — SIGNIFICANT CHANGE UP (ref 0–6)
GLUCOSE SERPL-MCNC: 107 MG/DL — HIGH (ref 70–99)
HCT VFR BLD CALC: 33.5 % — LOW (ref 39–50)
HCT VFR BLD CALC: 34.2 % — LOW (ref 39–50)
HGB BLD-MCNC: 10.6 G/DL — LOW (ref 13–17)
HGB BLD-MCNC: 10.7 G/DL — LOW (ref 13–17)
IANC: 5.52 K/UL — SIGNIFICANT CHANGE UP (ref 1.8–7.4)
LYMPHOCYTES # BLD AUTO: 1.64 K/UL — SIGNIFICANT CHANGE UP (ref 1–3.3)
LYMPHOCYTES # BLD AUTO: 20 % — SIGNIFICANT CHANGE UP (ref 13–44)
MAGNESIUM SERPL-MCNC: 1.9 MG/DL — SIGNIFICANT CHANGE UP (ref 1.6–2.6)
MCHC RBC-ENTMCNC: 19.7 PG — LOW (ref 27–34)
MCHC RBC-ENTMCNC: 20 PG — LOW (ref 27–34)
MCHC RBC-ENTMCNC: 31.3 GM/DL — LOW (ref 32–36)
MCHC RBC-ENTMCNC: 31.6 GM/DL — LOW (ref 32–36)
MCV RBC AUTO: 62.9 FL — LOW (ref 80–100)
MCV RBC AUTO: 63.2 FL — LOW (ref 80–100)
MONOCYTES # BLD AUTO: 0.29 K/UL — SIGNIFICANT CHANGE UP (ref 0–0.9)
MONOCYTES NFR BLD AUTO: 3.5 % — SIGNIFICANT CHANGE UP (ref 2–14)
NEUTROPHILS # BLD AUTO: 6.12 K/UL — SIGNIFICANT CHANGE UP (ref 1.8–7.4)
NEUTROPHILS NFR BLD AUTO: 74.8 % — SIGNIFICANT CHANGE UP (ref 43–77)
NRBC # BLD: 0 /100 WBCS — SIGNIFICANT CHANGE UP (ref 0–0)
NRBC # FLD: 0 K/UL — SIGNIFICANT CHANGE UP (ref 0–0)
PHOSPHATE SERPL-MCNC: 1.8 MG/DL — LOW (ref 2.5–4.5)
PLATELET # BLD AUTO: 169 K/UL — SIGNIFICANT CHANGE UP (ref 150–400)
PLATELET # BLD AUTO: 189 K/UL — SIGNIFICANT CHANGE UP (ref 150–400)
POTASSIUM SERPL-MCNC: 3.8 MMOL/L — SIGNIFICANT CHANGE UP (ref 3.5–5.3)
POTASSIUM SERPL-SCNC: 3.8 MMOL/L — SIGNIFICANT CHANGE UP (ref 3.5–5.3)
RBC # BLD: 5.3 M/UL — SIGNIFICANT CHANGE UP (ref 4.2–5.8)
RBC # BLD: 5.44 M/UL — SIGNIFICANT CHANGE UP (ref 4.2–5.8)
RBC # FLD: 16.5 % — HIGH (ref 10.3–14.5)
RBC # FLD: 17.5 % — HIGH (ref 10.3–14.5)
RH IG SCN BLD-IMP: POSITIVE — SIGNIFICANT CHANGE UP
SODIUM SERPL-SCNC: 139 MMOL/L — SIGNIFICANT CHANGE UP (ref 135–145)
WBC # BLD: 8.18 K/UL — SIGNIFICANT CHANGE UP (ref 3.8–10.5)
WBC # BLD: 8.73 K/UL — SIGNIFICANT CHANGE UP (ref 3.8–10.5)
WBC # FLD AUTO: 8.18 K/UL — SIGNIFICANT CHANGE UP (ref 3.8–10.5)
WBC # FLD AUTO: 8.73 K/UL — SIGNIFICANT CHANGE UP (ref 3.8–10.5)

## 2022-09-08 RX ORDER — SODIUM,POTASSIUM PHOSPHATES 278-250MG
1 POWDER IN PACKET (EA) ORAL
Refills: 0 | Status: DISCONTINUED | OUTPATIENT
Start: 2022-09-08 | End: 2022-09-08

## 2022-09-08 RX ORDER — POTASSIUM PHOSPHATE, MONOBASIC POTASSIUM PHOSPHATE, DIBASIC 236; 224 MG/ML; MG/ML
30 INJECTION, SOLUTION INTRAVENOUS ONCE
Refills: 0 | Status: DISCONTINUED | OUTPATIENT
Start: 2022-09-08 | End: 2022-09-08

## 2022-09-08 RX ORDER — SODIUM,POTASSIUM PHOSPHATES 278-250MG
1 POWDER IN PACKET (EA) ORAL ONCE
Refills: 0 | Status: COMPLETED | OUTPATIENT
Start: 2022-09-08 | End: 2022-09-08

## 2022-09-08 RX ORDER — OXYCODONE HYDROCHLORIDE 5 MG/1
1 TABLET ORAL
Qty: 6 | Refills: 0
Start: 2022-09-08 | End: 2022-09-10

## 2022-09-08 RX ORDER — CEFAZOLIN SODIUM 1 G
2000 VIAL (EA) INJECTION ONCE
Refills: 0 | Status: COMPLETED | OUTPATIENT
Start: 2022-09-08 | End: 2022-09-08

## 2022-09-08 RX ADMIN — Medication 25 MILLIGRAM(S): at 06:21

## 2022-09-08 RX ADMIN — LOSARTAN POTASSIUM 100 MILLIGRAM(S): 100 TABLET, FILM COATED ORAL at 06:21

## 2022-09-08 RX ADMIN — HEPARIN SODIUM 5000 UNIT(S): 5000 INJECTION INTRAVENOUS; SUBCUTANEOUS at 10:34

## 2022-09-08 RX ADMIN — Medication 650 MILLIGRAM(S): at 12:26

## 2022-09-08 RX ADMIN — Medication 100 MILLIGRAM(S): at 23:32

## 2022-09-08 RX ADMIN — HEPARIN SODIUM 5000 UNIT(S): 5000 INJECTION INTRAVENOUS; SUBCUTANEOUS at 23:03

## 2022-09-08 RX ADMIN — Medication 650 MILLIGRAM(S): at 12:55

## 2022-09-08 RX ADMIN — Medication 650 MILLIGRAM(S): at 18:11

## 2022-09-08 RX ADMIN — Medication 1 PACKET(S): at 15:56

## 2022-09-08 RX ADMIN — Medication 650 MILLIGRAM(S): at 06:51

## 2022-09-08 RX ADMIN — Medication 650 MILLIGRAM(S): at 06:21

## 2022-09-08 RX ADMIN — SODIUM CHLORIDE 75 MILLILITER(S): 9 INJECTION, SOLUTION INTRAVENOUS at 03:46

## 2022-09-08 NOTE — CHART NOTE - NSCHARTNOTEFT_GEN_A_CORE
Patient had traumatic louise placement and Urology was consulted intraoperatively. Still persistently having hematuria. Primary team paged urology earlier today and were told patient is ok to go home with outpatient follow up with his own urologist. Patient now refusing to leave hospital until he speaks to urology team. Urology was paged again and informed. Patient had traumatic louise placement and Urology was consulted intraoperatively. Still persistently having hematuria. Primary team paged urology earlier today and were told patient is ok to go home with outpatient follow up with his own urologist. Patient now refusing to leave hospital until he speaks to urology team. Urology was paged again and Dr. Robb Gomes of urology informed.

## 2022-09-08 NOTE — PROGRESS NOTE ADULT - NSPROGADDITIONALINFOA_GEN_ALL_CORE
.  .  Attending Statement    Case d/w the surgical resident.  The patient states he feels well and that his pain is adequately controlled.  He is now voiding without difficulty.  Labs reviewed and H/H stable.  He is stable for discharge home today.  Post-op instructions were reviewed with the patient.

## 2022-09-08 NOTE — PROVIDER CONTACT NOTE (OTHER) - SITUATION
Pt refusing potassium phos ivpb, because he wants to be discharged today. pt states he will eat foods high in phosphorous at home. RN has not received IVPB from pharmacy yet, so unable to start it.

## 2022-09-08 NOTE — CONSULT NOTE ADULT - SUBJECTIVE AND OBJECTIVE BOX
HPI  67M s/p L la adrenalectomy yesterday.   Intraop noted to have poorly draining Palencia. Palencia noted to be in prostate, balloon deflated and readvanced in correct position.   Given TOV this AM which passed. Intermittent gross hematuria. Now urology consulted for difficulty w/ urination  Pt has hx of BPH s/p Rezum earlier in 2022 w/ Dr Awad. Rezume was c/b urosepsis in Feb 2022.   Is not currently on flomax.   Pt was not having issues with hematuria or urinary urgency/frequency/weak stream prior to admission    PAST MEDICAL & SURGICAL HISTORY:  HTN (hypertension)      HLD (hyperlipidemia)      BPH (benign prostatic hyperplasia)  not requiring medication      Fatty liver      Benign neoplasm of left adrenal gland      KENTON (obstructive sleep apnea)  uses a mouth appliance      History of prediabetes      Multiple sclerosis  dx 1986, not requiring medication, denies symptoms      Mediterranean anemia      H/O uvulectomy      H/O shoulder surgery  right      H/O colonoscopy  2021      History of prostate surgery  rezum procedure 2/2022          MEDICATIONS  (STANDING):  acetaminophen     Tablet .. 650 milliGRAM(s) Oral every 6 hours  heparin   Injectable 5000 Unit(s) SubCutaneous every 12 hours  hydrochlorothiazide 25 milliGRAM(s) Oral daily  losartan 100 milliGRAM(s) Oral daily  metoprolol succinate ER 25 milliGRAM(s) Oral daily    MEDICATIONS  (PRN):  oxyCODONE    IR 5 milliGRAM(s) Oral every 6 hours PRN Moderate Pain (4 - 6)      FAMILY HISTORY:  Family history of rectal cancer        Allergies    No Known Allergies    Intolerances        SOCIAL HISTORY:    REVIEW OF SYSTEMS: Otherwise negative as stated in HPI    Physical Exam  Vital signs  T(C): 37.1 (09-08-22 @ 17:26), Max: 37.1 (09-08-22 @ 02:00)  HR: 94 (09-08-22 @ 17:26)  BP: 129/75 (09-08-22 @ 17:26)  SpO2: 96% (09-08-22 @ 17:26)  Wt(kg): --    Output    OUT:    Indwelling Catheter - Urethral (mL): 4050 mL    Voided (mL): 350 mL  Total OUT: 4400 mL    Total NET: -4400 mL      OUT:    Voided (mL): 1225 mL  Total OUT: 1225 mL    Total NET: -1225 mL          Gen: awake and alert. NAD.   Pulm: Normal work of breathing on RA  : scant translucent punch urine noted in urinal.   Psych:  AOx3        LABS:      09-08 @ 11:19    WBC 8.73  / Hct 34.2  / SCr --       09-08 @ 07:09    WBC 8.18  / Hct 33.5  / SCr 0.88     09-08    139  |  102  |  15  ----------------------------<  107<H>  3.8   |  26  |  0.88    Ca    9.3      08 Sep 2022 07:09  Phos  1.8     09-08  Mg     1.90     09-08            Urine Cx:  Blood Cx:    RADIOLOGY:

## 2022-09-08 NOTE — SBIRT NOTE ADULT - NSSBIRTALCPOSREINDET_GEN_A_CORE
Discussed impact on alcohol use on body and mind.  Provided positive reinforcement and discussed moderation.

## 2022-09-08 NOTE — CONSULT NOTE ADULT - ASSESSMENT
66M now POD1 s/p L lap adrenalectomy. Urology consulted for gross hematuria s/p traumatic louise insertion    --Hematuria likely 2/2 inflation of louise in prostate. Also c/b his known hx of BPH  --In setting of urgency/frequency recommend bladder scan.   --If bladder scan elevated, please replace louise catheter  --Pt should discharge with finasteride 5mg and tamsulosin 0.4mg.   --Dispo per surgery team.   --Can f/u with his own urologist.     d/w Dr Singh     Available via Teams (Eliseo Zamudio) from 6a-6p, M-F. Please page [phone] for issues that arise on nights and weekends

## 2022-09-09 ENCOUNTER — TRANSCRIPTION ENCOUNTER (OUTPATIENT)
Age: 67
End: 2022-09-09

## 2022-09-09 VITALS
RESPIRATION RATE: 18 BRPM | OXYGEN SATURATION: 98 % | SYSTOLIC BLOOD PRESSURE: 105 MMHG | DIASTOLIC BLOOD PRESSURE: 63 MMHG | HEART RATE: 98 BPM | TEMPERATURE: 98 F

## 2022-09-09 LAB
ANION GAP SERPL CALC-SCNC: 9 MMOL/L — SIGNIFICANT CHANGE UP (ref 7–14)
BUN SERPL-MCNC: 14 MG/DL — SIGNIFICANT CHANGE UP (ref 7–23)
CALCIUM SERPL-MCNC: 9.2 MG/DL — SIGNIFICANT CHANGE UP (ref 8.4–10.5)
CHLORIDE SERPL-SCNC: 99 MMOL/L — SIGNIFICANT CHANGE UP (ref 98–107)
CO2 SERPL-SCNC: 27 MMOL/L — SIGNIFICANT CHANGE UP (ref 22–31)
CREAT SERPL-MCNC: 0.87 MG/DL — SIGNIFICANT CHANGE UP (ref 0.5–1.3)
EGFR: 94 ML/MIN/1.73M2 — SIGNIFICANT CHANGE UP
GLUCOSE SERPL-MCNC: 93 MG/DL — SIGNIFICANT CHANGE UP (ref 70–99)
HCT VFR BLD CALC: 35 % — LOW (ref 39–50)
HGB BLD-MCNC: 10.7 G/DL — LOW (ref 13–17)
MAGNESIUM SERPL-MCNC: 2 MG/DL — SIGNIFICANT CHANGE UP (ref 1.6–2.6)
MCHC RBC-ENTMCNC: 19.5 PG — LOW (ref 27–34)
MCHC RBC-ENTMCNC: 30.6 GM/DL — LOW (ref 32–36)
MCV RBC AUTO: 63.8 FL — LOW (ref 80–100)
NRBC # BLD: 0 /100 WBCS — SIGNIFICANT CHANGE UP (ref 0–0)
NRBC # FLD: 0 K/UL — SIGNIFICANT CHANGE UP (ref 0–0)
PHOSPHATE SERPL-MCNC: 2.5 MG/DL — SIGNIFICANT CHANGE UP (ref 2.5–4.5)
PLATELET # BLD AUTO: 177 K/UL — SIGNIFICANT CHANGE UP (ref 150–400)
POTASSIUM SERPL-MCNC: 3.9 MMOL/L — SIGNIFICANT CHANGE UP (ref 3.5–5.3)
POTASSIUM SERPL-SCNC: 3.9 MMOL/L — SIGNIFICANT CHANGE UP (ref 3.5–5.3)
RBC # BLD: 5.49 M/UL — SIGNIFICANT CHANGE UP (ref 4.2–5.8)
RBC # FLD: 17.3 % — HIGH (ref 10.3–14.5)
SODIUM SERPL-SCNC: 135 MMOL/L — SIGNIFICANT CHANGE UP (ref 135–145)
WBC # BLD: 8.31 K/UL — SIGNIFICANT CHANGE UP (ref 3.8–10.5)
WBC # FLD AUTO: 8.31 K/UL — SIGNIFICANT CHANGE UP (ref 3.8–10.5)

## 2022-09-09 RX ORDER — FINASTERIDE 5 MG/1
1 TABLET, FILM COATED ORAL
Qty: 30 | Refills: 0
Start: 2022-09-09 | End: 2022-10-08

## 2022-09-09 RX ORDER — TAMSULOSIN HYDROCHLORIDE 0.4 MG/1
1 CAPSULE ORAL
Qty: 30 | Refills: 0
Start: 2022-09-09 | End: 2022-10-08

## 2022-09-09 RX ORDER — CEPHALEXIN 500 MG
1 CAPSULE ORAL
Qty: 28 | Refills: 0
Start: 2022-09-09 | End: 2022-09-15

## 2022-09-09 RX ADMIN — HEPARIN SODIUM 5000 UNIT(S): 5000 INJECTION INTRAVENOUS; SUBCUTANEOUS at 11:25

## 2022-09-09 RX ADMIN — Medication 650 MILLIGRAM(S): at 06:41

## 2022-09-09 RX ADMIN — Medication 650 MILLIGRAM(S): at 11:26

## 2022-09-09 RX ADMIN — Medication 650 MILLIGRAM(S): at 07:11

## 2022-09-09 RX ADMIN — Medication 25 MILLIGRAM(S): at 06:41

## 2022-09-09 RX ADMIN — Medication 650 MILLIGRAM(S): at 12:54

## 2022-09-09 RX ADMIN — Medication 650 MILLIGRAM(S): at 00:14

## 2022-09-09 RX ADMIN — Medication 650 MILLIGRAM(S): at 00:44

## 2022-09-09 RX ADMIN — LOSARTAN POTASSIUM 100 MILLIGRAM(S): 100 TABLET, FILM COATED ORAL at 06:41

## 2022-09-09 NOTE — PROCEDURE NOTE - NSPERFORMEDBY_GEN_A_CORE
2yrs)           Review of Systems   Constitutional: Negative. HENT: Negative. Eyes: Negative. Respiratory: Negative. Cardiovascular: Negative. Gastrointestinal: Negative. Genitourinary: Negative. Musculoskeletal: Negative. Skin: Negative. Neurological: Negative. Psychiatric/Behavioral: Negative. Objective:   Physical Exam   Constitutional: She appears well-developed and well-nourished. She is playful. HENT:   Right Ear: Tympanic membrane normal.   Left Ear: Tympanic membrane normal.   Nose: No rhinorrhea or congestion. Mouth/Throat: Mucous membranes are moist. Dentition is normal. No tonsillar exudate. Oropharynx is clear. Pharynx is normal.   Eyes: Pupils are equal, round, and reactive to light. Conjunctivae are normal.   Neck: Normal range of motion. Neck supple. Cardiovascular: Normal rate, regular rhythm and S1 normal.   Murmur heard. Systolic murmur is present. Pulmonary/Chest: Effort normal and breath sounds normal. There is normal air entry. She has no wheezes. Abdominal: Soft. Bowel sounds are normal. She exhibits no distension. There is no tenderness. Musculoskeletal: Normal range of motion. Neurological: She is alert. Skin: Skin is warm and dry. No rash noted. Assessment:       Diagnosis Orders   1.  Encounter for routine child health examination without abnormal findings  Hemoglobin and Hematocrit, Blood           Plan:      Growth and Development on Par  Anticipatory Guidelines discussed  Healthy Lifestyles discussed  Immunizations UTD   Repeat HH  RTO in 1 year or prn
Tad Rey/Myself

## 2022-09-09 NOTE — PROGRESS NOTE ADULT - NSPROGADDITIONALINFOA_GEN_ALL_CORE
.  .  Attending Statement    Case d/w the surgical resident.  Overnight event noted and appreciated.  Discharge planning for today with urinary catheter and outpatient urology follow-up.

## 2022-09-09 NOTE — PROCEDURE NOTE - NSPROCDETAILS_GEN_ALL_CORE
sterile technique, indwelling urinary device inserted/sterile technique, non-indwelling urinary device inserted/sterile technique, old cysto removed, new tube inserted/prior to placement, an active physician order for the placement of a urinary catheter was verified/a urinary catheter insertion kit was used for all insertion materials

## 2022-09-09 NOTE — DISCHARGE NOTE NURSING/CASE MANAGEMENT/SOCIAL WORK - NSDCPEFALRISK_GEN_ALL_CORE
For information on Fall & Injury Prevention, visit: [email or web address] OR  [email or web address] OR  https://www.cdc.gov/steadi/patient.html

## 2022-09-09 NOTE — PROGRESS NOTE ADULT - ASSESSMENT
66M now POD1 s/p L lap adrenalectomy. Urology consulted for gross hematuria s/p traumatic louise insertion. Went into clot retention overnight requiring cystoscopic louise placement for false passage and CBI overnight. Urine clear on slow drip CBI and CBI was clamped this morning.     --Continue with Louise off CBI for now. If urine remains clear can continue off CBI.   --Would continue w/ louise catheter at discharge.   --Pt should discharge with finasteride 5mg and tamsulosin 0.4mg.   --Dispo per surgery team.   --Can f/u with his own urologist Dr Awad.       Available via Teams (Eliseo Zamudio) from 6a-6p, M-F. Please page [phone] for issues that arise on nights and weekends 
Patient is a 66 year old male with a PMHx of HTN, HLD and BPH who presented to pre-surgical testing with diagnosis of benign neoplasm of left adrenal gland.  Patient is now S/P laparoscopic left adrenalectomy on 9/7/22.  Hospital course complicated by urinary retention requiring louise replacement on 9/8/22.      PLAN:  - Regular diet  - Monitor hematuria  - Out of bed  - Pain control  - VTE prophylaxis with Heparin subcutaneous      #[id number]  D Team Surgery
Patient is a 66 year old male with a PMHx of HTN, HLD and BPH who presented to pre-surgical testing with diagnosis of benign neoplasm of left adrenal gland.  Patient is now S/P laparoscopic left adrenalectomy on 9/7/22.      PLAN:  - Regular diet  - Monitor hematuria  - Out of bed  - Pain control  - VTE prophylaxis with Heparin subcutaneous  - Discharge planning home today with outpatient urology follow up      #[id number]  D Team Surgery

## 2022-09-09 NOTE — PROGRESS NOTE ADULT - SUBJECTIVE AND OBJECTIVE BOX
DAILY PROGRESS NOTE:         24 hr events:  s/p cysto/louise placement for false passage  needed CBI overnight for hematuria, urine clear on low drip. CBI clamped this AM    Objective:    Vital Signs Last 24 Hrs  T(C): 36.6 (09 Sep 2022 05:48), Max: 37.1 (08 Sep 2022 17:26)  T(F): 97.8 (09 Sep 2022 05:48), Max: 98.7 (08 Sep 2022 17:26)  HR: 96 (09 Sep 2022 05:48) (88 - 109)  BP: 114/81 (09 Sep 2022 05:48) (108/67 - 132/93)  BP(mean): --  RR: 18 (09 Sep 2022 05:48) (16 - 18)  SpO2: 99% (09 Sep 2022 05:48) (96% - 99%)    Parameters below as of 09 Sep 2022 05:48  Patient On (Oxygen Delivery Method): room air        I&O's Detail    08 Sep 2022 07:01  -  09 Sep 2022 07:00  --------------------------------------------------------  IN:    Oral Fluid: 1990 mL  Total IN: 1990 mL    OUT:    Blood Loss (mL): 0 mL    Voided (mL): 1225 mL  Total OUT: 1225 mL    Total NET: 765 mL          Physical Exam:    General: NAD, well-nourished  Resp: Breathing comfortably on RA  CV: regular rate   : urine very clear on slow drip cbi   Psych: AOx3  Neuro: No focal deficits       Laboratory Results:                          10.7   8.73  )-----------( 189      ( 08 Sep 2022 11:19 )             34.2     09-08    139  |  102  |  15  ----------------------------<  107<H>  3.8   |  26  |  0.88    Ca    9.3      08 Sep 2022 07:09  Phos  1.8     09-08  Mg     1.90     09-08                        
Surgery Daily Progress Note  =====================================================  Interval / Overnight Events: Passed trial of void overnight.      HPI:  Patient is a 66 year old male with a PMHx of HTN, HLD and BPH who presented to pre-surgical testing with diagnosis of benign neoplasm of left adrenal gland.      PAST MEDICAL & SURGICAL HISTORY:  HTN (hypertension)  HLD (hyperlipidemia)  BPH (benign prostatic hyperplasia)  not requiring medication  Fatty liver  Benign neoplasm of left adrenal gland  KENTON (obstructive sleep apnea)  uses a mouth appliance  History of prediabetes  Multiple sclerosis  dx 1986, not requiring medication, denies symptoms  Mediterranean anemia  H/O uvulectomy  H/O shoulder surgery  right  H/O colonoscopy  2021  History of prostate surgery  rezum procedure 2/2022      ALLERGIES:  No Known Allergies    --------------------------------------------------------------------------------------    MEDICATIONS:    Neurologic Medications  acetaminophen     Tablet .. 650 milliGRAM(s) Oral every 6 hours  oxyCODONE    IR 5 milliGRAM(s) Oral every 6 hours PRN Moderate Pain (4 - 6)    Cardiovascular Medications  hydrochlorothiazide 25 milliGRAM(s) Oral daily  losartan 100 milliGRAM(s) Oral daily  metoprolol succinate ER 25 milliGRAM(s) Oral daily    Hematologic/Oncologic Medications  heparin   Injectable 5000 Unit(s) SubCutaneous every 12 hours    --------------------------------------------------------------------------------------    VITAL SIGNS:  T(C): 36.8 (08 Sep 2022 06:21), Max: 37.1 (08 Sep 2022 02:00)  T(F): 98.3 (08 Sep 2022 06:21), Max: 98.7 (08 Sep 2022 02:00)  HR: 98 (08 Sep 2022 06:21) (83 - 98)  BP: 134/87 (08 Sep 2022 06:21) (113/62 - 151/71)  BP(mean): 89 (07 Sep 2022 14:15) (86 - 115)  ABP: 163/76 (07 Sep 2022 14:15) (154/68 - 169/81)  ABP(mean): 111 (07 Sep 2022 14:15) (101 - 121)  RR: 16 (08 Sep 2022 06:21) (11 - 19)  SpO2: 99% (08 Sep 2022 06:21) (92% - 100%)    --------------------------------------------------------------------------------------    INS AND OUTS:    07 Sep 2022 07:01  -  08 Sep 2022 07:00  --------------------------------------------------------  IN:    Lactated Ringers: 225 mL    Oral Fluid: 360 mL  Total IN: 585 mL    OUT:    Indwelling Catheter - Urethral (mL): 4050 mL  Total OUT: 4050 mL    Total NET: -3465 mL    --------------------------------------------------------------------------------------    EXAM    NEUROLOGY  Exam: Normal, in no acute distress.    HEENT  Exam: Normocephalic, atraumatic.    RESPIRATORY  Exam: Normal expansion / effort.    CARDIOVASCULAR  Exam: S1, S2.  Regular rate and rhythm.    GI/NUTRITION  Exam: Abdomen soft, Non-tender, Non-distended.  Incision clean, dry and intact.  Current Diet: Regular diet    MUSCULOSKELETAL  Exam: All extremities moving spontaneously without limitations.      HEMATOLOGIC  [x] VTE Prophylaxis: heparin   Injectable 5000 Unit(s) SubCutaneous every 12 hours    --------------------------------------------------------------------------------------
Surgery Daily Progress Note  =====================================================  Interval / Overnight Events: Palencia replaced by urology overnight.  IV Ancef x1 dose given.      HPI:  Patient is a 66 year old male with a PMHx of HTN, HLD and BPH who presented to pre-surgical testing with diagnosis of benign neoplasm of left adrenal gland.      PAST MEDICAL & SURGICAL HISTORY:  HTN (hypertension)  HLD (hyperlipidemia)  BPH (benign prostatic hyperplasia)  not requiring medication  Fatty liver  Benign neoplasm of left adrenal gland  KENTON (obstructive sleep apnea)  uses a mouth appliance  History of prediabetes  Multiple sclerosis  dx 1986, not requiring medication, denies symptoms  Mediterranean anemia  H/O uvulectomy  H/O shoulder surgery  right  H/O colonoscopy  2021  History of prostate surgery  rezum procedure 2/2022      ALLERGIES:  No Known Allergies    --------------------------------------------------------------------------------------    MEDICATIONS:    Neurologic Medications  acetaminophen     Tablet .. 650 milliGRAM(s) Oral every 6 hours  oxyCODONE    IR 5 milliGRAM(s) Oral every 6 hours PRN Moderate Pain (4 - 6)    Cardiovascular Medications  hydrochlorothiazide 25 milliGRAM(s) Oral daily  losartan 100 milliGRAM(s) Oral daily  metoprolol succinate ER 25 milliGRAM(s) Oral daily    Hematologic/Oncologic Medications  heparin   Injectable 5000 Unit(s) SubCutaneous every 12 hours    --------------------------------------------------------------------------------------    VITAL SIGNS:  T(C): 36.6 (09 Sep 2022 05:48), Max: 37.1 (08 Sep 2022 17:26)  T(F): 97.8 (09 Sep 2022 05:48), Max: 98.7 (08 Sep 2022 17:26)  HR: 96 (09 Sep 2022 05:48) (88 - 109)  BP: 114/81 (09 Sep 2022 05:48) (108/67 - 134/87)  RR: 18 (09 Sep 2022 05:48) (16 - 18)  SpO2: 99% (09 Sep 2022 05:48) (96% - 99%)    --------------------------------------------------------------------------------------    INS AND OUTS:    07 Sep 2022 07:01  -  08 Sep 2022 07:00  --------------------------------------------------------  IN:    Lactated Ringers: 225 mL    Oral Fluid: 360 mL  Total IN: 585 mL    OUT:    Indwelling Catheter - Urethral (mL): 4050 mL    Voided (mL): 350 mL  Total OUT: 4400 mL    Total NET: -3815 mL      08 Sep 2022 07:01  -  09 Sep 2022 06:03  --------------------------------------------------------  IN:    Oral Fluid: 1990 mL  Total IN: 1990 mL    OUT:    Blood Loss (mL): 0 mL    Voided (mL): 1225 mL  Total OUT: 1225 mL    Total NET: 765 mL    --------------------------------------------------------------------------------------    EXAM    NEUROLOGY  Exam: Normal, in no acute distress.    HEENT  Exam: Normocephalic, atraumatic.    RESPIRATORY  Exam: Normal expansion / effort.    CARDIOVASCULAR  Exam: S1, S2.  Regular rate and rhythm.    GI/NUTRITION  Exam: Abdomen soft, Non-tender, Non-distended.  Incision clean, dry and intact.  Current Diet: Regular diet    MUSCULOSKELETAL  Exam: All extremities moving spontaneously without limitations.      HEMATOLOGIC  [x] VTE Prophylaxis: heparin   Injectable 5000 Unit(s) SubCutaneous every 12 hours    --------------------------------------------------------------------------------------

## 2022-09-09 NOTE — DISCHARGE NOTE NURSING/CASE MANAGEMENT/SOCIAL WORK - PATIENT PORTAL LINK FT
You can access the FollowMyHealth Patient Portal offered by [facility] by registering at the following website: [email or web address]. By joining [facility]’s FollowMyHealth portal, you will also be able to view your health information using other applications (apps) compatible with our system.

## 2022-09-09 NOTE — PROCEDURE NOTE - ADDITIONAL PROCEDURE DETAILS
Patient had  hematuria afterwards and CBI was started on slow drip. Spoke with primary team and recommended one dose of Ancef 2g, CBC, and do not touch CBI. Urology will follow patient and see in AM.

## 2022-09-28 ENCOUNTER — APPOINTMENT (OUTPATIENT)
Dept: SURGERY | Facility: CLINIC | Age: 67
End: 2022-09-28

## 2022-09-28 DIAGNOSIS — Z01.818 ENCOUNTER FOR OTHER PREPROCEDURAL EXAMINATION: ICD-10-CM

## 2022-09-28 DIAGNOSIS — Z09 ENCOUNTER FOR FOLLOW-UP EXAMINATION AFTER COMPLETED TREATMENT FOR CONDITIONS OTHER THAN MALIGNANT NEOPLASM: ICD-10-CM

## 2022-09-28 PROCEDURE — 99024 POSTOP FOLLOW-UP VISIT: CPT

## 2022-09-28 NOTE — PHYSICAL EXAM
[Respiratory Effort] : normal respiratory effort [Normal Heart Sounds] : normal heart sounds [No Rash or Lesion] : No rash or lesion [Alert] : alert [Oriented to Person] : oriented to person [Oriented to Place] : oriented to place [Oriented to Time] : oriented to time [Calm] : calm [de-identified] : NAD [de-identified] : EOM intact [de-identified] : Supple [de-identified] : Abdomen - soft, non-tender, non-distended, incisions clean and healing well [de-identified] : KINCAID x 4

## 2022-10-03 ENCOUNTER — LABORATORY RESULT (OUTPATIENT)
Age: 67
End: 2022-10-03

## 2022-10-03 ENCOUNTER — APPOINTMENT (OUTPATIENT)
Dept: ENDOCRINOLOGY | Facility: CLINIC | Age: 67
End: 2022-10-03

## 2022-10-03 VITALS
BODY MASS INDEX: 27.94 KG/M2 | DIASTOLIC BLOOD PRESSURE: 86 MMHG | SYSTOLIC BLOOD PRESSURE: 138 MMHG | HEIGHT: 67 IN | WEIGHT: 178 LBS | HEART RATE: 79 BPM | OXYGEN SATURATION: 98 % | TEMPERATURE: 97.5 F

## 2022-10-03 DIAGNOSIS — Z86.018 PERSONAL HISTORY OF OTHER BENIGN NEOPLASM: ICD-10-CM

## 2022-10-03 PROCEDURE — 36415 COLL VENOUS BLD VENIPUNCTURE: CPT

## 2022-10-03 PROCEDURE — 99212 OFFICE O/P EST SF 10 MIN: CPT | Mod: 25

## 2022-10-06 ENCOUNTER — APPOINTMENT (OUTPATIENT)
Dept: INTERNAL MEDICINE | Facility: CLINIC | Age: 67
End: 2022-10-06

## 2022-10-06 VITALS
HEIGHT: 67 IN | HEART RATE: 91 BPM | BODY MASS INDEX: 28.25 KG/M2 | TEMPERATURE: 97.6 F | DIASTOLIC BLOOD PRESSURE: 80 MMHG | SYSTOLIC BLOOD PRESSURE: 123 MMHG | RESPIRATION RATE: 16 BRPM | WEIGHT: 180 LBS | OXYGEN SATURATION: 98 %

## 2022-10-06 PROBLEM — Z86.018 HISTORY OF BENIGN ADRENAL TUMOR: Status: ACTIVE | Noted: 2022-10-06

## 2022-10-06 LAB
ACTH SER-ACNC: 54.3 PG/ML
ALBUMIN SERPL ELPH-MCNC: 4.5 G/DL
ALDOSTERONE SERUM: 7.9 NG/DL
ALP BLD-CCNC: 89 U/L
ALT SERPL-CCNC: 13 U/L
ANION GAP SERPL CALC-SCNC: 9 MMOL/L
AST SERPL-CCNC: 14 U/L
BASOPHILS # BLD AUTO: 0.06 K/UL
BASOPHILS NFR BLD AUTO: 1 %
BILIRUB SERPL-MCNC: 0.4 MG/DL
BUN SERPL-MCNC: 18 MG/DL
CALCIUM SERPL-MCNC: 9.3 MG/DL
CHLORIDE SERPL-SCNC: 105 MMOL/L
CO2 SERPL-SCNC: 28 MMOL/L
CORTIS SERPL-MCNC: 7.3 UG/DL
CREAT SERPL-MCNC: 0.93 MG/DL
DHEA-S SERPL-MCNC: 38.5 UG/DL
EGFR: 89 ML/MIN/1.73M2
EOSINOPHIL # BLD AUTO: 0.26 K/UL
EOSINOPHIL NFR BLD AUTO: 4.3 %
GLUCOSE SERPL-MCNC: 82 MG/DL
HCT VFR BLD CALC: 39.3 %
HGB BLD-MCNC: 11.7 G/DL
IMM GRANULOCYTES NFR BLD AUTO: 0.3 %
LYMPHOCYTES # BLD AUTO: 1.42 K/UL
LYMPHOCYTES NFR BLD AUTO: 23.2 %
MAN DIFF?: NORMAL
MCHC RBC-ENTMCNC: 20.2 PG
MCHC RBC-ENTMCNC: 29.8 GM/DL
MCV RBC AUTO: 67.8 FL
MONOCYTES # BLD AUTO: 0.57 K/UL
MONOCYTES NFR BLD AUTO: 9.3 %
NEUTROPHILS # BLD AUTO: 3.78 K/UL
NEUTROPHILS NFR BLD AUTO: 61.9 %
PLATELET # BLD AUTO: 235 K/UL
POTASSIUM SERPL-SCNC: 4.8 MMOL/L
PROT SERPL-MCNC: 6.7 G/DL
RBC # BLD: 5.8 M/UL
RBC # FLD: 18.4 %
SODIUM SERPL-SCNC: 141 MMOL/L
WBC # FLD AUTO: 6.11 K/UL

## 2022-10-06 PROCEDURE — 99214 OFFICE O/P EST MOD 30 MIN: CPT | Mod: 25

## 2022-10-06 PROCEDURE — 90662 IIV NO PRSV INCREASED AG IM: CPT

## 2022-10-06 PROCEDURE — G0008: CPT

## 2022-10-06 RX ORDER — HYDROCHLOROTHIAZIDE 25 MG/1
25 TABLET ORAL DAILY
Qty: 90 | Refills: 1 | Status: COMPLETED | COMMUNITY
Start: 1900-01-01 | End: 2022-10-06

## 2022-10-06 RX ORDER — LOSARTAN POTASSIUM 100 MG/1
100 TABLET, FILM COATED ORAL
Qty: 90 | Refills: 1 | Status: COMPLETED | COMMUNITY
Start: 2020-09-10 | End: 2022-10-06

## 2022-10-06 NOTE — ASSESSMENT
[FreeTextEntry1] : This is a 67-year-old male with prediabetes, hypertension, hyperlipidemia, BPH, multiple sclerosis, kidney stone, left nonfunctioning adrenal mass status post laparoscopic adrenalectomy on September 7, 2022.  \par Check blood work today including cortisol level. \par He reports BP was low after his operation and he has discontinued losartan and hydrochlorothiazide.  He remains on metoprolol.  He will follow-up with his PCP. abdominal pain

## 2022-10-06 NOTE — HISTORY OF PRESENT ILLNESS
[FreeTextEntry1] : CC: Adrenal nodule\par This is a 67-year-old male with nonfunctional adrenal mass status post left laparoscopic adrenalectomy in September 7, 2022, prediabetes, hypertension, hyperlipidemia, BPH, multiple sclerosis, kidney stone, here for follow-up.  \par Adrenal mass was discovered incidentally in February 2017.  MRI abdomen from September 2017 showed normal right adrenal gland, 35 x 43 x 36 mm mass in the left adrenal which is unchanged in morphology from prior CT scan from February 2017 and characteristics typical for benign lipid rich adenoma.\par CT abdomen pelvis without IV contrast in March 2019 for kidney stones showed stable 4.5 x 3.4 cm left adrenal mass benign lipid rich adenoma.\par On February 3, 2022 he underwent CT of the abdomen/pelvis for UTI which showed left 4.6 x 3.7 cm left adrenal nodule with 10 HU compatible with adenoma.\par Cortisol after low-dose dexamethasone suppression test was 2.2.  Salivary cortisol was normal x3.\par 24-hour urine cortisol normal.\par He underwent left laparoscopic adrenalectomy on September 7, 2022.  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  He states his blood pressure improved after his operation and he discontinued losartan and hydrochlorothiazide.\par No family history of endocrine tumors.\par

## 2022-10-06 NOTE — HISTORY OF PRESENT ILLNESS
[de-identified] : Patient is a 66 year old male with history of HTN, HLD, BPH presents status post left laparoscopic adrenalectomy in September 7, 2022 for follow-up on chronic problems \par \par Pt is status post left laparoscopic adrenalectomy  September 7, 2022 > pt has been monitoring his blood pressure at home, systolic ranging from 100-118 with an incident of 83. Diastolic ranging from 60-70s. He saw endo (Dr Najera Oct 3)\par Pt is requesting for a new urologist\par Denies HA, CP, abdominal pain, N/V, dizziness \par

## 2022-10-06 NOTE — PHYSICAL EXAM
[No Acute Distress] : no acute distress [Well Nourished] : well nourished [Well Developed] : well developed [Well-Appearing] : well-appearing [Normal Sclera/Conjunctiva] : normal sclera/conjunctiva [PERRL] : pupils equal round and reactive to light [EOMI] : extraocular movements intact [Normal Outer Ear/Nose] : the outer ears and nose were normal in appearance [Normal Oropharynx] : the oropharynx was normal [Normal TMs] : both tympanic membranes were normal [No JVD] : no jugular venous distention [No Lymphadenopathy] : no lymphadenopathy [Supple] : supple [No Respiratory Distress] : no respiratory distress  [No Accessory Muscle Use] : no accessory muscle use [Clear to Auscultation] : lungs were clear to auscultation bilaterally [Normal Rate] : normal rate  [Regular Rhythm] : with a regular rhythm [Normal S1, S2] : normal S1 and S2 [No Murmur] : no murmur heard [Pedal Pulses Present] : the pedal pulses are present [No Edema] : there was no peripheral edema [Soft] : abdomen soft [Non Tender] : non-tender [Non-distended] : non-distended [Normal Bowel Sounds] : normal bowel sounds [Normal Posterior Cervical Nodes] : no posterior cervical lymphadenopathy [Normal Anterior Cervical Nodes] : no anterior cervical lymphadenopathy [No CVA Tenderness] : no CVA  tenderness [No Spinal Tenderness] : no spinal tenderness [No Joint Swelling] : no joint swelling [Grossly Normal Strength/Tone] : grossly normal strength/tone [No Rash] : no rash [No Focal Deficits] : no focal deficits [Normal Gait] : normal gait [Deep Tendon Reflexes (DTR)] : deep tendon reflexes were 2+ and symmetric [Normal Affect] : the affect was normal [Normal Insight/Judgement] : insight and judgment were intact

## 2022-10-06 NOTE — PLAN
[FreeTextEntry1] : follow up\par \par flu shot today\par \par Htn\par now with low BP \par d/c HCTZ\par cont Losartan 100 mg QD\par cont Toprol 25 mg QD \par cardio folow up\par \par BPH\par urology referral \par PSA \par \par Elevated Hb A1C\par need low carb diet/ exercise\par will monitor Hb A1C \par labs ordered, pt ate today and will come tomorrow for labs to be drawn > follow up in 1 week for results\par \par

## 2022-10-06 NOTE — PHYSICAL EXAM
[Alert] : alert [Well Nourished] : well nourished [Healthy Appearance] : healthy appearance [No Acute Distress] : no acute distress [Well Developed] : well developed [Normal Voice/Communication] : normal voice communication [Normal Sclera/Conjunctiva] : normal sclera/conjunctiva [No Proptosis] : no proptosis [No Neck Mass] : no neck mass was observed [No LAD] : no lymphadenopathy [Supple] : the neck was supple [No Respiratory Distress] : no respiratory distress [Not Tender] : non-tender [Not Distended] : not distended [Soft] : abdomen soft [No Stigmata of Cushings Syndrome] : no stigmata of Cushings Syndrome [Acanthosis Nigricans] : no acanthosis nigricans [Normal Affect] : the affect was normal [Normal Insight/Judgement] : insight and judgment were intact [Normal Mood] : the mood was normal [de-identified] : Healed surgical scars

## 2022-10-10 LAB
DOPAMINE UR-MCNC: <30 PG/ML
EPINEPH UR-MCNC: <15 PG/ML
METANEPHRINE, PL: 12.4 PG/ML
NOREPINEPH UR-MCNC: 337 PG/ML
NORMETANEPHRINE, PL: 141.8 PG/ML
RENIN ACTIVITY, PLASMA: 0.92 NG/ML/HR

## 2022-10-17 ENCOUNTER — NON-APPOINTMENT (OUTPATIENT)
Age: 67
End: 2022-10-17

## 2022-10-18 ENCOUNTER — APPOINTMENT (OUTPATIENT)
Dept: INTERNAL MEDICINE | Facility: CLINIC | Age: 67
End: 2022-10-18

## 2022-10-18 ENCOUNTER — NON-APPOINTMENT (OUTPATIENT)
Age: 67
End: 2022-10-18

## 2022-10-18 VITALS
HEIGHT: 67 IN | OXYGEN SATURATION: 98 % | HEART RATE: 90 BPM | DIASTOLIC BLOOD PRESSURE: 84 MMHG | RESPIRATION RATE: 12 BRPM | BODY MASS INDEX: 28.41 KG/M2 | TEMPERATURE: 97.2 F | SYSTOLIC BLOOD PRESSURE: 129 MMHG | WEIGHT: 181 LBS

## 2022-10-18 VITALS — SYSTOLIC BLOOD PRESSURE: 118 MMHG | DIASTOLIC BLOOD PRESSURE: 76 MMHG

## 2022-10-18 DIAGNOSIS — N40.0 BENIGN PROSTATIC HYPERPLASIA WITHOUT LOWER URINARY TRACT SYMPMS: ICD-10-CM

## 2022-10-18 DIAGNOSIS — R97.20 ELEVATED PROSTATE, SPECIFIC ANTIGEN [PSA]: ICD-10-CM

## 2022-10-18 PROCEDURE — 99214 OFFICE O/P EST MOD 30 MIN: CPT | Mod: 25

## 2022-10-18 PROCEDURE — 93000 ELECTROCARDIOGRAM COMPLETE: CPT

## 2022-10-18 RX ORDER — HYDROCHLOROTHIAZIDE 25 MG/1
25 TABLET ORAL
Refills: 0 | Status: DISCONTINUED | COMMUNITY
End: 2022-10-18

## 2022-10-18 NOTE — PLAN
[FreeTextEntry1] : Follow-up \par \par Labs reviewed with understanding \par \par BPH/ elevated PSA \par Follows with urology \par had MRI prostate > without acute pathology \par cont Flomax 0.4 mg daily\par \par HTN/HLD\par Low sodium, low cholesterol diet/ increased physical activity\par cont Losartan 100 mg daily\par cont Metoprolol 25 mg daily\par \par PEARSON\par EKG : Incomplete Right BBB, unchanged \par cardio follow up \par \par

## 2022-10-24 ENCOUNTER — NON-APPOINTMENT (OUTPATIENT)
Age: 67
End: 2022-10-24

## 2022-10-26 LAB
CHOLEST SERPL-MCNC: 204 MG/DL
ESTIMATED AVERAGE GLUCOSE: 111 MG/DL
HBA1C MFR BLD HPLC: 5.5 %
HDLC SERPL-MCNC: 46 MG/DL
LDLC SERPL CALC-MCNC: 131 MG/DL
NONHDLC SERPL-MCNC: 157 MG/DL
PSA SERPL-MCNC: 7.57 NG/ML
TRIGL SERPL-MCNC: 132 MG/DL

## 2022-11-03 ENCOUNTER — APPOINTMENT (OUTPATIENT)
Dept: CARDIOLOGY | Facility: CLINIC | Age: 67
End: 2022-11-03

## 2022-11-03 ENCOUNTER — NON-APPOINTMENT (OUTPATIENT)
Age: 67
End: 2022-11-03

## 2022-11-03 VITALS
OXYGEN SATURATION: 97 % | WEIGHT: 184 LBS | HEART RATE: 88 BPM | TEMPERATURE: 97.5 F | SYSTOLIC BLOOD PRESSURE: 144 MMHG | DIASTOLIC BLOOD PRESSURE: 82 MMHG | BODY MASS INDEX: 28.88 KG/M2 | HEIGHT: 67 IN | RESPIRATION RATE: 12 BRPM

## 2022-11-03 PROCEDURE — 93000 ELECTROCARDIOGRAM COMPLETE: CPT

## 2022-11-03 PROCEDURE — 99213 OFFICE O/P EST LOW 20 MIN: CPT | Mod: 25

## 2022-11-03 RX ORDER — OXYCODONE 5 MG/1
5 TABLET ORAL
Qty: 6 | Refills: 0 | Status: DISCONTINUED | COMMUNITY
Start: 2022-09-08

## 2022-11-03 RX ORDER — FINASTERIDE 5 MG/1
5 TABLET, FILM COATED ORAL
Qty: 30 | Refills: 0 | Status: DISCONTINUED | COMMUNITY
Start: 2022-09-09

## 2022-11-03 RX ORDER — SODIUM SULFATE, POTASSIUM SULFATE, MAGNESIUM SULFATE 17.5; 3.13; 1.6 G/ML; G/ML; G/ML
17.5-3.13-1.6 SOLUTION, CONCENTRATE ORAL
Qty: 1 | Refills: 0 | Status: DISCONTINUED | COMMUNITY
Start: 2021-05-04 | End: 2022-11-03

## 2022-11-03 RX ORDER — CEPHALEXIN 500 MG/1
500 CAPSULE ORAL
Qty: 28 | Refills: 0 | Status: DISCONTINUED | COMMUNITY
Start: 2022-09-09

## 2022-11-03 NOTE — DISCUSSION/SUMMARY
[FreeTextEntry1] : The patient is a 67-year-old gentleman MS, HTN, with symptomatic hypotension.\par #1 CV- ECG unchanged with inc RBBB, echo normal  and stress test negative in past\par #2 Htn- c/w losartan but lower to 50mg then reevaluate\par #3 Tachycardia- c/w toprol 25mg\par #4 MS- not on medication secondary to cost, does walk with cane for precaution but can walk.\par #5 General- Mediterranean diet recommended. [EKG obtained to assist in diagnosis and management of assessed problem(s)] : EKG obtained to assist in diagnosis and management of assessed problem(s)

## 2022-11-03 NOTE — HISTORY OF PRESENT ILLNESS
[FreeTextEntry1] : Steven had his adrenal mass removed. Then BP low and HCTZ was stopped. Now still lightheaded and SOB with BP at home just over 100.

## 2022-11-09 ENCOUNTER — APPOINTMENT (OUTPATIENT)
Dept: CARDIOLOGY | Facility: CLINIC | Age: 67
End: 2022-11-09

## 2022-11-09 PROCEDURE — 93306 TTE W/DOPPLER COMPLETE: CPT

## 2022-11-23 ENCOUNTER — APPOINTMENT (OUTPATIENT)
Dept: CARDIOLOGY | Facility: CLINIC | Age: 67
End: 2022-11-23

## 2022-11-23 PROCEDURE — 93015 CV STRESS TEST SUPVJ I&R: CPT

## 2022-11-23 PROCEDURE — A9500: CPT

## 2022-11-23 PROCEDURE — 78452 HT MUSCLE IMAGE SPECT MULT: CPT

## 2022-12-08 ENCOUNTER — APPOINTMENT (OUTPATIENT)
Dept: CARDIOLOGY | Facility: CLINIC | Age: 67
End: 2022-12-08

## 2022-12-08 ENCOUNTER — NON-APPOINTMENT (OUTPATIENT)
Age: 67
End: 2022-12-08

## 2022-12-08 VITALS
RESPIRATION RATE: 12 BRPM | SYSTOLIC BLOOD PRESSURE: 121 MMHG | BODY MASS INDEX: 28.88 KG/M2 | WEIGHT: 184 LBS | HEART RATE: 102 BPM | TEMPERATURE: 97.2 F | HEIGHT: 67 IN | OXYGEN SATURATION: 97 % | DIASTOLIC BLOOD PRESSURE: 80 MMHG

## 2022-12-08 DIAGNOSIS — R06.09 OTHER FORMS OF DYSPNEA: ICD-10-CM

## 2022-12-08 PROCEDURE — 99213 OFFICE O/P EST LOW 20 MIN: CPT | Mod: 25

## 2022-12-08 PROCEDURE — 93000 ELECTROCARDIOGRAM COMPLETE: CPT

## 2022-12-08 NOTE — HISTORY OF PRESENT ILLNESS
[FreeTextEntry1] : Steven has been feeling weak with no energy and inability to do what he was doing before. The dizziness resolved with lower dose losartan. Everything changed after adrenalectomy.

## 2022-12-08 NOTE — DISCUSSION/SUMMARY
[FreeTextEntry1] : The patient is a 67-year-old gentleman MS, HTN, s/p adrenalectomy with dyspnea and weakness with equivocal stress test.\par #1 CV- reviewed test results, proceed with cardiac ct.\par #2 Htn- c/w losartan 50mg \par #3 Tachycardia- c/w toprol 25mg\par #4 MS- not on medication secondary to cost, does walk with cane for precaution but can walk.\par #5 General- Mediterranean diet recommended, discuss with end possible imaging of abdomen [EKG obtained to assist in diagnosis and management of assessed problem(s)] : EKG obtained to assist in diagnosis and management of assessed problem(s)

## 2022-12-12 ENCOUNTER — APPOINTMENT (OUTPATIENT)
Dept: NEUROLOGY | Facility: CLINIC | Age: 67
End: 2022-12-12

## 2022-12-14 NOTE — HISTORY OF PRESENT ILLNESS

## 2022-12-29 NOTE — ED PROVIDER NOTE - TOBACCO USE
Provider is aware. Already discussed starting Metformin during visit and will likely increase Lantus to 15u but patient wants to know A1c first.    Unknown if ever smoked

## 2023-01-03 ENCOUNTER — APPOINTMENT (OUTPATIENT)
Dept: CT IMAGING | Facility: CLINIC | Age: 68
End: 2023-01-03
Payer: MEDICARE

## 2023-01-03 ENCOUNTER — OUTPATIENT (OUTPATIENT)
Dept: OUTPATIENT SERVICES | Facility: HOSPITAL | Age: 68
LOS: 1 days | End: 2023-01-03
Payer: MEDICARE

## 2023-01-03 DIAGNOSIS — R94.39 ABNORMAL RESULT OF OTHER CARDIOVASCULAR FUNCTION STUDY: ICD-10-CM

## 2023-01-03 DIAGNOSIS — R07.89 OTHER CHEST PAIN: ICD-10-CM

## 2023-01-03 PROCEDURE — 75574 CT ANGIO HRT W/3D IMAGE: CPT | Mod: 26

## 2023-01-03 PROCEDURE — 75574 CT ANGIO HRT W/3D IMAGE: CPT

## 2023-01-03 PROCEDURE — 82565 ASSAY OF CREATININE: CPT

## 2023-02-22 ENCOUNTER — APPOINTMENT (OUTPATIENT)
Dept: INTERNAL MEDICINE | Facility: CLINIC | Age: 68
End: 2023-02-22
Payer: MEDICARE

## 2023-02-22 VITALS
TEMPERATURE: 97.5 F | HEIGHT: 67 IN | WEIGHT: 181 LBS | DIASTOLIC BLOOD PRESSURE: 79 MMHG | OXYGEN SATURATION: 95 % | RESPIRATION RATE: 12 BRPM | SYSTOLIC BLOOD PRESSURE: 123 MMHG | HEART RATE: 83 BPM | BODY MASS INDEX: 28.41 KG/M2

## 2023-02-22 DIAGNOSIS — M65.30 TRIGGER FINGER, UNSPECIFIED FINGER: ICD-10-CM

## 2023-02-22 DIAGNOSIS — M25.549 PAIN IN JOINTS OF UNSPECIFIED HAND: ICD-10-CM

## 2023-02-22 PROCEDURE — 99214 OFFICE O/P EST MOD 30 MIN: CPT

## 2023-02-23 ENCOUNTER — NON-APPOINTMENT (OUTPATIENT)
Age: 68
End: 2023-02-23

## 2023-02-26 NOTE — ASU PREOP CHECKLIST - STERILIZATION AFFIRMATION
Relevant Problems   NEUROLOGY   (+) Stroke [facility])       Anesthetic History   No history of anesthetic complications            Review of Systems / Medical History  Patient summary reviewed, nursing notes reviewed and pertinent labs reviewed    Pulmonary  Within defined limits                 Neuro/Psych       CVA       Cardiovascular  Within defined limits                Exercise tolerance: >4 METS     GI/Hepatic/Renal  Within defined limits              Endo/Other  Within defined limits           Other Findings                   Anesthetic Plan    ASA: 4, emergent  Anesthesia type: general            Anesthetic plan and risks discussed with: Family n/a

## 2023-03-06 LAB
ALBUMIN SERPL ELPH-MCNC: 4.7 G/DL
ALP BLD-CCNC: 85 U/L
ALT SERPL-CCNC: 25 U/L
ANION GAP SERPL CALC-SCNC: 15 MMOL/L
AST SERPL-CCNC: 26 U/L
BILIRUB SERPL-MCNC: 0.8 MG/DL
BUN SERPL-MCNC: 18 MG/DL
CALCIUM SERPL-MCNC: 9.9 MG/DL
CHLORIDE SERPL-SCNC: 99 MMOL/L
CHOLEST SERPL-MCNC: 139 MG/DL
CO2 SERPL-SCNC: 22 MMOL/L
CREAT SERPL-MCNC: 0.93 MG/DL
CRP SERPL-MCNC: <3 MG/L
EGFR: 89 ML/MIN/1.73M2
ERYTHROCYTE [SEDIMENTATION RATE] IN BLOOD BY WESTERGREN METHOD: 21 MM/HR
GLUCOSE SERPL-MCNC: 83 MG/DL
HDLC SERPL-MCNC: 51 MG/DL
LDLC SERPL CALC-MCNC: 69 MG/DL
NONHDLC SERPL-MCNC: 89 MG/DL
POTASSIUM SERPL-SCNC: 4.1 MMOL/L
PROT SERPL-MCNC: 7.6 G/DL
RHEUMATOID FACT SER QL: <10 IU/ML
SODIUM SERPL-SCNC: 136 MMOL/L
TRIGL SERPL-MCNC: 97 MG/DL
URATE SERPL-MCNC: 6.2 MG/DL

## 2023-03-08 ENCOUNTER — APPOINTMENT (OUTPATIENT)
Dept: CARDIOLOGY | Facility: CLINIC | Age: 68
End: 2023-03-08
Payer: MEDICARE

## 2023-03-08 ENCOUNTER — NON-APPOINTMENT (OUTPATIENT)
Age: 68
End: 2023-03-08

## 2023-03-08 VITALS
OXYGEN SATURATION: 97 % | BODY MASS INDEX: 28.25 KG/M2 | DIASTOLIC BLOOD PRESSURE: 82 MMHG | HEART RATE: 99 BPM | SYSTOLIC BLOOD PRESSURE: 132 MMHG | WEIGHT: 180 LBS | RESPIRATION RATE: 12 BRPM | HEIGHT: 67 IN

## 2023-03-08 LAB
ESTIMATED AVERAGE GLUCOSE: 120 MG/DL
HBA1C MFR BLD HPLC: 5.8 %

## 2023-03-08 PROCEDURE — 93000 ELECTROCARDIOGRAM COMPLETE: CPT

## 2023-03-08 PROCEDURE — 99214 OFFICE O/P EST MOD 30 MIN: CPT | Mod: 25

## 2023-03-08 NOTE — DISCUSSION/SUMMARY
[FreeTextEntry1] : The patient is a 67-year-old gentleman MS, HTN, s/p adrenalectomy, nonobstructive CAD with PVCs\par #1 CV- + Calcium, nonobstructive dz\par #2 Htn- c/w losartan 50mg \par #3 PVC- c/w toprol 25mg, event monitor today\par #4 HLD- c/w rosuvastatin 5mg, optimal\par #5 MS- not on medication secondary to cost, does walk with cane for precaution but can walk.\par #6 General- Mediterranean diet recommended, discuss with end possible imaging of abdomen [EKG obtained to assist in diagnosis and management of assessed problem(s)] : EKG obtained to assist in diagnosis and management of assessed problem(s)

## 2023-03-08 NOTE — HISTORY OF PRESENT ILLNESS
[FreeTextEntry1] : Steven is trying to do stationary bicycle 1-1.5 miles at home. Walks outside and waiting for weather to start gardening. Occasionally feels the skip beats. No lightheadedness or dizziness.

## 2023-03-29 ENCOUNTER — APPOINTMENT (OUTPATIENT)
Dept: ORTHOPEDIC SURGERY | Facility: CLINIC | Age: 68
End: 2023-03-29
Payer: MEDICARE

## 2023-03-29 ENCOUNTER — NON-APPOINTMENT (OUTPATIENT)
Age: 68
End: 2023-03-29

## 2023-03-29 VITALS
HEART RATE: 84 BPM | DIASTOLIC BLOOD PRESSURE: 84 MMHG | SYSTOLIC BLOOD PRESSURE: 117 MMHG | HEIGHT: 67 IN | WEIGHT: 180 LBS | BODY MASS INDEX: 28.25 KG/M2

## 2023-03-29 PROCEDURE — 99214 OFFICE O/P EST MOD 30 MIN: CPT | Mod: 25

## 2023-03-29 PROCEDURE — 20550 NJX 1 TENDON SHEATH/LIGAMENT: CPT | Mod: LT,F2

## 2023-03-29 RX ORDER — BETAMETHA AC,SOD PHOS/WATER/PF 6 MG/ML
6 (3-3) VIAL (ML) INJECTION
Qty: 1 | Refills: 0 | Status: COMPLETED | OUTPATIENT
Start: 2023-03-29

## 2023-03-29 RX ORDER — LIDOCAINE HYDROCHLORIDE 10 MG/ML
1 INJECTION, SOLUTION INFILTRATION; PERINEURAL
Refills: 0 | Status: COMPLETED | OUTPATIENT
Start: 2023-03-29

## 2023-03-29 RX ADMIN — LIDOCAINE HYDROCHLORIDE 0.5 %: 10 INJECTION, SOLUTION INFILTRATION; PERINEURAL at 00:00

## 2023-03-29 RX ADMIN — BETAMETHASONE SODIUM PHOSPHATE AND BETAMETHASONE ACETATE 1 MG/ML: 3; 3 INJECTION, SUSPENSION INTRA-ARTICULAR; INTRALESIONAL; INTRAMUSCULAR; SOFT TISSUE at 00:00

## 2023-03-29 NOTE — DISCUSSION/SUMMARY
[FreeTextEntry1] : He has findings consistent with bilateral middle and ring finger trigger fingers, most symptomatic at the left middle finger trigger finger.\par \par I had a discussion with the patient regarding today's visit, the prognosis of this diagnosis, and treatment recommendations and options. With regard to the left middle finger, he agreed to proceed with a cortisone injection at the flexor tendon sheath.\par \par With regard to the bilateral ring and right middle fingers, given his symptoms are mild, I recommended observation. He will follow up as needed, according to his symptoms in this regard.\par \par He has agreed to the above plan of management and has expressed full understanding.  All questions were fully answered to their satisfaction. \par \par My cumulative time spent on this visit included: Preparation for the visit, review of the medical records, review of pertinent diagnostic studies, examination and counseling of the patient on the above diagnosis, treatment plan and prognosis, orders of diagnostic tests, medication and/or appropriate procedures and documentation in the medical records of today's visit.

## 2023-03-29 NOTE — PHYSICAL EXAM
[de-identified] : - Constitutional: This is a male in no obvious distress.  \par - Psych: Patient is alert and oriented to person, place and time.  Patient has a normal mood and affect.\par - Cardiovascular: Normal pulses throughout the upper extremities.  No significant varicosities are noted in the upper extremities. \par - Neuro: Strength and sensation are intact throughout the upper extremities.  Patient has normal coordination.\par - Respiratory:  Patient exhibits no evidence of shortness of breath or difficulty breathing.\par - Skin: No rashes, lesions, or other abnormalities are noted in the upper extremities.\par \par --- \par \par Examination of his right hand demonstrates no swelling or tenderness along the A1 pulleys of the middle or ring fingers.  There is no triggering.  He states that the trigger in the morning.  There is no triggering of the other digits.  He is neurovascularly intact distally.\par \par Examination of his left hand demonstrates mild swelling and tenderness along the A1 pulley of the middle finger with mild triggering.  There is mild tenderness without swelling along the A1 pulley of the ring finger without triggering.  There is no triggering of the other digits.  He is neurovascularly intact distally.

## 2023-03-29 NOTE — CONSULT LETTER
[Dear  ___] : Dear  [unfilled], [Consult Letter:] : I had the pleasure of evaluating your patient, [unfilled]. [Please see my note below.] : Please see my note below. [Consult Closing:] : Thank you very much for allowing me to participate in the care of this patient.  If you have any questions, please do not hesitate to contact me. [Sincerely,] : Sincerely, [FreeTextEntry3] : Abdon Moctezuma M.D.\par Surgery of the Hand and Upper Extremity\par Orthopaedic Surgery\par Chief, Hand Service, [facility]\par Director, Hand Service, [facility]\par  Of Orthopaedic Surgery, [facility] [employer] [facility]\par [facility] Email: [email or web address] Office Phone: [phone]

## 2023-03-29 NOTE — END OF VISIT
[FreeTextEntry3] : This note was written by Rebecca Owens on 03/29/2023 acting solely as a scribe for Dr. Abdon Moctezuma.\par  \par All medical record entries made by the Scribe were at my, Dr. Abdon Moctezuma, direction and personally dictated by me on 03/29/2023. I have personally reviewed the chart and agree that the record accurately reflects my personal performance of the history, physical exam, assessment and plan.

## 2023-03-29 NOTE — ADDENDUM
[FreeTextEntry1] : I, Rebecca Owens, acted solely as a scribe for Dr. Moctezuma on this date on 03/29/2023.

## 2023-03-29 NOTE — HISTORY OF PRESENT ILLNESS
[Right] : right hand dominant [FreeTextEntry1] : He comes in today for evaluation of bilateral hand pain. He localizes pain to the bilateral middle and ring fingers. He states his pain is somewhat mild however upon waking in the morning, the fingers click and trigger. He has pain when flexing and extending the digits. He additionally complains of right hand tingling. He notes he did a lot of computer work in the past.\par \par He was referred by his PCP, Dr. Kandi Collier.

## 2023-03-29 NOTE — PROCEDURE
[FreeTextEntry1] : -  After a discussion of risks and benefits, the patient agreed to proceed with a cortisone injection.  \par -  Side: Left\par -  Finger: Middle finger trigger finger\par -  Medications: 0.5 cc of 1% Lidocaine and 1 cc of betamethasone, using sterile technique.\par -  Patient tolerated the procedure well, without complications.\par -  Patient was told that the symptoms may worsen for a day or two, and should then begin to improve. \par -  Instructions: Patient was instructed on activity modification for the next several days.\par -  Follow-up: Within 4 weeks to assess response to the injection.

## 2023-04-05 ENCOUNTER — APPOINTMENT (OUTPATIENT)
Dept: CARDIOLOGY | Facility: CLINIC | Age: 68
End: 2023-04-05
Payer: MEDICARE

## 2023-04-05 ENCOUNTER — NON-APPOINTMENT (OUTPATIENT)
Age: 68
End: 2023-04-05

## 2023-04-05 VITALS
SYSTOLIC BLOOD PRESSURE: 133 MMHG | HEIGHT: 67 IN | BODY MASS INDEX: 27.94 KG/M2 | RESPIRATION RATE: 12 BRPM | HEART RATE: 79 BPM | DIASTOLIC BLOOD PRESSURE: 79 MMHG | OXYGEN SATURATION: 99 % | WEIGHT: 178 LBS

## 2023-04-05 PROCEDURE — 99214 OFFICE O/P EST MOD 30 MIN: CPT | Mod: 25

## 2023-04-05 PROCEDURE — 93000 ELECTROCARDIOGRAM COMPLETE: CPT

## 2023-04-05 NOTE — HISTORY OF PRESENT ILLNESS
[FreeTextEntry1] : Steven continues to have shortness of breath when he is doing some gardening.  Sometimes when he bends over his symptoms are worse.  He knows that there is something wrong.  Wife thinks he is getting more forgetful.

## 2023-04-05 NOTE — DISCUSSION/SUMMARY
[FreeTextEntry1] : The patient is a 67-year-old gentleman MS, HTN, s/p adrenalectomy, nonobstructive CAD, PVC with persistent symptoms\par #1 CV- + Calcium, repeat cardiac CT\par #2 Htn- c/w losartan 50mg \par #3 PVC- c/w toprol 25mg, event monitor no significant VT\par #4 HLD- c/w rosuvastatin 5mg, optimal\par #5 MS- not on medication secondary to cost, does walk with cane for precaution but can walk.\par #6 General- Mediterranean diet recommended [EKG obtained to assist in diagnosis and management of assessed problem(s)] : EKG obtained to assist in diagnosis and management of assessed problem(s)

## 2023-04-10 ENCOUNTER — TRANSCRIPTION ENCOUNTER (OUTPATIENT)
Age: 68
End: 2023-04-10

## 2023-05-08 ENCOUNTER — APPOINTMENT (OUTPATIENT)
Dept: CARDIOLOGY | Facility: CLINIC | Age: 68
End: 2023-05-08

## 2023-05-09 ENCOUNTER — APPOINTMENT (OUTPATIENT)
Dept: CT IMAGING | Facility: CLINIC | Age: 68
End: 2023-05-09

## 2023-05-17 NOTE — PLAN
[FreeTextEntry1] : Follow-up \par \par \par \par Trigger finger/ Arthralgia of hands \par Hand Ortho \par Will check labs \par \par HTN/HLD \par Low sodium, low cholesterol diet/ increased physical activity \par cont Losartan 50 mg daily \par cont Metoprolol 25 mg daily \par cont Crestor 5 mg daily \par \par Labs ordered pt to follow-up in 1 week for results \par \par

## 2023-05-17 NOTE — HISTORY OF PRESENT ILLNESS
[de-identified] : Patient is a 67 year old male with history of HTN, HLD, BPH presents status post left laparoscopic adrenalectomy in September 7, 2022 for follow-up \par \par Patient reports that over the last  two months he has been experiencing pain/tenderness, stiffness in is finger joints. He states it started on left hand second and third digits and now the right hand middle finger has been bothering him as well. + dull pain and finger in stuck position in the mornings which releases after a few minutes.  + numbness/tingling in right hand intermittently \par Otherwise he feels well, Denies any CP, SOB, HA, dizziness, N/V or abdominal pain  \par Denies any CP, SOB, N/V or abdominal pain \par \par Of note HCTZ was stopped after adrenal surgery due to low blood pressures

## 2023-07-31 ENCOUNTER — NON-APPOINTMENT (OUTPATIENT)
Age: 68
End: 2023-07-31

## 2023-08-09 ENCOUNTER — APPOINTMENT (OUTPATIENT)
Dept: ORTHOPEDIC SURGERY | Facility: CLINIC | Age: 68
End: 2023-08-09
Payer: MEDICARE

## 2023-08-11 ENCOUNTER — APPOINTMENT (OUTPATIENT)
Dept: INTERNAL MEDICINE | Facility: CLINIC | Age: 68
End: 2023-08-11
Payer: MEDICARE

## 2023-08-11 VITALS
HEIGHT: 67 IN | WEIGHT: 174 LBS | HEART RATE: 88 BPM | DIASTOLIC BLOOD PRESSURE: 86 MMHG | RESPIRATION RATE: 12 BRPM | OXYGEN SATURATION: 98 % | TEMPERATURE: 97.5 F | BODY MASS INDEX: 27.31 KG/M2 | SYSTOLIC BLOOD PRESSURE: 135 MMHG

## 2023-08-11 DIAGNOSIS — K64.9 UNSPECIFIED HEMORRHOIDS: ICD-10-CM

## 2023-08-11 DIAGNOSIS — K59.00 CONSTIPATION, UNSPECIFIED: ICD-10-CM

## 2023-08-11 PROCEDURE — 99214 OFFICE O/P EST MOD 30 MIN: CPT

## 2023-08-11 RX ORDER — HYDROCORTISONE ACETATE 25 MG/1
25 SUPPOSITORY RECTAL
Qty: 1 | Refills: 0 | Status: ACTIVE | COMMUNITY
Start: 2023-08-11 | End: 1900-01-01

## 2023-08-12 PROBLEM — K59.00 CONSTIPATION: Status: ACTIVE | Noted: 2023-08-12

## 2023-08-12 NOTE — PHYSICAL EXAM
[No Acute Distress] : no acute distress [Well Nourished] : well nourished [Well Developed] : well developed [Well-Appearing] : well-appearing [Normal Sclera/Conjunctiva] : normal sclera/conjunctiva [Normal Outer Ear/Nose] : the outer ears and nose were normal in appearance [Normal Oropharynx] : the oropharynx was normal [No JVD] : no jugular venous distention [No Lymphadenopathy] : no lymphadenopathy [Supple] : supple [No Respiratory Distress] : no respiratory distress  [No Accessory Muscle Use] : no accessory muscle use [Clear to Auscultation] : lungs were clear to auscultation bilaterally [Normal Rate] : normal rate  [Regular Rhythm] : with a regular rhythm [Normal S1, S2] : normal S1 and S2 [No Murmur] : no murmur heard [Pedal Pulses Present] : the pedal pulses are present [No Edema] : there was no peripheral edema [Soft] : abdomen soft [Non Tender] : non-tender [Non-distended] : non-distended [No Masses] : no abdominal mass palpated [Normal Bowel Sounds] : normal bowel sounds [Normal Sphincter Tone] : normal sphincter tone [Normal Posterior Cervical Nodes] : no posterior cervical lymphadenopathy [Normal Anterior Cervical Nodes] : no anterior cervical lymphadenopathy [No CVA Tenderness] : no CVA  tenderness [No Spinal Tenderness] : no spinal tenderness [No Joint Swelling] : no joint swelling [Grossly Normal Strength/Tone] : grossly normal strength/tone [No Rash] : no rash [Coordination Grossly Intact] : coordination grossly intact [No Focal Deficits] : no focal deficits [Normal Gait] : normal gait [Deep Tendon Reflexes (DTR)] : deep tendon reflexes were 2+ and symmetric [Normal Affect] : the affect was normal [Normal Insight/Judgement] : insight and judgment were intact [FreeTextEntry1] : + external hemorrhoids

## 2023-08-12 NOTE — REVIEW OF SYSTEMS
[Abdominal Pain] : no abdominal pain [Constipation] : constipation [Vomiting] : no vomiting [Melena] : melraiza [Negative] : Heme/Lymph [FreeTextEntry7] : hemorrhoids

## 2023-08-12 NOTE — HISTORY OF PRESENT ILLNESS
[de-identified] : 67-year-old male with h/o Htn/Hld/BPH / s/p left adrenalectomy 2022 presents c/o painful hemorrhoids for 2 weeks, admits being constipated lately . He denies bleeding, abdominal pain . He uses  preparation H / sitz baths

## 2023-08-16 ENCOUNTER — TRANSCRIPTION ENCOUNTER (OUTPATIENT)
Age: 68
End: 2023-08-16

## 2023-08-18 ENCOUNTER — APPOINTMENT (OUTPATIENT)
Dept: INTERNAL MEDICINE | Facility: CLINIC | Age: 68
End: 2023-08-18

## 2023-08-18 ENCOUNTER — NON-APPOINTMENT (OUTPATIENT)
Age: 68
End: 2023-08-18

## 2023-08-22 ENCOUNTER — APPOINTMENT (OUTPATIENT)
Dept: GASTROENTEROLOGY | Facility: CLINIC | Age: 68
End: 2023-08-22

## 2023-08-23 ENCOUNTER — APPOINTMENT (OUTPATIENT)
Dept: CARDIOLOGY | Facility: CLINIC | Age: 68
End: 2023-08-23
Payer: MEDICARE

## 2023-08-23 ENCOUNTER — NON-APPOINTMENT (OUTPATIENT)
Age: 68
End: 2023-08-23

## 2023-08-23 ENCOUNTER — LABORATORY RESULT (OUTPATIENT)
Age: 68
End: 2023-08-23

## 2023-08-23 VITALS
BODY MASS INDEX: 27.25 KG/M2 | HEART RATE: 79 BPM | WEIGHT: 174 LBS | DIASTOLIC BLOOD PRESSURE: 71 MMHG | TEMPERATURE: 97.6 F | RESPIRATION RATE: 14 BRPM | SYSTOLIC BLOOD PRESSURE: 106 MMHG | OXYGEN SATURATION: 99 %

## 2023-08-23 DIAGNOSIS — R94.39 ABNORMAL RESULT OF OTHER CARDIOVASCULAR FUNCTION STUDY: ICD-10-CM

## 2023-08-23 DIAGNOSIS — R73.09 OTHER ABNORMAL GLUCOSE: ICD-10-CM

## 2023-08-23 PROCEDURE — 99213 OFFICE O/P EST LOW 20 MIN: CPT | Mod: 25

## 2023-08-23 PROCEDURE — 93000 ELECTROCARDIOGRAM COMPLETE: CPT

## 2023-08-23 NOTE — DISCUSSION/SUMMARY
[FreeTextEntry1] : The patient is a 67-year-old gentleman MS, HTN, s/p adrenalectomy, nonobstructive CAD, PVC who is feeling better.  #1 CV- + Calcium, nonobstructive. #2 HTN- c/w losartan 50mg  #3 PVC- c/w toprol 25mg, event monitor no significant VT #4 HLD- c/w rosuvastatin 5mg, repeat #5 MS- not on medication secondary to cost, does walk with cane for precaution but can walk. #6 General- Mediterranean diet recommended. Gardening and some walking but not eating right. [EKG obtained to assist in diagnosis and management of assessed problem(s)] : EKG obtained to assist in diagnosis and management of assessed problem(s)

## 2023-08-24 LAB
25(OH)D3 SERPL-MCNC: 27.7 NG/ML
ALBUMIN SERPL ELPH-MCNC: 4.6 G/DL
ALP BLD-CCNC: 83 U/L
ALT SERPL-CCNC: 15 U/L
ANION GAP SERPL CALC-SCNC: 15 MMOL/L
AST SERPL-CCNC: 16 U/L
BILIRUB SERPL-MCNC: 0.8 MG/DL
BUN SERPL-MCNC: 19 MG/DL
CALCIUM SERPL-MCNC: 9.5 MG/DL
CHLORIDE SERPL-SCNC: 102 MMOL/L
CHOLEST SERPL-MCNC: 135 MG/DL
CO2 SERPL-SCNC: 22 MMOL/L
CREAT SERPL-MCNC: 0.88 MG/DL
EGFR: 93 ML/MIN/1.73M2
ESTIMATED AVERAGE GLUCOSE: 117 MG/DL
GLUCOSE SERPL-MCNC: 90 MG/DL
HBA1C MFR BLD HPLC: 5.7 %
HDLC SERPL-MCNC: 52 MG/DL
LDLC SERPL CALC-MCNC: 65 MG/DL
NONHDLC SERPL-MCNC: 83 MG/DL
POTASSIUM SERPL-SCNC: 4.4 MMOL/L
PROT SERPL-MCNC: 6.7 G/DL
SODIUM SERPL-SCNC: 139 MMOL/L
TRIGL SERPL-MCNC: 95 MG/DL

## 2023-08-30 ENCOUNTER — APPOINTMENT (OUTPATIENT)
Dept: ORTHOPEDIC SURGERY | Facility: CLINIC | Age: 68
End: 2023-08-30
Payer: MEDICARE

## 2023-08-30 PROCEDURE — 99213 OFFICE O/P EST LOW 20 MIN: CPT | Mod: 25

## 2023-08-30 PROCEDURE — 20550 NJX 1 TENDON SHEATH/LIGAMENT: CPT | Mod: LT

## 2023-08-30 RX ORDER — LIDOCAINE HYDROCHLORIDE 10 MG/ML
1 INJECTION, SOLUTION INFILTRATION; PERINEURAL
Refills: 0 | Status: COMPLETED | OUTPATIENT
Start: 2023-08-30

## 2023-08-30 RX ORDER — BETAMETHA AC,SOD PHOS/WATER/PF 6 MG/ML
6 (3-3) VIAL (ML) INJECTION
Qty: 1 | Refills: 0 | Status: COMPLETED | OUTPATIENT
Start: 2023-08-30

## 2023-08-30 RX ADMIN — Medication %: at 00:00

## 2023-08-30 RX ADMIN — Medication MG/ML: at 00:00

## 2023-10-04 ENCOUNTER — APPOINTMENT (OUTPATIENT)
Dept: INTERNAL MEDICINE | Facility: CLINIC | Age: 68
End: 2023-10-04
Payer: MEDICARE

## 2023-10-04 VITALS
OXYGEN SATURATION: 98 % | WEIGHT: 177 LBS | HEIGHT: 67 IN | RESPIRATION RATE: 12 BRPM | DIASTOLIC BLOOD PRESSURE: 80 MMHG | HEART RATE: 91 BPM | SYSTOLIC BLOOD PRESSURE: 110 MMHG | BODY MASS INDEX: 27.78 KG/M2

## 2023-10-04 PROCEDURE — 99214 OFFICE O/P EST MOD 30 MIN: CPT | Mod: 25

## 2023-10-04 PROCEDURE — G0008: CPT

## 2023-10-04 PROCEDURE — 90662 IIV NO PRSV INCREASED AG IM: CPT

## 2023-10-04 RX ORDER — DICLOFENAC SODIUM 1 %
1 KIT TOPICAL
Qty: 1 | Refills: 0 | Status: DISCONTINUED | COMMUNITY
Start: 2020-11-25 | End: 2023-10-04

## 2023-10-04 RX ORDER — MOMETASONE 50 UG/1
50 SPRAY, METERED NASAL
Qty: 1 | Refills: 0 | Status: DISCONTINUED | COMMUNITY
Start: 2023-02-22 | End: 2023-10-04

## 2023-10-04 RX ORDER — DIAZEPAM 5 MG/1
5 TABLET ORAL
Qty: 2 | Refills: 0 | Status: DISCONTINUED | COMMUNITY
Start: 2022-11-18 | End: 2023-10-04

## 2023-10-04 RX ORDER — HYDROCORTISONE 2.5% 25 MG/G
2.5 CREAM TOPICAL
Qty: 1 | Refills: 0 | Status: DISCONTINUED | COMMUNITY
Start: 2023-08-11 | End: 2023-10-04

## 2023-10-16 ENCOUNTER — NON-APPOINTMENT (OUTPATIENT)
Age: 68
End: 2023-10-16

## 2023-10-30 ENCOUNTER — NON-APPOINTMENT (OUTPATIENT)
Age: 68
End: 2023-10-30

## 2023-10-30 ENCOUNTER — APPOINTMENT (OUTPATIENT)
Dept: OTOLARYNGOLOGY | Facility: CLINIC | Age: 68
End: 2023-10-30
Payer: MEDICARE

## 2023-10-30 VITALS
HEIGHT: 67 IN | BODY MASS INDEX: 29.98 KG/M2 | SYSTOLIC BLOOD PRESSURE: 128 MMHG | TEMPERATURE: 97.8 F | HEART RATE: 78 BPM | DIASTOLIC BLOOD PRESSURE: 86 MMHG | WEIGHT: 191 LBS

## 2023-10-30 DIAGNOSIS — H93.13 TINNITUS, BILATERAL: ICD-10-CM

## 2023-10-30 DIAGNOSIS — H90.3 SENSORINEURAL HEARING LOSS, BILATERAL: ICD-10-CM

## 2023-10-30 PROCEDURE — 92557 COMPREHENSIVE HEARING TEST: CPT

## 2023-10-30 PROCEDURE — 92567 TYMPANOMETRY: CPT

## 2023-10-30 PROCEDURE — 31231 NASAL ENDOSCOPY DX: CPT

## 2023-10-30 PROCEDURE — 99204 OFFICE O/P NEW MOD 45 MIN: CPT | Mod: 25

## 2023-11-13 ENCOUNTER — APPOINTMENT (OUTPATIENT)
Dept: PULMONOLOGY | Facility: CLINIC | Age: 68
End: 2023-11-13
Payer: MEDICARE

## 2023-11-13 VITALS
RESPIRATION RATE: 1 BRPM | BODY MASS INDEX: 28.04 KG/M2 | DIASTOLIC BLOOD PRESSURE: 77 MMHG | SYSTOLIC BLOOD PRESSURE: 126 MMHG | OXYGEN SATURATION: 97 % | TEMPERATURE: 97.7 F | WEIGHT: 179 LBS | HEART RATE: 69 BPM

## 2023-11-13 DIAGNOSIS — G47.33 OBSTRUCTIVE SLEEP APNEA (ADULT) (PEDIATRIC): ICD-10-CM

## 2023-11-13 DIAGNOSIS — Z87.442 PERSONAL HISTORY OF URINARY CALCULI: ICD-10-CM

## 2023-11-13 DIAGNOSIS — Z78.9 OTHER SPECIFIED HEALTH STATUS: ICD-10-CM

## 2023-11-13 DIAGNOSIS — Z86.018 PERSONAL HISTORY OF OTHER BENIGN NEOPLASM: ICD-10-CM

## 2023-11-13 PROCEDURE — 99204 OFFICE O/P NEW MOD 45 MIN: CPT

## 2023-11-14 ENCOUNTER — APPOINTMENT (OUTPATIENT)
Dept: ORTHOPEDIC SURGERY | Facility: CLINIC | Age: 68
End: 2023-11-14
Payer: MEDICARE

## 2023-11-14 VITALS — BODY MASS INDEX: 27.31 KG/M2 | HEIGHT: 67 IN | WEIGHT: 174 LBS

## 2023-11-14 DIAGNOSIS — M25.551 PAIN IN RIGHT HIP: ICD-10-CM

## 2023-11-14 PROCEDURE — 72170 X-RAY EXAM OF PELVIS: CPT

## 2023-11-14 PROCEDURE — 99214 OFFICE O/P EST MOD 30 MIN: CPT

## 2023-11-16 PROBLEM — M25.551 RIGHT HIP PAIN: Status: ACTIVE | Noted: 2023-10-04

## 2023-12-04 ENCOUNTER — APPOINTMENT (OUTPATIENT)
Dept: PULMONOLOGY | Facility: CLINIC | Age: 68
End: 2023-12-04
Payer: MEDICARE

## 2023-12-04 PROCEDURE — 94726 PLETHYSMOGRAPHY LUNG VOLUMES: CPT

## 2023-12-04 PROCEDURE — 94060 EVALUATION OF WHEEZING: CPT

## 2023-12-04 PROCEDURE — 94729 DIFFUSING CAPACITY: CPT

## 2023-12-20 ENCOUNTER — TRANSCRIPTION ENCOUNTER (OUTPATIENT)
Age: 68
End: 2023-12-20

## 2024-01-16 RX ORDER — LOSARTAN POTASSIUM 50 MG/1
50 TABLET, FILM COATED ORAL DAILY
Qty: 90 | Refills: 3 | Status: ACTIVE | COMMUNITY
Start: 1900-01-01 | End: 1900-01-01

## 2024-01-22 ENCOUNTER — APPOINTMENT (OUTPATIENT)
Dept: CARDIOLOGY | Facility: CLINIC | Age: 69
End: 2024-01-22
Payer: MEDICARE

## 2024-01-22 ENCOUNTER — NON-APPOINTMENT (OUTPATIENT)
Age: 69
End: 2024-01-22

## 2024-01-22 VITALS
HEIGHT: 67 IN | SYSTOLIC BLOOD PRESSURE: 131 MMHG | DIASTOLIC BLOOD PRESSURE: 82 MMHG | RESPIRATION RATE: 12 BRPM | BODY MASS INDEX: 27.62 KG/M2 | HEART RATE: 74 BPM | OXYGEN SATURATION: 99 % | TEMPERATURE: 97.2 F | WEIGHT: 176 LBS

## 2024-01-22 DIAGNOSIS — R07.89 OTHER CHEST PAIN: ICD-10-CM

## 2024-01-22 PROCEDURE — 99213 OFFICE O/P EST LOW 20 MIN: CPT | Mod: 25

## 2024-01-22 PROCEDURE — 93000 ELECTROCARDIOGRAM COMPLETE: CPT

## 2024-01-22 NOTE — ED PROVIDER NOTE - IV ALTEPLASE ADMIN OUTSIDE HIDDEN
Name: Charleen Bagley  YOB: 1956  Gender: female  MRN: [id number]    RIGHT Post-Op TKA annual follow up    This patient returns now 1 year s/p RIGHT TKA. The patient is doing well and is happy with their level of function. The patient states they are able to ascend stairs normally.  The patient ambulates with no assistive device. The patient takes anti-inflammatories or other pain medication rarely for discomfort related to the operative side.           Interval medical and surgical history is noted in the patient history form and updated today.      Past Medical History:    Allergies:  No Known Allergies    Medications:  Current Outpatient Medications   Medication Sig    venlafaxine (EFFEXOR XR) 75 MG extended release capsule TAKE 1 CAPSULE DAILY WITH ONE VENLAFAXINE 150 MG    venlafaxine (EFFEXOR XR) 150 MG extended release capsule Take 1 capsule by mouth daily Take with 75 mg dose daily    metoprolol succinate (TOPROL XL) 25 MG extended release tablet Take 1 tablet by mouth daily For BP    buPROPion (WELLBUTRIN XL) 150 MG extended release tablet Take 1 tablet by mouth every morning    atorvastatin (LIPITOR) 40 MG tablet Take 1 tablet by mouth daily For cholesterol    clopidogrel (PLAVIX) 75 MG tablet Take 1 tablet by mouth daily    Acetaminophen (TYLENOL EXTRA STRENGTH PO) Take by mouth    Cholecalciferol (VITAMIN D) 50 MCG (2000 UT) CAPS capsule Take 1 capsule by mouth daily    Multiple Vitamins-Minerals (CENTRUM SILVER) TABS Take 1 tablet by mouth daily     No current facility-administered medications for this visit.       Past Medical history:  Past Medical History:   Diagnosis Date    Anxiety and depression     Managed with meds    Arthritis     History of TIA (transient ischemic attack) 2008    Plavix daily    Hypercholesterolemia 3/28/2014    Insomnia     Morbid obesity (HCC)     bmi- 42.5    SCCA (squamous cell carcinoma) of skin         has a past surgical history that includes Total  show

## 2024-01-22 NOTE — DISCUSSION/SUMMARY
[FreeTextEntry1] : The patient is a 68-year-old gentleman MS, HTN, s/p adrenalectomy, CAD, PVC with limiting hip pain.   #1 CV- + Calcium, nonobstructive. #2 HTN- c/w losartan 50mg  #3 PVC- c/w toprol 25mg, event monitor no significant VT #4 HLD- c/w rosuvastatin 5mg, repeat labs today. #5 MS- not on medication secondary to cost, does walk with cane for precaution but can walk. #6 General- Mediterranean diet recommended. PT for hip pain. [EKG obtained to assist in diagnosis and management of assessed problem(s)] : EKG obtained to assist in diagnosis and management of assessed problem(s)

## 2024-01-25 LAB
25(OH)D3 SERPL-MCNC: 26.6 NG/ML
ALBUMIN SERPL ELPH-MCNC: 4.6 G/DL
ALP BLD-CCNC: 78 U/L
ALT SERPL-CCNC: 27 U/L
ANION GAP SERPL CALC-SCNC: 11 MMOL/L
AST SERPL-CCNC: 17 U/L
BILIRUB SERPL-MCNC: 0.8 MG/DL
BUN SERPL-MCNC: 19 MG/DL
CALCIUM SERPL-MCNC: 9.5 MG/DL
CHLORIDE SERPL-SCNC: 99 MMOL/L
CHOLEST SERPL-MCNC: 140 MG/DL
CO2 SERPL-SCNC: 27 MMOL/L
CREAT SERPL-MCNC: 1.08 MG/DL
EGFR: 74 ML/MIN/1.73M2
ESTIMATED AVERAGE GLUCOSE: 117 MG/DL
GLUCOSE SERPL-MCNC: 98 MG/DL
HBA1C MFR BLD HPLC: 5.7 %
HCT VFR BLD CALC: 45.2 %
HDLC SERPL-MCNC: 55 MG/DL
HGB BLD-MCNC: 13.8 G/DL
LDLC SERPL CALC-MCNC: 63 MG/DL
MCHC RBC-ENTMCNC: 19.5 PG
MCHC RBC-ENTMCNC: 30.5 GM/DL
MCV RBC AUTO: 63.9 FL
NONHDLC SERPL-MCNC: 84 MG/DL
PLATELET # BLD AUTO: 205 K/UL
POTASSIUM SERPL-SCNC: 4.5 MMOL/L
PROT SERPL-MCNC: 6.9 G/DL
RBC # BLD: 7.07 M/UL
RBC # FLD: 19.3 %
SODIUM SERPL-SCNC: 136 MMOL/L
TRIGL SERPL-MCNC: 120 MG/DL
WBC # FLD AUTO: 6 K/UL

## 2024-02-08 NOTE — PROGRESS NOTE ADULT - PROBLEM SELECTOR PLAN 2
show Patient w/ recent Rezum prostate procedure  Noted E. coli bacteremia, CTAP w/ prostatitis, cystitis  - F/u culture sensitivities  - Continue Zosyn (2/3- ), will consider extended course of PO abx to cover prostatitis  - Per urology, okay to dc louise; undergoing TOV  - UOP 2.7L o/n  - Trend WBC, fever curve, vitals Patient w/ recent Rezum prostate procedure  Noted E. coli bacteremia, CT-A/P w/ prostatitis, cystitis  - F/u culture sensitivities. c/w dysuria, urinary hesitancy  - d/c Zosyn, consider extended course of PO abx to cover prostatitis  - Per urology, okay to dc louise; undergoing TOV - UOP 2.7L o/n  - add Pyridium for dysuria  - Trend WBC, fever curve, vitals

## 2024-02-19 NOTE — REVIEW OF SYSTEMS
Documented by User:  RAMOS Mart  02/19/24 21:03

HPI - General Adult
General
Chief complaint: Nausea/Vomiting/Diarrhea
Stated complaint: nausea
Time Seen by Provider: 02/19/24 19:57
Source: patient
Source information: States started yesterday not feeling well. Nausea and lightheaded. Overall pain. States B/P was high
Mode of arrival: Wheelchair
History of Present Illness
HPI narrative: 
Patient is a 77-year-old female who presents to the emergency department for generally not feeling well since yesterday.  She has had subjective fever and chills, generalized body aches, cough and nasal congestion.  She denies chest pain, shortness 
of breath, abdominal pain.  She has had nausea but no vomiting or diarrhea.  Dates her son is also sick with a stuffy nose.  She states she was lightheaded and her blood pressure was elevated today. She takes Eliquis for history of A-fib.
Related Data
Home Medications

 Medication  Instructions  Recorded  Confirmed
apixaban 5 mg tablet (Eliquis) 5 mg PO Q12H 01/05/24 02/19/24
atorvastatin 40 mg tablet 40 mg PO .every night 01/05/24 02/19/24
cyclobenzaprine 10 mg tablet 10 mg PO Q12H 01/05/24 02/19/24
gabapentin 600 mg tablet 600 mg PO Q12H 01/05/24 02/19/24
hydrochlorothiazide 25 mg tablet 25 mg PO .every night 01/05/24 02/19/24
metoprolol tartrate 25 mg tablet 25 mg PO Q12H 01/05/24 02/19/24
tramadol 50 mg tablet 50 mg PO Q12H 01/05/24 01/05/24
valsartan 160 mg tablet 160 mg PO DAILY 01/05/24 02/19/24
brimonidine 0.2 %-timolol 0.5 % 1 drp ophthalmic (eye) Q12H 02/19/24 02/19/24
eye drops (Combigan)   
latanoprost 0.005 % eye drops 1 drp ophthalmic (eye) DAILY 02/19/24 02/19/24

Previous Rx's

 Medication  Instructions  Recorded
tramadol 50 mg tablet 50 mg PO Q8H PRN pain #14 tabs 01/05/24
azithromycin 250 mg tablet See Rx Instructions PO .COMPLEX #6 02/19/24
 tabs 
ondansetron 4 mg disintegrating 4 mg PO Q6H PRN nausea and 02/19/24
tablet vomiting #14 tabs 


Allergies

Allergy/AdvReac Type Severity Reaction Status Date / Time
No Known Drug Allergies Allergy   Verified 02/19/24 20:08



Review of Systems
ROS  
 Constitutional Reports: fever and chills   
 Ears, nose, mouth, and throat Reports: nasal congestion; Denies: throat pain   
 Cardiovascular Denies: chest pain   
 Respiratory Reports: cough; Denies: shortness of breath   
 Gastrointestinal Reports: nausea; Denies: abdominal pain, vomiting or diarrhea   
 Musculoskeletal Denies: back pain or neck pain   
 Integumentary/Breast Denies: rash   
 Neurological Reports: dizziness; Denies: headache   
 Hematologic/Lymphatic Reports: easy bruising and easy bleeding   

PFSH
PFS
Social History (Reviewed 02/19/24 @ 20:17 by RAMOS Mart)
Smoking status:  Never smoker 



Exam
Narrative
Exam Narrative: 
Gen.: Awake, alert, in no distress
Head: Normocephalic, atraumatic
ENT: Moist mucous membranes
Respiratory: No respiratory distress, lungs clear bilaterally
Cardio: Irregular, tachycardia
Gastrointestinal: Abdomen is soft, nondistended and nontender to palpation
Extremities: Moves extremities equally, no pedal edema
Psych: Normal mood and affect
Neuro: No focal neuro deficit
Skin: Warm, dry, intact

Constitutional
Vital Signs, click to edit/add: 

Last Vital Signs

Temp  100.2 F   02/19/24 21:24
Pulse  87   02/19/24 21:24
Resp  20   02/19/24 21:24
BP  170/100 H  02/19/24 21:24
Pulse Ox  98   02/19/24 21:24
O2 Del Method  Room Air  02/19/24 21:24




Course
Vital Signs
Vital signs: 

Vital Signs

Temperature  98.8 F   02/19/24 20:02
Pulse Rate  123 H  02/19/24 20:02
Respiratory Rate  16   02/19/24 20:02
Blood Pressure  176/108 H  02/19/24 20:02
Pulse Oximetry  97   02/19/24 20:02
Oxygen Delivery Method  Room Air  02/19/24 20:02



Temperature  100.2 F   02/19/24 21:24
Pulse Rate  87   02/19/24 21:24
Respiratory Rate  20   02/19/24 21:24
Blood Pressure  170/100 H  02/19/24 21:24
Pulse Oximetry  98   02/19/24 21:24
Oxygen Delivery Method  Room Air  02/19/24 21:24




Medical Decision Making
MDM Narrative
Medical decision making narrative: 
2102: Patient was ordered to have IV fluids, Tylenol for fever.  Septic workup was initiated including respiratory panel, urine specimen, chest x-ray.  These results are pending at this time although the patient is noted to have mild leukocytosis.  
Case is turned over to attending physician, she is stable at time of care transfer.
Medical Records
Medical records reviewed: Yes I reviewed the patient's medical records
Lab Data
Lab results reviewed: Yes I reviewed the patient's lab results
Labs: 

Lab Results

  02/19/24 02/19/24 02/19/24 Range/Units
  20:11 20:18 20:25 
WBC  15.9 H    (4.0-11.0)  10^3/uL
RBC  5.01    (4.20-5.40)  10^6/uL
Hgb  10.3 L    (12.0-16.0)  g/dL
Hct  33.4 L    (36.0-48.0)  %
MCV  66.7 L    (81.0-99.0)  fL
MCH  20.6 L    (26.7-34.0)  pg
MCHC  30.8    (29.9-35.2)  g/dL
RDW  15.4 H    (11.0-15.0)  %
Plt Count  345    (150-450)  10^3/uL
MPV  10.8    (9.5-13.5)  fL
Neut % (Auto)  83.9 H    (43.0-75.0)  %
Lymph % (Auto)  8.1 L    (20.5-60.0)  %
Mono % (Auto)  7.0    (1.7-12.0)  %
Eos % (Auto)  0.4 L    (0.9-7.0)  %
Baso % (Auto)  0.3    (0.2-2.0)  %
Neut # (Auto)  13.4 H    (1.4-6.5)  10^3/uL
Lymph # (Auto)  1.3    (1.2-3.8)  10^3/uL
Mono # (Auto)  1.1 H    (0.3-0.8)  10^3/uL
Eos # (Auto)  0.1    (0.0-0.7)  10^3/uL
Baso # (Auto)  0.1    (0.0-0.1)  10^3/uL
Abs Immat Gran (auto)  0.04 H    (0.00-0.03)  10^3/uL
Imm/Tot Granulo (auto)  0.3    (0.0-0.5)  %
PT  11.0    (9.0-11.6)  sec
INR  1.04    
VBG pH    7.464 H  (7.330-7.430)  
VBG pCO2    38.4 L  (40.0-52.0)  mmHg
Sodium  138    (136-145)  mmol/L
Potassium  3.6    (3.5-5.1)  mmol/L
Chloride  100    ()  mmol/L
Carbon Dioxide  27.6    (21.0-32.0)  mmol/L
Anion Gap  14.0    
BUN  22.0 H    (7.0-18.0)  mg/dL
Creatinine  1.14 H    (0.55-1.02)  mg/dL
Est GFR ( Amer)  56 L    (>=60)  
Est GFR (Non-Af Amer)  46 L    (>=60)  
BUN/Creatinine Ratio  19.3    
Glucose  109 H    ()  mg/dL
Lactate  0.9    (0.4-2.0)  mmol/L
Calcium  8.6    (8.5-10.1)  mg/dL
Total Bilirubin  1.6 H    (0.2-1.0)  mg/dL
AST  19    (15-37)  U/L
ALT  16    (14-59)  U/L
Alkaline Phosphatase  61    ()  U/L
Troponin I High Sens  15.5    (4.0-51.3)  pg/mL
NT-Pro-B Natriuret Pep  2329.0 H*    (<=1800.0)  pg/mL
Total Protein  7.0    (6.4-8.2)  g/dL
Albumin  3.5    (3.4-5.0)  g/dL
Globulin  3.5    g/dL
Albumin/Globulin Ratio  1.0    
Procalcitonin  <0.05    (0.00-0.50)  ng/mL
Urine Color     (YELLOW)  
Urine Clarity     (CLEAR)  
Urine pH     (5.0-9.0)  
Ur Specific Gravity     (1.005-1.025)  
Urine Protein     (NEG/TRACE)  mg/dL
Urine Glucose (UA)     (NEGATIVE)  mg/dL
Urine Ketones     (NEGATIVE)  mg/dL
Urine Occult Blood     (NEGATIVE)  
Urine Nitrite     (NEGATIVE)  
Urine Bilirubin     (NEGATIVE)  
Urine Urobilinogen     (0.2-1.0)  EU/dL
Ur Leukocyte Esterase     (NEGATIVE)  
Urine RBC     (0-2)  #/HPF
Urine WBC     (NONE SEEN)  #/HPF
Ur Squamous Epith Cells     (NONE/RARE)  #/LPF
Urine Crystals     (None Seen)  #/HPF
Urine Bacteria     (NONE SEEN)  #/HPF
Urine Casts     (NONE SEEN)  #/LPF
Urine Mucus     (NONE SEEN)  
Ur Culture Indicated?     
Adenovirus (PCR)   Not detected   (NOT DETECTE)  
C. pneumoniae DNA (PCR)   Not detected   (NOT DETECTE)  
Coronavirus Type OC43   Not detected   (NOT DETECTE)  
Coronavirus Type HKU1   Not detected   (NOT DETECTE)  
Coronavirus Type 229E   Not detected   (NOT DETECTE)  
Coronavirus Type NL63   Not detected   (NOT DETECTE)  
Human Metapneumovir PCR   Not detected   (NOT DETECTE)  
M. pneumoniae (PCR)   Not detected   (NOT DETECTE)  
Parainfluenza PCR   Not detected   (NOT DETECTE)  
Parainfluenza 2 (PCR)   Not detected   (NOT DETECTE)  
Parainfluenza 3 (PCR)   Not detected   (NOT DETECTE)  
Parainfluenza 4 (PCR)   Not detected   (NOT DETECTE)  
RSV (RT-PCR)   Not detected   (NOT DETECTE)  
Entero/Rhino (PCR)   Not detected   (NOT DETECTE)  
SARS-CoV-2 (PCR)   Not detected   (NOT DETECTE)  
Bordetella pertussis (PCR)   Not detected   (NOT DETECTE)  
B parapertussis DNA PCR   Not detected   (NOT DETECTE)  
Influenza Type A (PCR)   Not detected   (NOT DETECTE)  
Influenza Type B (PCR)   Not detected   (NOT DETECTE)  

  02/19/24 Range/Units
  21:36 
WBC   (4.0-11.0)  10^3/uL
RBC   (4.20-5.40)  10^6/uL
Hgb   (12.0-16.0)  g/dL
Hct   (36.0-48.0)  %
MCV   (81.0-99.0)  fL
MCH   (26.7-34.0)  pg
MCHC   (29.9-35.2)  g/dL
RDW   (11.0-15.0)  %
Plt Count   (150-450)  10^3/uL
MPV   (9.5-13.5)  fL
Neut % (Auto)   (43.0-75.0)  %
Lymph % (Auto)   (20.5-60.0)  %
Mono % (Auto)   (1.7-12.0)  %
Eos % (Auto)   (0.9-7.0)  %
Baso % (Auto)   (0.2-2.0)  %
Neut # (Auto)   (1.4-6.5)  10^3/uL
Lymph # (Auto)   (1.2-3.8)  10^3/uL
Mono # (Auto)   (0.3-0.8)  10^3/uL
Eos # (Auto)   (0.0-0.7)  10^3/uL
Baso # (Auto)   (0.0-0.1)  10^3/uL
Abs Immat Gran (auto)   (0.00-0.03)  10^3/uL
Imm/Tot Granulo (auto)   (0.0-0.5)  %
PT   (9.0-11.6)  sec
INR   
VBG pH   (7.330-7.430)  
VBG pCO2   (40.0-52.0)  mmHg
Sodium   (136-145)  mmol/L
Potassium   (3.5-5.1)  mmol/L
Chloride   ()  mmol/L
Carbon Dioxide   (21.0-32.0)  mmol/L
Anion Gap   
BUN   (7.0-18.0)  mg/dL
Creatinine   (0.55-1.02)  mg/dL
Est GFR ( Amer)   (>=60)  
Est GFR (Non-Af Amer)   (>=60)  
BUN/Creatinine Ratio   
Glucose   ()  mg/dL
Lactate   (0.4-2.0)  mmol/L
Calcium   (8.5-10.1)  mg/dL
Total Bilirubin   (0.2-1.0)  mg/dL
AST   (15-37)  U/L
ALT   (14-59)  U/L
Alkaline Phosphatase   ()  U/L
Troponin I High Sens   (4.0-51.3)  pg/mL
NT-Pro-B Natriuret Pep   (<=1800.0)  pg/mL
Total Protein   (6.4-8.2)  g/dL
Albumin   (3.4-5.0)  g/dL
Globulin   g/dL
Albumin/Globulin Ratio   
Procalcitonin   (0.00-0.50)  ng/mL
Urine Color  Lt. yellow  (YELLOW)  
Urine Clarity  Clear  (CLEAR)  
Urine pH  7.0  (5.0-9.0)  
Ur Specific Gravity  1.020  (1.005-1.025)  
Urine Protein  30 A  (NEG/TRACE)  mg/dL
Urine Glucose (UA)  Negative  (NEGATIVE)  mg/dL
Urine Ketones  Negative  (NEGATIVE)  mg/dL
Urine Occult Blood  Trace-i  (NEGATIVE)  
Urine Nitrite  Negative  (NEGATIVE)  
Urine Bilirubin  Negative  (NEGATIVE)  
Urine Urobilinogen  4.0 A  (0.2-1.0)  EU/dL
Ur Leukocyte Esterase  Trace A  (NEGATIVE)  
Urine RBC  0-2  (0-2)  #/HPF
Urine WBC  0-2 A  (NONE SEEN)  #/HPF
Ur Squamous Epith Cells  Rare  (NONE/RARE)  #/LPF
Urine Crystals  None seen  (None Seen)  #/HPF
Urine Bacteria  None seen  (NONE SEEN)  #/HPF
Urine Casts  None seen  (NONE SEEN)  #/LPF
Urine Mucus  None seen  (NONE SEEN)  
Ur Culture Indicated?  No  
Adenovirus (PCR)   (NOT DETECTE)  
C. pneumoniae DNA (PCR)   (NOT DETECTE)  
Coronavirus Type OC43   (NOT DETECTE)  
Coronavirus Type HKU1   (NOT DETECTE)  
Coronavirus Type 229E   (NOT DETECTE)  
Coronavirus Type NL63   (NOT DETECTE)  
Human Metapneumovir PCR   (NOT DETECTE)  
M. pneumoniae (PCR)   (NOT DETECTE)  
Parainfluenza PCR   (NOT DETECTE)  
Parainfluenza 2 (PCR)   (NOT DETECTE)  
Parainfluenza 3 (PCR)   (NOT DETECTE)  
Parainfluenza 4 (PCR)   (NOT DETECTE)  
RSV (RT-PCR)   (NOT DETECTE)  
Entero/Rhino (PCR)   (NOT DETECTE)  
SARS-CoV-2 (PCR)   (NOT DETECTE)  
Bordetella pertussis (PCR)   (NOT DETECTE)  
B parapertussis DNA PCR   (NOT DETECTE)  
Influenza Type A (PCR)   (NOT DETECTE)  
Influenza Type B (PCR)   (NOT DETECTE)  



Imaging Data
Chest x-ray: 
      Attestation: I have reviewed the pertinent imaging results.
      Radiologist's impression: 

ITS Impressions

Chest X-Ray  02/19/24 20:10
IMPRESSION:
 
No acute pulmonary findings. 
 
 
Electronically authenticated by: DEVONTE MCDONNELL   Date: 2/19/2024  21:00



Procedure:  XR chest 1V
EXAM: XR chest
 1V 
HISTORY: Weakness
COMPARISON: None. 
TECHNIQUE: Single frontal view of the chest. Rightward rotation.
FINDINGS:
Tubes/lines/devices: None.
Lungs: Adequate inflation.
 No
 evidence
 of pneumonia
 or pulmonary edema.
Pleura: No pneumothorax. No pleural effusion.
Heart and
 mediastinum: No enlargement
 of
 the cardiomediastinal silhouette. 
Apparent thickening of the
 right paratracheal stripe, likely artifactual
 given
right rotation. Tortuous aorta.
Bones/soft tissues:
 No acute
 osseous findings. Unremarkable soft tissues.
Abdomen: Unremarkable.
IMPRESSION:
No acute pulmonary findings. 
ECG Data
Attestation: I personally reviewed and interpreted this ECG as follows: (Atrial fibrillation with rapid ventricular response at a rate of 105, no acute ST elevation or ectopy.  EKG reviewed by attending physician)

Discharge Plan
Discharge
Chief Complaint: Nausea/Vomiting/Diarrhea

Clinical Impression:
 Upper respiratory infection, Fever, HTN (hypertension)


Patient Disposition: Home, Self-Care

Time of Disposition Decision: 22:07

Prescriptions / Home Meds:
New
  azithromycin 250 mg tablet 
   See Rx Instructions  .ROUTE .COMPLEX Qty: 6 0RF
   Rx Instructions:
   For 250 mg dose pack: take 500 mg today (day 1), then 250 mg for 4 days (days 2-5)
  ondansetron 4 mg tablet,disintegrating 
   4 mg PO Q6H PRN (Reason: nausea and vomiting) Qty: 14 0RF

No Action
  Eliquis 5 mg tablet 
   5 mg PO Q12H 
  atorvastatin 40 mg tablet 
   40 mg PO .every night 
  cyclobenzaprine 10 mg tablet 
   10 mg PO Q12H 
  gabapentin 600 mg tablet 
   600 mg PO Q12H 
  hydrochlorothiazide 25 mg tablet 
   25 mg PO .every night 
  metoprolol tartrate 25 mg tablet 
   25 mg PO Q12H 
  tramadol 50 mg tablet 
   50 mg PO Q12H 
  valsartan 160 mg tablet 
   160 mg PO DAILY 
  tramadol 50 mg tablet 
   50 mg PO Q8H PRN (Reason: pain) Qty: 14 0RF
  brimonidine-timolol [Combigan] 0.2-0.5 % drops 
   1 drp OPHTHALMIC (EYE) Q12H 
   Rx Instructions:
   Both eyes
  latanoprost 0.005 % drops 
   1 drp OPHTHALMIC (EYE) DAILY 
   Rx Instructions:
   PM

Instructions:  Fever in Adults (ED), Upper Respiratory Infection (ED), Hypertension (ED)

Stand Alone Forms:  Portal Instructions

Referrals:
VIOLA BRITO [Primary Care Provider] - 1 week


___________________________________________________________________________

Documented by User:  Fabian Arpit  02/19/24 22:10

HPI - General Adult
General
Chief complaint: Nausea/Vomiting/Diarrhea
Stated complaint: nausea
Time Seen by Provider: 02/19/24 19:57
Related Data
Home Medications

 Medication  Instructions  Recorded  Confirmed
apixaban 5 mg tablet (Eliquis) 5 mg PO Q12H 01/05/24 02/19/24
atorvastatin 40 mg tablet 40 mg PO .every night 01/05/24 02/19/24
cyclobenzaprine 10 mg tablet 10 mg PO Q12H 01/05/24 02/19/24
gabapentin 600 mg tablet 600 mg PO Q12H 01/05/24 02/19/24
hydrochlorothiazide 25 mg tablet 25 mg PO .every night 01/05/24 02/19/24
metoprolol tartrate 25 mg tablet 25 mg PO Q12H 01/05/24 02/19/24
tramadol 50 mg tablet 50 mg PO Q12H 01/05/24 01/05/24
valsartan 160 mg tablet 160 mg PO DAILY 01/05/24 02/19/24
brimonidine 0.2 %-timolol 0.5 % 1 drp ophthalmic (eye) Q12H 02/19/24 02/19/24
eye drops (Combigan)   
latanoprost 0.005 % eye drops 1 drp ophthalmic (eye) DAILY 02/19/24 02/19/24

Previous Rx's

 Medication  Instructions  Recorded
tramadol 50 mg tablet 50 mg PO Q8H PRN pain #14 tabs 01/05/24
azithromycin 250 mg tablet See Rx Instructions PO .COMPLEX #6 02/19/24
 tabs 
ondansetron 4 mg disintegrating 4 mg PO Q6H PRN nausea and 02/19/24
tablet vomiting #14 tabs 


Allergies

Allergy/AdvReac Type Severity Reaction Status Date / Time
No Known Drug Allergies Allergy   Verified 02/19/24 20:08



[facility]
Social History (Reviewed 02/19/24 @ 20:17 by RAMOS Mart)
Smoking status:  Never smoker 



Exam
Constitutional
Vital Signs, click to edit/add: 

Last Vital Signs

Temp  100.2 F   02/19/24 21:24
Pulse  87   02/19/24 21:24
Resp  20   02/19/24 21:24
BP  170/100 H  02/19/24 21:24
Pulse Ox  98   02/19/24 21:24
O2 Del Method  Room Air  02/19/24 21:24




Course
Vital Signs
Vital signs: 

Vital Signs

Temperature  98.8 F   02/19/24 20:02
Pulse Rate  123 H  02/19/24 20:02
Respiratory Rate  16   02/19/24 20:02
Blood Pressure  176/108 H  02/19/24 20:02
Pulse Oximetry  97   02/19/24 20:02
Oxygen Delivery Method  Room Air  02/19/24 20:02



Temperature  100.2 F   02/19/24 21:24
Pulse Rate  87   02/19/24 21:24
Respiratory Rate  20   02/19/24 21:24
Blood Pressure  170/100 H  02/19/24 21:24
Pulse Oximetry  98   02/19/24 21:24
Oxygen Delivery Method  Room Air  02/19/24 21:24




Medical Decision Making
[facility] Narrative
Medical decision making narrative: 
2102: Patient was ordered to have IV fluids, Tylenol for fever.  Septic workup was initiated including respiratory panel, urine specimen, chest x-ray.  These results are pending at this time although the patient is noted to have mild leukocytosis.  
Case is turned over to attending physician, she is stable at time of care transfer.

Attending physician note -I saw and examined the patient I talked to her about her complaint.  BNP elevated but she has no central or peripheral sign of failure on examination.  Her nausea is resolved after ED treatment and she feels better overall 
after receiving normal saline IV fluid.  WBC elevated at 15k but normal lactate, negative UA, negative CXR and negative respiratory panel.  Her blood pressure is still elevated here in the emergency department - 170/100.  I ordered her to receive IV 
Labetalol before discharge.  She said that she will take her BP meds as prescribed, which included metoprolol. This should help with rate control for her Afib.  
For this patient encounter I reviewed the mid-level provider?s documentation, medical decision-making and treatment plan, and I personally spent time with this patient.
Shared APC visit, physician attestation: Face-to-face: This visit was performed by both a physician and an APC. I personally evaluated and examined the patient. I performed all aspects of MDM as documented.
- DO Ifrah
Lab Data
Labs: 

Lab Results

  02/19/24 02/19/24 02/19/24 Range/Units
  20:11 20:18 20:25 
WBC  15.9 H    (4.0-11.0)  10^3/uL
RBC  5.01    (4.20-5.40)  10^6/uL
Hgb  10.3 L    (12.0-16.0)  g/dL
Hct  33.4 L    (36.0-48.0)  %
MCV  66.7 L    (81.0-99.0)  fL
MCH  20.6 L    (26.7-34.0)  pg
MCHC  30.8    (29.9-35.2)  g/dL
RDW  15.4 H    (11.0-15.0)  %
Plt Count  345    (150-450)  10^3/uL
MPV  10.8    (9.5-13.5)  fL
Neut % (Auto)  83.9 H    (43.0-75.0)  %
Lymph % (Auto)  8.1 L    (20.5-60.0)  %
Mono % (Auto)  7.0    (1.7-12.0)  %
Eos % (Auto)  0.4 L    (0.9-7.0)  %
Baso % (Auto)  0.3    (0.2-2.0)  %
Neut # (Auto)  13.4 H    (1.4-6.5)  10^3/uL
Lymph # (Auto)  1.3    (1.2-3.8)  10^3/uL
Mono # (Auto)  1.1 H    (0.3-0.8)  10^3/uL
Eos # (Auto)  0.1    (0.0-0.7)  10^3/uL
Baso # (Auto)  0.1    (0.0-0.1)  10^3/uL
Abs Immat Gran (auto)  0.04 H    (0.00-0.03)  10^3/uL
Imm/Tot Granulo (auto)  0.3    (0.0-0.5)  %
PT  11.0    (9.0-11.6)  sec
INR  1.04    
VBG pH    7.464 H  (7.330-7.430)  
VBG pCO2    38.4 L  (40.0-52.0)  mmHg
Sodium  138    (136-145)  mmol/L
Potassium  3.6    (3.5-5.1)  mmol/L
Chloride  100    ()  mmol/L
Carbon Dioxide  27.6    (21.0-32.0)  mmol/L
Anion Gap  14.0    
BUN  22.0 H    (7.0-18.0)  mg/dL
Creatinine  1.14 H    (0.55-1.02)  mg/dL
Est GFR ( Amer)  56 L    (>=60)  
Est GFR (Non-Af Amer)  46 L    (>=60)  
BUN/Creatinine Ratio  19.3    
Glucose  109 H    ()  mg/dL
Lactate  0.9    (0.4-2.0)  mmol/L
Calcium  8.6    (8.5-10.1)  mg/dL
Total Bilirubin  1.6 H    (0.2-1.0)  mg/dL
AST  19    (15-37)  U/L
ALT  16    (14-59)  U/L
Alkaline Phosphatase  61    ()  U/L
Troponin I High Sens  15.5    (4.0-51.3)  pg/mL
NT-Pro-B Natriuret Pep  2329.0 H*    (<=1800.0)  pg/mL
Total Protein  7.0    (6.4-8.2)  g/dL
Albumin  3.5    (3.4-5.0)  g/dL
Globulin  3.5    g/dL
Albumin/Globulin Ratio  1.0    
Procalcitonin  <0.05    (0.00-0.50)  ng/mL
Urine Color     (YELLOW)  
Urine Clarity     (CLEAR)  
Urine pH     (5.0-9.0)  
Ur Specific Gravity     (1.005-1.025)  
Urine Protein     (NEG/TRACE)  mg/dL
Urine Glucose (UA)     (NEGATIVE)  mg/dL
Urine Ketones     (NEGATIVE)  mg/dL
Urine Occult Blood     (NEGATIVE)  
Urine Nitrite     (NEGATIVE)  
Urine Bilirubin     (NEGATIVE)  
Urine Urobilinogen     (0.2-1.0)  EU/dL
Ur Leukocyte Esterase     (NEGATIVE)  
Urine RBC     (0-2)  #/HPF
Urine WBC     (NONE SEEN)  #/HPF
Ur Squamous Epith Cells     (NONE/RARE)  #/LPF
Urine Crystals     (None Seen)  #/HPF
Urine Bacteria     (NONE SEEN)  #/HPF
Urine Casts     (NONE SEEN)  #/LPF
Urine Mucus     (NONE SEEN)  
Ur Culture Indicated?     
Adenovirus (PCR)   Not detected   (NOT DETECTE)  
C. pneumoniae DNA (PCR)   Not detected   (NOT DETECTE)  
Coronavirus Type OC43   Not detected   (NOT DETECTE)  
Coronavirus Type HKU1   Not detected   (NOT DETECTE)  
Coronavirus Type 229E   Not detected   (NOT DETECTE)  
Coronavirus Type NL63   Not detected   (NOT DETECTE)  
Human Metapneumovir PCR   Not detected   (NOT DETECTE)  
M. pneumoniae (PCR)   Not detected   (NOT DETECTE)  
Parainfluenza PCR   Not detected   (NOT DETECTE)  
Parainfluenza 2 (PCR)   Not detected   (NOT DETECTE)  
Parainfluenza 3 (PCR)   Not detected   (NOT DETECTE)  
Parainfluenza 4 (PCR)   Not detected   (NOT DETECTE)  
RSV (RT-PCR)   Not detected   (NOT DETECTE)  
Entero/Rhino (PCR)   Not detected   (NOT DETECTE)  
SARS-CoV-2 (PCR)   Not detected   (NOT DETECTE)  
Bordetella pertussis (PCR)   Not detected   (NOT DETECTE)  
B parapertussis DNA PCR   Not detected   (NOT DETECTE)  
Influenza Type A (PCR)   Not detected   (NOT DETECTE)  
Influenza Type B (PCR)   Not detected   (NOT DETECTE)  

  02/19/24 Range/Units
  21:36 
WBC   (4.0-11.0)  10^3/uL
RBC   (4.20-5.40)  10^6/uL
Hgb   (12.0-16.0)  g/dL
Hct   (36.0-48.0)  %
MCV   (81.0-99.0)  fL
MCH   (26.7-34.0)  pg
MCHC   (29.9-35.2)  g/dL
RDW   (11.0-15.0)  %
Plt Count   (150-450)  10^3/uL
MPV   (9.5-13.5)  fL
Neut % (Auto)   (43.0-75.0)  %
Lymph % (Auto)   (20.5-60.0)  %
Mono % (Auto)   (1.7-12.0)  %
Eos % (Auto)   (0.9-7.0)  %
Baso % (Auto)   (0.2-2.0)  %
Neut # (Auto)   (1.4-6.5)  10^3/uL
Lymph # (Auto)   (1.2-3.8)  10^3/uL
Mono # (Auto)   (0.3-0.8)  10^3/uL
Eos # (Auto)   (0.0-0.7)  10^3/uL
Baso # (Auto)   (0.0-0.1)  10^3/uL
Abs Immat Gran (auto)   (0.00-0.03)  10^3/uL
Imm/Tot Granulo (auto)   (0.0-0.5)  %
PT   (9.0-11.6)  sec
INR   
VBG pH   (7.330-7.430)  
VBG pCO2   (40.0-52.0)  mmHg
Sodium   (136-145)  mmol/L
Potassium   (3.5-5.1)  mmol/L
Chloride   ()  mmol/L
Carbon Dioxide   (21.0-32.0)  mmol/L
Anion Gap   
BUN   (7.0-18.0)  mg/dL
Creatinine   (0.55-1.02)  mg/dL
Est GFR ( Amer)   (>=60)  
Est GFR (Non-Af Amer)   (>=60)  
BUN/Creatinine Ratio   
Glucose   ()  mg/dL
Lactate   (0.4-2.0)  mmol/L
Calcium   (8.5-10.1)  mg/dL
Total Bilirubin   (0.2-1.0)  mg/dL
AST   (15-37)  U/L
ALT   (14-59)  U/L
Alkaline Phosphatase   ()  U/L
Troponin I High Sens   (4.0-51.3)  pg/mL
NT-Pro-B Natriuret Pep   (<=1800.0)  pg/mL
Total Protein   (6.4-8.2)  g/dL
Albumin   (3.4-5.0)  g/dL
Globulin   g/dL
Albumin/Globulin Ratio   
Procalcitonin   (0.00-0.50)  ng/mL
Urine Color  Lt. yellow  (YELLOW)  
Urine Clarity  Clear  (CLEAR)  
Urine pH  7.0  (5.0-9.0)  
Ur Specific Gravity  1.020  (1.005-1.025)  
Urine Protein  30 A  (NEG/TRACE)  mg/dL
Urine Glucose (UA)  Negative  (NEGATIVE)  mg/dL
Urine Ketones  Negative  (NEGATIVE)  mg/dL
Urine Occult Blood  Trace-i  (NEGATIVE)  
Urine Nitrite  Negative  (NEGATIVE)  
Urine Bilirubin  Negative  (NEGATIVE)  
Urine Urobilinogen  4.0 A  (0.2-1.0)  EU/dL
Ur Leukocyte Esterase  Trace A  (NEGATIVE)  
Urine RBC  0-2  (0-2)  #/HPF
Urine WBC  0-2 A  (NONE SEEN)  #/HPF
Ur Squamous Epith Cells  Rare  (NONE/RARE)  #/LPF
Urine Crystals  None seen  (None Seen)  #/HPF
Urine Bacteria  None seen  (NONE SEEN)  #/HPF
Urine Casts  None seen  (NONE SEEN)  #/LPF
Urine Mucus  None seen  (NONE SEEN)  
Ur Culture Indicated?  No  
Adenovirus (PCR)   (NOT DETECTE)  
C. pneumoniae DNA (PCR)   (NOT DETECTE)  
Coronavirus Type OC43   (NOT DETECTE)  
Coronavirus Type HKU1   (NOT DETECTE)  
Coronavirus Type 229E   (NOT DETECTE)  
Coronavirus Type NL63   (NOT DETECTE)  
Human Metapneumovir PCR   (NOT DETECTE)  
M. pneumoniae (PCR)   (NOT DETECTE)  
Parainfluenza PCR   (NOT DETECTE)  
Parainfluenza 2 (PCR)   (NOT DETECTE)  
Parainfluenza 3 (PCR)   (NOT DETECTE)  
Parainfluenza 4 (PCR)   (NOT DETECTE)  
RSV (RT-PCR)   (NOT DETECTE)  
Entero/Rhino (PCR)   (NOT DETECTE)  
SARS-CoV-2 (PCR)   (NOT DETECTE)  
Bordetella pertussis (PCR)   (NOT DETECTE)  
B parapertussis DNA PCR   (NOT DETECTE)  
Influenza Type A (PCR)   (NOT DETECTE)  
Influenza Type B (PCR)   (NOT DETECTE)  



Imaging Data
Chest x-ray: 
      Radiologist's impression: 

ITS Impressions

Chest X-Ray  02/19/24 20:10
IMPRESSION:
 
No acute pulmonary findings. 
 
 
Electronically authenticated by: DEVONTE MCDONNELL   Date: 2/19/2024  21:00





Discharge Plan
Discharge
Chief Complaint: Nausea/Vomiting/Diarrhea

Clinical Impression:
 Upper respiratory infection, Fever, HTN (hypertension)


Patient Disposition: Home, Self-Care

Time of Disposition Decision: 22:07

Prescriptions / Home Meds:
New
  azithromycin 250 mg tablet 
   See Rx Instructions  .ROUTE .COMPLEX Qty: 6 0RF
   Rx Instructions:
   For 250 mg dose pack: take 500 mg today (day 1), then 250 mg for 4 days (days 2-5)
  ondansetron 4 mg tablet,disintegrating 
   4 mg PO Q6H PRN (Reason: nausea and vomiting) Qty: 14 0RF

No Action
  Eliquis 5 mg tablet 
   5 mg PO Q12H 
  atorvastatin 40 mg tablet 
   40 mg PO .every night 
  cyclobenzaprine 10 mg tablet 
   10 mg PO Q12H 
  gabapentin 600 mg tablet 
   600 mg PO Q12H 
  hydrochlorothiazide 25 mg tablet 
   25 mg PO .every night 
  metoprolol tartrate 25 mg tablet 
   25 mg PO Q12H 
  tramadol 50 mg tablet 
   50 mg PO Q12H 
  valsartan 160 mg tablet 
   160 mg PO DAILY 
  tramadol 50 mg tablet 
   50 mg PO Q8H PRN (Reason: pain) Qty: 14 0RF
  brimonidine-timolol [Combigan] 0.2-0.5 % drops 
   1 drp OPHTHALMIC (EYE) Q12H 
   Rx Instructions:
   Both eyes
  latanoprost 0.005 % drops 
   1 drp OPHTHALMIC (EYE) DAILY 
   Rx Instructions:
   PM

Instructions:  Fever in Adults (ED), Upper Respiratory Infection (ED), Hypertension (ED)

Stand Alone Forms:  Portal Instructions

Referrals:
VIOLA BRITO [Primary Care Provider] - 1 week [Shortness Of Breath] : shortness of breath [Dyspnea on exertion] : dyspnea during exertion [Chest Pain] : chest pain [Lower Ext Edema] : no extremity edema [Palpitations] : palpitations [Negative] : Heme/Lymph

## 2024-03-01 ENCOUNTER — NON-APPOINTMENT (OUTPATIENT)
Age: 69
End: 2024-03-01

## 2024-03-02 NOTE — PATIENT PROFILE ADULT - NS PRO AD NO ADVANCE DIRECTIVE
RT Protocol Note  Moriah Plamersert 70 y.o. female MRN: [id number]  Unit/Bed#: -01 Encounter: [id number]    Assessment    Principal Problem:    Acute respiratory failure with hypoxia (HCC)  Active Problems:    Malignant neoplasm of urinary bladder, unspecified site (HCC)    Chronic obstructive pulmonary disease with acute lower respiratory infection (HCC)    CPAP (continuous positive airway pressure) dependence    Community acquired pneumonia of left lower lobe of lung    PRESTON (acute kidney injury) ([facility])      Home Pulmonary Medications:  trelegy  Home Devices/Therapy: BiPAP/CPAP    Past Medical History:   Diagnosis Date    Acute UTI 2014    Arthritis 2016    Bladder cancer (HCC)     COPD (chronic obstructive pulmonary disease) ([facility]) 2016    CPAP (continuous positive airway pressure) dependence     Dependence on nicotine from cigarettes 2016    Depression 2016    Dupuytren's disease of palm 2018    Dyspnea on exertion     GERD (gastroesophageal reflux disease)     Heart murmur     Hyperlipidemia     Hypertension     Hypokalemia 2016    Left ventricular hypertrophy     Mixed stress and urge urinary incontinence     Non-rheumatic mitral regurgitation 2023    Osteoarthritis 2016    left knee    Osteopenia     Panlobular emphysema (HCC)     Recurrent UTI     RLS (restless legs syndrome) 2016    Sleep apnea     Sleep apnea 2023     Social History     Socioeconomic History    Marital status: /Civil Union     Spouse name: None    Number of children: None    Years of education: None    Highest education level: None   Occupational History    None   Tobacco Use    Smoking status: Every Day     Current packs/day: 1.00     Average packs/day: 1 pack/day for 50.0 years (50.0 ttl pk-yrs)     Types: Cigarettes    Smokeless tobacco: Never   Vaping Use    Vaping status: Never Used   Substance and Sexual Activity    Alcohol use: Yes     Alcohol/week: 2.0 standard drinks of alcohol     Types: 2 Standard drinks or equivalent  "per week     Comment: Occasionally    Drug use: Not Currently    Sexual activity: None   Other Topics Concern    None   Social History Narrative    None     Social Determinants of Health     Financial Resource Strain: Not on file   Food Insecurity: Not on file   Transportation Needs: Not on file   Physical Activity: Not on file   Stress: Not on file   Social Connections: Not on file   Intimate Partner Violence: Not on file   Housing Stability: Not on file       Subjective         Objective    Physical Exam:   Assessment Type: Pre-treatment  General Appearance: Alert, Awake  Respiratory Pattern: Normal  Chest Assessment: Chest expansion symmetrical  Bilateral Breath Sounds: Diminished, Coarse  Cough: Non-productive  O2 Device: 2L nasal cannula    Vitals:  Blood pressure 120/59, pulse 61, temperature (!) 97.3 °F (36.3 °C), resp. rate 15, height 5' 4\" (1.626 m), weight 90.4 kg (199 lb 6.4 oz), SpO2 90%.          Imaging and other studies:    Left lower lobe atelectasis versus infiltrate.   Pneumonia is not excluded.    O2 Device: 2L nasal cannula     Plan    Respiratory Plan: Mild Distress pathway        Resp Comments: stable on 2L   " No

## 2024-03-11 ENCOUNTER — APPOINTMENT (OUTPATIENT)
Dept: PULMONOLOGY | Facility: CLINIC | Age: 69
End: 2024-03-11
Payer: MEDICARE

## 2024-03-11 VITALS
HEIGHT: 67 IN | BODY MASS INDEX: 27.78 KG/M2 | SYSTOLIC BLOOD PRESSURE: 115 MMHG | RESPIRATION RATE: 14 BRPM | DIASTOLIC BLOOD PRESSURE: 78 MMHG | WEIGHT: 177 LBS | HEART RATE: 85 BPM | OXYGEN SATURATION: 97 %

## 2024-03-11 DIAGNOSIS — Z86.018 PERSONAL HISTORY OF OTHER BENIGN NEOPLASM: ICD-10-CM

## 2024-03-11 DIAGNOSIS — Z83.719 FAMILY HISTORY OF COLON POLYPS, UNSPECIFIED: ICD-10-CM

## 2024-03-11 DIAGNOSIS — Z80.0 FAMILY HISTORY OF MALIGNANT NEOPLASM OF DIGESTIVE ORGANS: ICD-10-CM

## 2024-03-11 DIAGNOSIS — Z78.9 OTHER SPECIFIED HEALTH STATUS: ICD-10-CM

## 2024-03-11 PROCEDURE — 99213 OFFICE O/P EST LOW 20 MIN: CPT

## 2024-03-11 NOTE — HISTORY OF PRESENT ILLNESS
[Former] : former [Never] : never [TextBox_4] : Patient is a 68-year-old male past medical history significant for severe obstructive sleep apnea currently on CPAP who presents today for follow-up.  The patient complains of awakening with a dry mouth.  He thinks that his pressure is too much.  He currently denies any recent fevers chills chest pain weight loss or hemoptysis.

## 2024-03-11 NOTE — PHYSICAL EXAM
[No Acute Distress] : no acute distress [Normal Oropharynx] : normal oropharynx [III] : Mallampati Class: III [Normal Appearance] : normal appearance [No Neck Mass] : no neck mass [Normal Rate/Rhythm] : normal rate/rhythm [Normal S1, S2] : normal s1, s2 [No Murmurs] : no murmurs [No Resp Distress] : no resp distress [Clear to Auscultation Bilaterally] : clear to auscultation bilaterally [No Abnormalities] : no abnormalities [Benign] : benign [Normal Gait] : normal gait [No Cyanosis] : no cyanosis [No Clubbing] : no clubbing [No Edema] : no edema [FROM] : FROM [Normal Color/ Pigmentation] : normal color/ pigmentation [No Focal Deficits] : no focal deficits [Oriented x3] : oriented x3 [Normal Affect] : normal affect

## 2024-03-11 NOTE — ASSESSMENT
[FreeTextEntry1] : In summary the patient is a 68-year-old male past medical history significant for BPH high cholesterol coronary artery disease, severe obstructive sleep apnea currently on CPAP who presents for follow-up.  I reviewed the patient's pressures with him I also reviewed his current.  I have encouraged him to use a chinstrap while sleeping.  We have agreed to decrease his pressure to 6.  If going forward his apnea hypopnea index deteriorates he will be referred for an in lab sleep study.  The patient's apnea hypopnea index currently rests at 5.

## 2024-03-13 ENCOUNTER — APPOINTMENT (OUTPATIENT)
Dept: INTERNAL MEDICINE | Facility: CLINIC | Age: 69
End: 2024-03-13
Payer: MEDICARE

## 2024-03-13 ENCOUNTER — NON-APPOINTMENT (OUTPATIENT)
Age: 69
End: 2024-03-13

## 2024-03-13 VITALS
RESPIRATION RATE: 14 BRPM | WEIGHT: 177 LBS | OXYGEN SATURATION: 97 % | SYSTOLIC BLOOD PRESSURE: 119 MMHG | TEMPERATURE: 97.6 F | BODY MASS INDEX: 27.72 KG/M2 | HEART RATE: 73 BPM | DIASTOLIC BLOOD PRESSURE: 73 MMHG

## 2024-03-13 DIAGNOSIS — R09.82 POSTNASAL DRIP: ICD-10-CM

## 2024-03-13 DIAGNOSIS — G35 MULTIPLE SCLEROSIS: ICD-10-CM

## 2024-03-13 DIAGNOSIS — Z87.898 PERSONAL HISTORY OF OTHER SPECIFIED CONDITIONS: ICD-10-CM

## 2024-03-13 PROCEDURE — 99214 OFFICE O/P EST MOD 30 MIN: CPT

## 2024-03-13 NOTE — PHYSICAL EXAM
[Well Nourished] : well nourished [No Acute Distress] : no acute distress [Well Developed] : well developed [Normal Sclera/Conjunctiva] : normal sclera/conjunctiva [Well-Appearing] : well-appearing [Normal Outer Ear/Nose] : the outer ears and nose were normal in appearance [Normal Oropharynx] : the oropharynx was normal [No JVD] : no jugular venous distention [No Lymphadenopathy] : no lymphadenopathy [Supple] : supple [No Accessory Muscle Use] : no accessory muscle use [No Respiratory Distress] : no respiratory distress  [Clear to Auscultation] : lungs were clear to auscultation bilaterally [Normal Rate] : normal rate  [Regular Rhythm] : with a regular rhythm [Normal S1, S2] : normal S1 and S2 [No Murmur] : no murmur heard [Pedal Pulses Present] : the pedal pulses are present [Soft] : abdomen soft [No Edema] : there was no peripheral edema [Non Tender] : non-tender [No Masses] : no abdominal mass palpated [Non-distended] : non-distended [Normal Bowel Sounds] : normal bowel sounds [Normal Sphincter Tone] : normal sphincter tone [Normal Posterior Cervical Nodes] : no posterior cervical lymphadenopathy [Normal Anterior Cervical Nodes] : no anterior cervical lymphadenopathy [No CVA Tenderness] : no CVA  tenderness [No Spinal Tenderness] : no spinal tenderness [No Joint Swelling] : no joint swelling [Grossly Normal Strength/Tone] : grossly normal strength/tone [No Focal Deficits] : no focal deficits [No Rash] : no rash [Normal Insight/Judgement] : insight and judgment were intact [Deep Tendon Reflexes (DTR)] : deep tendon reflexes were 2+ and symmetric [Normal Affect] : the affect was normal [de-identified] : + PND

## 2024-03-13 NOTE — HISTORY OF PRESENT ILLNESS
[de-identified] : Mr. JOSÉ MIGUEL YODER is a 68-year-old male with hx. of arthralgia of hand, arthritis, HTN, BPH, multiple sclerosis/KENTON on CPAP  and osteoarthritis, who presents for a follow up on chronic problems. He is following with pulmonologist for sleep apnea. He is not on any medication for multiple sclerosis.  He ambulates without assistance. Patient complaining persistent postnasal drip.  He denies any fever, chills, cough, sore throat. He denies any chest pain, shortness of breath, dizziness, abdominal pain.

## 2024-03-13 NOTE — REVIEW OF SYSTEMS
[Nasal Discharge] : nasal discharge [Earache] : no earache [Sore Throat] : no sore throat [Negative] : Heme/Lymph

## 2024-03-13 NOTE — PLAN
[FreeTextEntry1] :   Hypertension: Continue losartan 50 mg daily/metoprolol 25 mg daily Hyperlipidemia: Continue rosuvastatin 5 mg nightly MS: Not on medication Obstructive sleep apnea: Continue CPAP Postnasal drip: Flonase trial Recent blood work reviewed

## 2024-03-14 PROBLEM — M65.341 TRIGGER RING FINGER OF RIGHT HAND: Status: ACTIVE | Noted: 2023-03-29

## 2024-03-14 PROBLEM — M65.342 TRIGGER RING FINGER OF LEFT HAND: Status: ACTIVE | Noted: 2023-03-29

## 2024-03-14 PROBLEM — M65.331 TRIGGER MIDDLE FINGER OF RIGHT HAND: Status: ACTIVE | Noted: 2023-03-29

## 2024-03-21 ENCOUNTER — APPOINTMENT (OUTPATIENT)
Dept: ORTHOPEDIC SURGERY | Facility: CLINIC | Age: 69
End: 2024-03-21
Payer: MEDICARE

## 2024-03-21 DIAGNOSIS — M65.331 TRIGGER FINGER, RIGHT MIDDLE FINGER: ICD-10-CM

## 2024-03-21 DIAGNOSIS — M65.342 TRIGGER FINGER, LEFT RING FINGER: ICD-10-CM

## 2024-03-21 DIAGNOSIS — M65.341 TRIGGER FINGER, RIGHT RING FINGER: ICD-10-CM

## 2024-03-21 PROCEDURE — 99214 OFFICE O/P EST MOD 30 MIN: CPT

## 2024-03-21 NOTE — PHYSICAL EXAM
[de-identified] : - Constitutional: This is a male in no obvious distress.   - Psych: Patient is alert and oriented to person, place and time.  Patient has a normal mood and affect. - Cardiovascular: Normal pulses throughout the upper extremities.  No significant varicosities are noted in the upper extremities.  - Neuro: Strength and sensation are intact throughout the upper extremities.  Patient has normal coordination. - Respiratory:  Patient exhibits no evidence of shortness of breath or difficulty breathing. - Skin: No rashes, lesions, or other abnormalities are noted in the upper extremities.  ---   Examination of his left hand demonstrates mild swelling and tenderness along the A1 pulley of the middle finger with mild triggering.  There is no triggering of the other digits.  He is neurovascularly intact distally.

## 2024-03-21 NOTE — ADDENDUM
[FreeTextEntry1] :  I, Santy Elizabeth, acted solely as a scribe for Dr. Moctezuma on this date on 08/30/2023.

## 2024-03-21 NOTE — HISTORY OF PRESENT ILLNESS
[FreeTextEntry1] : Follow-up regarding bilateral middle and ring finger trigger fingers, most symptomatic at the left middle finger trigger finger.  See note from when he was seen in the office 5 months ago.  He was given a cortisone injection at the left middle finger trigger finger.  He returns today and rates his pain as a 5/10 and is worse in the morning.

## 2024-03-21 NOTE — PROCEDURE
[FreeTextEntry1] : -  After a discussion of risks and benefits, the patient agreed to proceed with a cortisone injection.   -  Side: Left -  Finger: middle finger trigger finger -  Medications: 0.5 cc of 1% Lidocaine and 1 cc of Celestone Soluspan, 6mg/cc, using sterile technique. -  Patient tolerated the procedure well, without complications. -  Patient was told that the symptoms may worsen for a day or two, and should then begin to improve.  -  Follow-iup: According to his symptoms

## 2024-03-21 NOTE — END OF VISIT
[FreeTextEntry3] :  I, Santy Elizabeth, acted solely as a scribe for Dr. Moctezuma on this date on 08/30/2023.s

## 2024-03-21 NOTE — DISCUSSION/SUMMARY
[FreeTextEntry1] : I had a discussion regarding today's visit, the diagnosis and treatment recommendations and options.  We also discussed changes since the last visit.  At this time, he agreed to proceed with a repeat cortisone injection.  The patient has agreed to the above plan of management and has expressed full understanding.  All questions were fully answered to the patient's satisfaction.  My cumulative time spent on today's visit  included: Preparation for the visit, review of the medical records, review of pertinent diagnostic studies, examination and counseling of the patient on the above diagnosis, treatment plan and prognosis, orders of diagnostic tests, medications and/or appropriate procedures and documentation in the medical records of today's visit.

## 2024-03-21 NOTE — HISTORY OF PRESENT ILLNESS
[FreeTextEntry1] : Follow-up regarding bilateral middle and ring finger trigger fingers, most symptomatic at the left middle finger trigger finger.  He was last seen in the office greater than 7 months ago and given a second cortisone injection at the left middle finger trigger finger.  He returns today, with persistent symptoms.  He no longer has symptoms in the other fingers.

## 2024-03-21 NOTE — PHYSICAL EXAM

## 2024-04-04 ENCOUNTER — OUTPATIENT (OUTPATIENT)
Dept: OUTPATIENT SERVICES | Facility: HOSPITAL | Age: 69
LOS: 1 days | End: 2024-04-04
Payer: MEDICARE

## 2024-04-04 VITALS
TEMPERATURE: 98 F | DIASTOLIC BLOOD PRESSURE: 69 MMHG | HEART RATE: 80 BPM | RESPIRATION RATE: 18 BRPM | OXYGEN SATURATION: 98 % | SYSTOLIC BLOOD PRESSURE: 110 MMHG | WEIGHT: 175.93 LBS | HEIGHT: 67 IN

## 2024-04-04 DIAGNOSIS — M65.332 TRIGGER FINGER, LEFT MIDDLE FINGER: ICD-10-CM

## 2024-04-04 DIAGNOSIS — Z98.890 OTHER SPECIFIED POSTPROCEDURAL STATES: Chronic | ICD-10-CM

## 2024-04-04 DIAGNOSIS — D35.02 BENIGN NEOPLASM OF LEFT ADRENAL GLAND: Chronic | ICD-10-CM

## 2024-04-04 DIAGNOSIS — E89.6 POSTPROCEDURAL ADRENOCORTICAL (-MEDULLARY) HYPOFUNCTION: Chronic | ICD-10-CM

## 2024-04-04 DIAGNOSIS — Z90.89 ACQUIRED ABSENCE OF OTHER ORGANS: Chronic | ICD-10-CM

## 2024-04-04 DIAGNOSIS — Z01.818 ENCOUNTER FOR OTHER PREPROCEDURAL EXAMINATION: ICD-10-CM

## 2024-04-04 DIAGNOSIS — M65.30 TRIGGER FINGER, UNSPECIFIED FINGER: ICD-10-CM

## 2024-04-04 LAB
ALBUMIN SERPL ELPH-MCNC: 3.8 G/DL — SIGNIFICANT CHANGE UP (ref 3.3–5)
ALP SERPL-CCNC: 69 U/L — SIGNIFICANT CHANGE UP (ref 30–120)
ALT FLD-CCNC: 28 U/L — SIGNIFICANT CHANGE UP (ref 10–60)
ANION GAP SERPL CALC-SCNC: 8 MMOL/L — SIGNIFICANT CHANGE UP (ref 5–17)
ANISOCYTOSIS BLD QL: SIGNIFICANT CHANGE UP
AST SERPL-CCNC: 11 U/L — SIGNIFICANT CHANGE UP (ref 10–40)
BASO STIPL BLD QL SMEAR: PRESENT — SIGNIFICANT CHANGE UP
BILIRUB SERPL-MCNC: 0.7 MG/DL — SIGNIFICANT CHANGE UP (ref 0.2–1.2)
BUN SERPL-MCNC: 25 MG/DL — HIGH (ref 7–23)
CALCIUM SERPL-MCNC: 9.8 MG/DL — SIGNIFICANT CHANGE UP (ref 8.4–10.5)
CHLORIDE SERPL-SCNC: 106 MMOL/L — SIGNIFICANT CHANGE UP (ref 96–108)
CO2 SERPL-SCNC: 28 MMOL/L — SIGNIFICANT CHANGE UP (ref 22–31)
CREAT SERPL-MCNC: 1 MG/DL — SIGNIFICANT CHANGE UP (ref 0.5–1.3)
EGFR: 81 ML/MIN/1.73M2 — SIGNIFICANT CHANGE UP
ELLIPTOCYTES BLD QL SMEAR: SLIGHT — SIGNIFICANT CHANGE UP
GLUCOSE SERPL-MCNC: 87 MG/DL — SIGNIFICANT CHANGE UP (ref 70–99)
HCT VFR BLD CALC: 41.2 % — SIGNIFICANT CHANGE UP (ref 39–50)
HGB BLD-MCNC: 12.6 G/DL — LOW (ref 13–17)
MACROCYTES BLD QL: SLIGHT — SIGNIFICANT CHANGE UP
MANUAL SMEAR VERIFICATION: SIGNIFICANT CHANGE UP
MCHC RBC-ENTMCNC: 19.1 PG — LOW (ref 27–34)
MCHC RBC-ENTMCNC: 30.6 GM/DL — LOW (ref 32–36)
MCV RBC AUTO: 62.3 FL — LOW (ref 80–100)
MICROCYTES BLD QL: SIGNIFICANT CHANGE UP
NRBC # BLD: 0 /100 WBCS — SIGNIFICANT CHANGE UP (ref 0–0)
OVALOCYTES BLD QL SMEAR: SLIGHT — SIGNIFICANT CHANGE UP
PLAT MORPH BLD: NORMAL — SIGNIFICANT CHANGE UP
PLATELET # BLD AUTO: 213 K/UL — SIGNIFICANT CHANGE UP (ref 150–400)
POIKILOCYTOSIS BLD QL AUTO: SIGNIFICANT CHANGE UP
POLYCHROMASIA BLD QL SMEAR: SLIGHT — SIGNIFICANT CHANGE UP
POTASSIUM SERPL-MCNC: 4.1 MMOL/L — SIGNIFICANT CHANGE UP (ref 3.5–5.3)
POTASSIUM SERPL-SCNC: 4.1 MMOL/L — SIGNIFICANT CHANGE UP (ref 3.5–5.3)
PROT SERPL-MCNC: 7.1 G/DL — SIGNIFICANT CHANGE UP (ref 6–8.3)
RBC # BLD: 6.61 M/UL — HIGH (ref 4.2–5.8)
RBC # FLD: 18.4 % — HIGH (ref 10.3–14.5)
RBC BLD AUTO: ABNORMAL
SODIUM SERPL-SCNC: 142 MMOL/L — SIGNIFICANT CHANGE UP (ref 135–145)
WBC # BLD: 6.81 K/UL — SIGNIFICANT CHANGE UP (ref 3.8–10.5)
WBC # FLD AUTO: 6.81 K/UL — SIGNIFICANT CHANGE UP (ref 3.8–10.5)

## 2024-04-04 PROCEDURE — 93010 ELECTROCARDIOGRAM REPORT: CPT

## 2024-04-04 PROCEDURE — 93005 ELECTROCARDIOGRAM TRACING: CPT

## 2024-04-04 PROCEDURE — G0463: CPT

## 2024-04-04 RX ORDER — LOSARTAN POTASSIUM 100 MG/1
1 TABLET, FILM COATED ORAL
Qty: 0 | Refills: 0 | DISCHARGE

## 2024-04-04 RX ORDER — HYDROCHLOROTHIAZIDE 25 MG
1 TABLET ORAL
Qty: 0 | Refills: 0 | DISCHARGE

## 2024-04-04 RX ORDER — MULTIVIT-MIN/FERROUS GLUCONATE 9 MG/15 ML
1 LIQUID (ML) ORAL
Qty: 0 | Refills: 0 | DISCHARGE

## 2024-04-04 RX ORDER — METOPROLOL TARTRATE 50 MG
1 TABLET ORAL
Qty: 0 | Refills: 0 | DISCHARGE

## 2024-04-04 RX ORDER — TAMSULOSIN HYDROCHLORIDE 0.4 MG/1
1 CAPSULE ORAL
Qty: 0 | Refills: 0 | DISCHARGE

## 2024-04-04 NOTE — H&P PST ADULT - SPO2 (%)
Instructions: This plan will send the code FBSD to the PM system.  DO NOT or CHANGE the price.
Price (Do Not Change): 0.00
Detail Level: Simple
98

## 2024-04-04 NOTE — H&P PST ADULT - NSICDXPASTMEDICALHX_GEN_ALL_CORE_FT
PAST MEDICAL HISTORY:  Benign neoplasm of left adrenal gland     BPH (benign prostatic hyperplasia) not requiring medication    Fatty liver     History of prediabetes     HLD (hyperlipidemia)     HTN (hypertension)     Mediterranean anemia     Multiple sclerosis dx 1986, not requiring medication, denies symptoms    KENTON (obstructive sleep apnea) uses a mouth appliance     PAST MEDICAL HISTORY:  Benign neoplasm of left adrenal gland     BPH (benign prostatic hyperplasia) not requiring medication    Fatty liver     History of prediabetes     HLD (hyperlipidemia)     HTN (hypertension)     Mediterranean anemia     Multiple sclerosis dx 1986, not requiring medication, denies symptoms    KENTON (obstructive sleep apnea) uses a mouth appliance    Trigger finger, left middle finger

## 2024-04-04 NOTE — H&P PST ADULT - PROBLEM SELECTOR PLAN 1
Left middle finger release scheduled on 04/25/2024  pre-op instructions provided. Patient verbalized understanding  Will obtain cardiology, medical clearance  h/o multiple sclerosis: not on medications  KENTON: Use CPAP

## 2024-04-04 NOTE — H&P PST ADULT - HISTORY OF PRESENT ILLNESS
67 y/o male present with left middle trigger finger. Patient complains 5/10 pain worse in the morning. He is scheduled for left middle finger trigger release.

## 2024-04-04 NOTE — H&P PST ADULT - NSICDXPASTSURGICALHX_GEN_ALL_CORE_FT
PAST SURGICAL HISTORY:  H/O colonoscopy 2021    H/O partial adrenalectomy     H/O shoulder surgery right    H/O uvulectomy     History of prostate surgery rezum procedure 2/2022

## 2024-04-13 PROBLEM — I25.10 CORONARY ARTERY CALCIFICATION: Status: ACTIVE | Noted: 2023-03-08

## 2024-04-13 PROBLEM — R94.31 ABNORMAL EKG: Status: ACTIVE | Noted: 2022-08-24

## 2024-04-15 ENCOUNTER — NON-APPOINTMENT (OUTPATIENT)
Age: 69
End: 2024-04-15

## 2024-04-15 ENCOUNTER — APPOINTMENT (OUTPATIENT)
Dept: CARDIOLOGY | Facility: CLINIC | Age: 69
End: 2024-04-15
Payer: MEDICARE

## 2024-04-15 VITALS
OXYGEN SATURATION: 97 % | TEMPERATURE: 97.2 F | HEIGHT: 67 IN | RESPIRATION RATE: 14 BRPM | HEART RATE: 107 BPM | DIASTOLIC BLOOD PRESSURE: 77 MMHG | SYSTOLIC BLOOD PRESSURE: 119 MMHG | WEIGHT: 177 LBS | BODY MASS INDEX: 27.78 KG/M2

## 2024-04-15 DIAGNOSIS — I25.10 ATHEROSCLEROTIC HEART DISEASE OF NATIVE CORONARY ARTERY W/OUT ANGINA PECTORIS: ICD-10-CM

## 2024-04-15 DIAGNOSIS — I49.3 VENTRICULAR PREMATURE DEPOLARIZATION: ICD-10-CM

## 2024-04-15 DIAGNOSIS — I25.84 ATHEROSCLEROTIC HEART DISEASE OF NATIVE CORONARY ARTERY W/OUT ANGINA PECTORIS: ICD-10-CM

## 2024-04-15 DIAGNOSIS — R94.31 ABNORMAL ELECTROCARDIOGRAM [ECG] [EKG]: ICD-10-CM

## 2024-04-15 PROCEDURE — 99214 OFFICE O/P EST MOD 30 MIN: CPT | Mod: 25

## 2024-04-15 PROCEDURE — 93000 ELECTROCARDIOGRAM COMPLETE: CPT

## 2024-04-15 NOTE — HISTORY OF PRESENT ILLNESS
[FreeTextEntry1] : Steven denies any chest pain, palpitations or SOB. Hand has been bothering him and now needs surgery. Walks daily for exercise regularly when weather permits.   Surgery: left third finger trigger release Date: 4/25/24 Surgeon: Dr. Abdon Moctezuma

## 2024-04-15 NOTE — DISCUSSION/SUMMARY
[FreeTextEntry1] : The patient is a 68-year-old gentleman MS, HTN, s/p adrenalectomy, CAD, PVC with trigger finger left hand  #1 CV- + Calcium, nonobstructive. #2 HTN- c/w losartan 50mg  #3 PVC- c/w toprol 25mg, event monitor no significant VT, still has PVCs but not of concern #4 HLD- c/w rosuvastatin 5mg, labs therapeutic #5 MS- not on medication secondary to cost, does walk with cane for precaution but can walk. #6 General- There are no cardiac contraindications to left hand surgery.  [EKG obtained to assist in diagnosis and management of assessed problem(s)] : EKG obtained to assist in diagnosis and management of assessed problem(s)

## 2024-04-16 ENCOUNTER — APPOINTMENT (OUTPATIENT)
Dept: INTERNAL MEDICINE | Facility: CLINIC | Age: 69
End: 2024-04-16
Payer: MEDICARE

## 2024-04-16 VITALS
TEMPERATURE: 97.5 F | OXYGEN SATURATION: 97 % | DIASTOLIC BLOOD PRESSURE: 75 MMHG | BODY MASS INDEX: 27.72 KG/M2 | WEIGHT: 177 LBS | SYSTOLIC BLOOD PRESSURE: 120 MMHG | HEART RATE: 87 BPM | RESPIRATION RATE: 14 BRPM

## 2024-04-16 DIAGNOSIS — E78.00 PURE HYPERCHOLESTEROLEMIA, UNSPECIFIED: ICD-10-CM

## 2024-04-16 DIAGNOSIS — Z01.818 ENCOUNTER FOR OTHER PREPROCEDURAL EXAMINATION: ICD-10-CM

## 2024-04-16 DIAGNOSIS — G47.33 OBSTRUCTIVE SLEEP APNEA (ADULT) (PEDIATRIC): ICD-10-CM

## 2024-04-16 DIAGNOSIS — I10 ESSENTIAL (PRIMARY) HYPERTENSION: ICD-10-CM

## 2024-04-16 PROCEDURE — 99214 OFFICE O/P EST MOD 30 MIN: CPT

## 2024-04-16 RX ORDER — NAPROXEN 500 MG/1
500 TABLET ORAL
Qty: 30 | Refills: 0 | Status: DISCONTINUED | COMMUNITY
Start: 2023-10-04 | End: 2024-04-16

## 2024-04-16 RX ORDER — MOMETASONE 50 UG/1
50 SPRAY, METERED NASAL
Qty: 1 | Refills: 0 | Status: DISCONTINUED | COMMUNITY
Start: 2024-03-13 | End: 2024-04-16

## 2024-04-16 NOTE — PLAN
[FreeTextEntry1] : See plan  Pt cleared with Cardiology for surgery  Hypertension Continue losartan 50 mg daily cont metoprolol 25 mg daily  Hyperlipidemia Continue rosuvastatin 5 mg nightly  MS Not on medication  Obstructive sleep apnea Continue CPAP

## 2024-04-16 NOTE — HISTORY OF PRESENT ILLNESS
[FreeTextEntry1] : left hand middle finger triger finger release [FreeTextEntry2] : 4/25/24 [FreeTextEntry3] : Dr. Moctezuma [FreeTextEntry4] : Mr. JOSÉ MIGUEL YODER is a 68-year-old male with hx. of arthralgia of hand, arthritis, HTN, BPH, multiple sclerosis/KENTON on CPAP, thalasemmia trait, and osteoarthritis, who presents for pre-op clearance.  Pt reports that he plans to have trigger finger release on his Lt hand middle finger with Dr. Moctezuma on May 25th, 2024. He has previously had two cortisone shots without much relief in hi sxs. He continues to feel pain at present. Has already been cleared by cardiology, Dr. Jiménez, yesterday and had an EKG.  Denies any exertional chest pain, dyspnea, palpitations, headaches, and dizziness. Denies any F/C, N/V, leg swelling, or abdominal pain. He is otherwise fine and offers no additional complaints.

## 2024-04-16 NOTE — PHYSICAL EXAM
[No Acute Distress] : no acute distress [Well Nourished] : well nourished [Well Developed] : well developed [Well-Appearing] : well-appearing [Normal Sclera/Conjunctiva] : normal sclera/conjunctiva [Normal Outer Ear/Nose] : the outer ears and nose were normal in appearance [Normal Oropharynx] : the oropharynx was normal [No JVD] : no jugular venous distention [No Lymphadenopathy] : no lymphadenopathy [Supple] : supple [No Respiratory Distress] : no respiratory distress  [No Accessory Muscle Use] : no accessory muscle use [Clear to Auscultation] : lungs were clear to auscultation bilaterally [Normal Rate] : normal rate  [Regular Rhythm] : with a regular rhythm [Normal S1, S2] : normal S1 and S2 [No Murmur] : no murmur heard [Pedal Pulses Present] : the pedal pulses are present [No Edema] : there was no peripheral edema [Soft] : abdomen soft [Non Tender] : non-tender [Non-distended] : non-distended [No Masses] : no abdominal mass palpated [Normal Bowel Sounds] : normal bowel sounds [Normal Sphincter Tone] : normal sphincter tone [Normal Posterior Cervical Nodes] : no posterior cervical lymphadenopathy [Normal Anterior Cervical Nodes] : no anterior cervical lymphadenopathy [No CVA Tenderness] : no CVA  tenderness [No Spinal Tenderness] : no spinal tenderness [No Joint Swelling] : no joint swelling [Grossly Normal Strength/Tone] : grossly normal strength/tone [No Rash] : no rash [No Focal Deficits] : no focal deficits [Deep Tendon Reflexes (DTR)] : deep tendon reflexes were 2+ and symmetric [Normal Affect] : the affect was normal [Normal Insight/Judgement] : insight and judgment were intact

## 2024-04-16 NOTE — ADDENDUM
[FreeTextEntry1] :   Documented by Nathan Prado acting as a scribe for Dr. Kandi Collier. 04/16/2024   All medical record entries made by the Scribe were at my, Dr. Kandi Collier, direction and personally dictated by me on 04/16/2024. I have reviewed the chart and agree that the record accurately reflects my personal performance of the history, physical exam, assessment and plan. I have also personally directed, reviewed, and agreed with the chart.

## 2024-04-18 PROBLEM — I10 HYPERTENSION: Status: ACTIVE | Noted: 2020-05-13

## 2024-04-18 PROBLEM — E78.00 HYPERCHOLESTEREMIA: Status: ACTIVE | Noted: 2020-11-25

## 2024-04-18 PROBLEM — Z01.818 PREOP EXAMINATION: Status: ACTIVE | Noted: 2024-04-18

## 2024-04-18 PROBLEM — G47.33 OSA ON CPAP: Status: ACTIVE | Noted: 2023-10-30

## 2024-04-19 ENCOUNTER — NON-APPOINTMENT (OUTPATIENT)
Age: 69
End: 2024-04-19

## 2024-04-24 ENCOUNTER — TRANSCRIPTION ENCOUNTER (OUTPATIENT)
Age: 69
End: 2024-04-24

## 2024-04-25 ENCOUNTER — TRANSCRIPTION ENCOUNTER (OUTPATIENT)
Age: 69
End: 2024-04-25

## 2024-04-25 ENCOUNTER — APPOINTMENT (OUTPATIENT)
Dept: ORTHOPEDIC SURGERY | Facility: HOSPITAL | Age: 69
End: 2024-04-25

## 2024-04-25 ENCOUNTER — OUTPATIENT (OUTPATIENT)
Dept: OUTPATIENT SERVICES | Facility: HOSPITAL | Age: 69
LOS: 1 days | End: 2024-04-25
Payer: MEDICARE

## 2024-04-25 VITALS
OXYGEN SATURATION: 99 % | TEMPERATURE: 97 F | SYSTOLIC BLOOD PRESSURE: 126 MMHG | HEART RATE: 74 BPM | RESPIRATION RATE: 16 BRPM | DIASTOLIC BLOOD PRESSURE: 72 MMHG

## 2024-04-25 VITALS
HEART RATE: 92 BPM | SYSTOLIC BLOOD PRESSURE: 128 MMHG | OXYGEN SATURATION: 97 % | TEMPERATURE: 98 F | DIASTOLIC BLOOD PRESSURE: 79 MMHG | WEIGHT: 173.94 LBS | RESPIRATION RATE: 16 BRPM | HEIGHT: 67 IN

## 2024-04-25 DIAGNOSIS — Z98.890 OTHER SPECIFIED POSTPROCEDURAL STATES: Chronic | ICD-10-CM

## 2024-04-25 DIAGNOSIS — M65.332 TRIGGER FINGER, LEFT MIDDLE FINGER: ICD-10-CM

## 2024-04-25 DIAGNOSIS — Z90.89 ACQUIRED ABSENCE OF OTHER ORGANS: Chronic | ICD-10-CM

## 2024-04-25 DIAGNOSIS — E89.6 POSTPROCEDURAL ADRENOCORTICAL (-MEDULLARY) HYPOFUNCTION: Chronic | ICD-10-CM

## 2024-04-25 PROCEDURE — 26055 INCISE FINGER TENDON SHEATH: CPT | Mod: F2

## 2024-04-25 RX ORDER — ERGOCALCIFEROL 1.25 MG/1
1 CAPSULE ORAL
Qty: 0 | Refills: 0 | DISCHARGE

## 2024-04-25 RX ORDER — OXYCODONE HYDROCHLORIDE 5 MG/1
5 TABLET ORAL ONCE
Refills: 0 | Status: DISCONTINUED | OUTPATIENT
Start: 2024-04-25 | End: 2024-04-25

## 2024-04-25 RX ORDER — HYDROMORPHONE HYDROCHLORIDE 2 MG/ML
1 INJECTION INTRAMUSCULAR; INTRAVENOUS; SUBCUTANEOUS
Refills: 0 | Status: DISCONTINUED | OUTPATIENT
Start: 2024-04-25 | End: 2024-04-25

## 2024-04-25 RX ORDER — CHLORHEXIDINE GLUCONATE 213 G/1000ML
1 SOLUTION TOPICAL ONCE
Refills: 0 | Status: COMPLETED | OUTPATIENT
Start: 2024-04-25 | End: 2024-04-25

## 2024-04-25 RX ORDER — IBUPROFEN 200 MG
1 TABLET ORAL
Qty: 30 | Refills: 0
Start: 2024-04-25

## 2024-04-25 RX ORDER — PREGABALIN 225 MG/1
1 CAPSULE ORAL
Qty: 0 | Refills: 0 | DISCHARGE

## 2024-04-25 RX ORDER — LOSARTAN POTASSIUM 100 MG/1
1 TABLET, FILM COATED ORAL
Refills: 0 | DISCHARGE

## 2024-04-25 RX ORDER — APREPITANT 80 MG/1
40 CAPSULE ORAL ONCE
Refills: 0 | Status: COMPLETED | OUTPATIENT
Start: 2024-04-25 | End: 2024-04-25

## 2024-04-25 RX ORDER — METOPROLOL TARTRATE 50 MG
1 TABLET ORAL
Refills: 0 | DISCHARGE

## 2024-04-25 RX ORDER — MULTIVIT-MIN/FERROUS GLUCONATE 9 MG/15 ML
1 LIQUID (ML) ORAL
Qty: 0 | Refills: 0 | DISCHARGE

## 2024-04-25 RX ORDER — ONDANSETRON 8 MG/1
4 TABLET, FILM COATED ORAL ONCE
Refills: 0 | Status: DISCONTINUED | OUTPATIENT
Start: 2024-04-25 | End: 2024-05-09

## 2024-04-25 RX ORDER — HYDROMORPHONE HYDROCHLORIDE 2 MG/ML
0.5 INJECTION INTRAMUSCULAR; INTRAVENOUS; SUBCUTANEOUS
Refills: 0 | Status: DISCONTINUED | OUTPATIENT
Start: 2024-04-25 | End: 2024-04-25

## 2024-04-25 RX ORDER — ROSUVASTATIN CALCIUM 5 MG/1
1 TABLET ORAL
Refills: 0 | DISCHARGE

## 2024-04-25 RX ORDER — ASCORBIC ACID 60 MG
1 TABLET,CHEWABLE ORAL
Qty: 0 | Refills: 0 | DISCHARGE

## 2024-04-25 RX ORDER — SODIUM CHLORIDE 9 MG/ML
1000 INJECTION, SOLUTION INTRAVENOUS
Refills: 0 | Status: DISCONTINUED | OUTPATIENT
Start: 2024-04-25 | End: 2024-05-09

## 2024-04-25 RX ORDER — CEFAZOLIN SODIUM 1 G
2000 VIAL (EA) INJECTION ONCE
Refills: 0 | Status: COMPLETED | OUTPATIENT
Start: 2024-04-25 | End: 2024-04-25

## 2024-04-25 RX ADMIN — CHLORHEXIDINE GLUCONATE 1 APPLICATION(S): 213 SOLUTION TOPICAL at 06:43

## 2024-04-25 RX ADMIN — SODIUM CHLORIDE 75 MILLILITER(S): 9 INJECTION, SOLUTION INTRAVENOUS at 08:43

## 2024-04-25 RX ADMIN — APREPITANT 40 MILLIGRAM(S): 80 CAPSULE ORAL at 06:43

## 2024-04-25 NOTE — ASU PREOP CHECKLIST - BLOOD AVAILABLE
What Type Of Note Output Would You Prefer (Optional)?: Bullet Format
Is This A New Presentation, Or A Follow-Up?: Skin Lesion
Additional History: Previously diagnosed SK pt would like re-examined for potential tx
no

## 2024-04-25 NOTE — ASU PATIENT PROFILE, ADULT - NSICDXPASTMEDICALHX_GEN_ALL_CORE_FT
PAST MEDICAL HISTORY:  Benign neoplasm of left adrenal gland     BPH (benign prostatic hyperplasia) not requiring medication    Fatty liver     History of prediabetes     HLD (hyperlipidemia)     HTN (hypertension)     Mediterranean anemia     Multiple sclerosis dx 1986, not requiring medication, denies symptoms    KENTON (obstructive sleep apnea) uses a mouth appliance    Trigger finger, left middle finger

## 2024-04-25 NOTE — ASU PATIENT PROFILE, ADULT - FALL HARM RISK - UNIVERSAL INTERVENTIONS
Bed in lowest position, wheels locked, appropriate side rails in place/Call bell, personal items and telephone in reach/Instruct patient to call for assistance before getting out of bed or chair/Non-slip footwear when patient is out of bed/[place] to call system/Physically safe environment - no spills, clutter or unnecessary equipment/Purposeful Proactive Rounding/Room/bathroom lighting operational, light cord in reach

## 2024-04-25 NOTE — BRIEF OPERATIVE NOTE - NSICDXBRIEFPOSTOP_GEN_ALL_CORE_FT
POST-OP DIAGNOSIS:  Acquired trigger finger of left ring finger 25-Apr-2024 08:04:48  Kasey Mckeon

## 2024-04-25 NOTE — ASU DISCHARGE PLAN (ADULT/PEDIATRIC) - CARE PROVIDER_API CALL
Abdon Moctezuma)  Surgery of the Hand  [address], [address]  [place] [address]  Phone: [phone]  Fax: [phone]  Scheduled Appointment: 05/03/2024

## 2024-04-25 NOTE — ASU DISCHARGE PLAN (ADULT/PEDIATRIC) - NS MD DC FALL RISK RISK
For information on Fall & Injury Prevention, visit: [email or web address] OR  [email or web address] OR  https://www.cdc.gov/steadi/patient.html

## 2024-04-26 PROBLEM — M65.332 TRIGGER FINGER, LEFT MIDDLE FINGER: Chronic | Status: ACTIVE | Noted: 2024-04-04

## 2024-05-02 ENCOUNTER — NON-APPOINTMENT (OUTPATIENT)
Age: 69
End: 2024-05-02

## 2024-05-03 ENCOUNTER — APPOINTMENT (OUTPATIENT)
Dept: ORTHOPEDIC SURGERY | Facility: CLINIC | Age: 69
End: 2024-05-03
Payer: MEDICARE

## 2024-05-03 PROCEDURE — 99024 POSTOP FOLLOW-UP VISIT: CPT

## 2024-05-03 NOTE — ADDENDUM
[FreeTextEntry1] :  I, Santy Elizabeth, acted solely as a scribe for Dr. Moctezuma on this date on 05/03/2024.

## 2024-05-03 NOTE — HISTORY OF PRESENT ILLNESS
[de-identified] : 8 days postoperative. [de-identified] : 8 days status post left middle finger trigger release.  Date of surgery: 4/25/2024.  He is overall doing well today. He denies any numbness but has some swelling.   [de-identified] : Examination of his left middle finger demonstrates his incision to be clean and dry. There is no evidence of an infection. He has full flexion without triggering. He has some loss of terminal extension secondary to arthritis. He is neurovascularly intact distally.  [de-identified] : Stable, 8 days postoperative. [de-identified] : The sutures were removed and Steri-Strips were applied.  He was instructed on local wound care, when to begin scar massage and desensitization, range of motion exercises.  At his request, he was provided with the appropriate referral to hand therapy if needed. He will followup in 4 weeks.  The patient was instructed to return before then or to call the office if there are any problems in the interim.

## 2024-05-03 NOTE — END OF VISIT
[FreeTextEntry3] : This note was written by Santy Elizabeth on 05/03/2024 acting solely as a scribe for Dr. Abdon Moctezuma.   All medical record entries made by the Scribe were at my, Dr. Abdon Moctezuma, direction and personally dictated by me on 05/03/2024. I have personally reviewed the chart and agree that the record accurately reflects my personal performance of the history, physical exam, assessment and plan.

## 2024-05-17 RX ORDER — ROSUVASTATIN CALCIUM 5 MG/1
5 TABLET, FILM COATED ORAL
Qty: 90 | Refills: 1 | Status: ACTIVE | COMMUNITY
Start: 2023-01-05 | End: 1900-01-01

## 2024-05-22 RX ORDER — METOPROLOL SUCCINATE 25 MG/1
25 TABLET, EXTENDED RELEASE ORAL
Qty: 90 | Refills: 0 | Status: ACTIVE | COMMUNITY
Start: 1900-01-01 | End: 1900-01-01

## 2024-05-23 ENCOUNTER — NON-APPOINTMENT (OUTPATIENT)
Age: 69
End: 2024-05-23

## 2024-05-29 PROBLEM — M65.332 TRIGGER MIDDLE FINGER OF LEFT HAND: Status: ACTIVE | Noted: 2023-03-29

## 2024-06-05 ENCOUNTER — APPOINTMENT (OUTPATIENT)
Dept: ORTHOPEDIC SURGERY | Facility: CLINIC | Age: 69
End: 2024-06-05
Payer: MEDICARE

## 2024-06-05 DIAGNOSIS — M65.332 TRIGGER FINGER, LEFT MIDDLE FINGER: ICD-10-CM

## 2024-06-05 PROCEDURE — 99024 POSTOP FOLLOW-UP VISIT: CPT

## 2024-06-05 NOTE — HISTORY OF PRESENT ILLNESS
[de-identified] : 41 days postoperative. [de-identified] : 41 days status post left middle finger trigger release.  Date of surgery: 4/25/2024.  He is overall doing well today.  [de-identified] : Examination of his left middle finger demonstrates his incision to be healing well.  There is decreased swelling.  He has full flexion without triggering. He has some loss of terminal extension at the DIP joint secondary to arthritis. He is neurovascularly intact distally.  [de-identified] : Stable, and doing well, 41 days postoperative. [de-identified] : He was instructed on continue range of motion exercises and scar massage and desensitization.  He will follow-up on an as-needed basis.

## 2024-06-25 NOTE — H&P PST ADULT - PROBLEM SELECTOR PROBLEM 3
Mom now calling to day. She sent this yesterday. Was routed to [facility]. Will send to aubrie Barrera and jeremías today. Please update mom on status of referral to [facility] , Dr Patel ( [facility]??)  The issue is the fax. Looks like it needs to be sent to the new fax #   Multiple sclerosis

## 2024-06-26 ENCOUNTER — APPOINTMENT (OUTPATIENT)
Dept: PULMONOLOGY | Facility: CLINIC | Age: 69
End: 2024-06-26

## 2024-06-26 VITALS
HEIGHT: 67 IN | WEIGHT: 180 LBS | DIASTOLIC BLOOD PRESSURE: 80 MMHG | OXYGEN SATURATION: 97 % | BODY MASS INDEX: 28.25 KG/M2 | HEART RATE: 79 BPM | RESPIRATION RATE: 14 BRPM | SYSTOLIC BLOOD PRESSURE: 126 MMHG

## 2024-06-26 DIAGNOSIS — Z87.891 PERSONAL HISTORY OF NICOTINE DEPENDENCE: ICD-10-CM

## 2024-06-26 DIAGNOSIS — G47.33 OBSTRUCTIVE SLEEP APNEA (ADULT) (PEDIATRIC): ICD-10-CM

## 2024-06-26 PROCEDURE — G2211 COMPLEX E/M VISIT ADD ON: CPT

## 2024-06-26 PROCEDURE — 99213 OFFICE O/P EST LOW 20 MIN: CPT

## 2024-07-12 ENCOUNTER — APPOINTMENT (OUTPATIENT)
Dept: INTERNAL MEDICINE | Facility: CLINIC | Age: 69
End: 2024-07-12
Payer: MEDICARE

## 2024-07-12 VITALS
WEIGHT: 178 LBS | SYSTOLIC BLOOD PRESSURE: 121 MMHG | OXYGEN SATURATION: 97 % | RESPIRATION RATE: 16 BRPM | HEART RATE: 78 BPM | HEIGHT: 67 IN | BODY MASS INDEX: 27.94 KG/M2 | DIASTOLIC BLOOD PRESSURE: 71 MMHG

## 2024-07-12 DIAGNOSIS — I10 ESSENTIAL (PRIMARY) HYPERTENSION: ICD-10-CM

## 2024-07-12 DIAGNOSIS — Z12.12 ENCOUNTER FOR SCREENING FOR MALIGNANT NEOPLASM OF COLON: ICD-10-CM

## 2024-07-12 DIAGNOSIS — Z00.00 ENCOUNTER FOR GENERAL ADULT MEDICAL EXAMINATION W/OUT ABNORMAL FINDINGS: ICD-10-CM

## 2024-07-12 DIAGNOSIS — E78.00 PURE HYPERCHOLESTEROLEMIA, UNSPECIFIED: ICD-10-CM

## 2024-07-12 DIAGNOSIS — Z12.11 ENCOUNTER FOR SCREENING FOR MALIGNANT NEOPLASM OF COLON: ICD-10-CM

## 2024-07-12 PROCEDURE — G0444 DEPRESSION SCREEN ANNUAL: CPT | Mod: 59

## 2024-07-12 PROCEDURE — 99397 PER PM REEVAL EST PAT 65+ YR: CPT

## 2024-07-15 ENCOUNTER — LABORATORY RESULT (OUTPATIENT)
Age: 69
End: 2024-07-15

## 2024-07-23 LAB
25(OH)D3 SERPL-MCNC: 29.8 NG/ML
ALBUMIN SERPL ELPH-MCNC: 4.6 G/DL
ALP BLD-CCNC: 75 U/L
ALT SERPL-CCNC: 31 U/L
ANION GAP SERPL CALC-SCNC: 12 MMOL/L
APPEARANCE: CLEAR
AST SERPL-CCNC: 16 U/L
BASOPHILS # BLD AUTO: 0.07 K/UL
BASOPHILS NFR BLD AUTO: 1.1 %
BILIRUB SERPL-MCNC: 0.9 MG/DL
BILIRUBIN URINE: NEGATIVE
BLOOD URINE: NEGATIVE
BUN SERPL-MCNC: 17 MG/DL
CALCIUM SERPL-MCNC: 9.4 MG/DL
CHLORIDE SERPL-SCNC: 102 MMOL/L
CHOLEST SERPL-MCNC: 127 MG/DL
CO2 SERPL-SCNC: 25 MMOL/L
COLOR: YELLOW
CREAT SERPL-MCNC: 0.99 MG/DL
EGFR: 82 ML/MIN/1.73M2
EOSINOPHIL NFR BLD AUTO: 2.2 %
ESTIMATED AVERAGE GLUCOSE: 131 MG/DL
GLUCOSE QUALITATIVE U: NEGATIVE MG/DL
GLUCOSE SERPL-MCNC: 89 MG/DL
HBA1C MFR BLD HPLC: 6.2 %
HGB BLD-MCNC: 12.2 G/DL
IMM GRANULOCYTES NFR BLD AUTO: 0.2 %
KETONES URINE: NEGATIVE MG/DL
LYMPHOCYTES # BLD AUTO: 1.75 K/UL
LYMPHOCYTES NFR BLD AUTO: 27.4 %
MAN DIFF?: NORMAL
MCHC RBC-ENTMCNC: 19 PG
MCHC RBC-ENTMCNC: 29.9 GM/DL
MCV RBC AUTO: 63.7 FL
MONOCYTES NFR BLD AUTO: 9.9 %
NEUTROPHILS NFR BLD AUTO: 59.2 %
NITRITE URINE: NEGATIVE
NONHDLC SERPL-MCNC: 83 MG/DL
PH URINE: 6
PLATELET # BLD AUTO: 188 K/UL
POTASSIUM SERPL-SCNC: 4.3 MMOL/L
PROTEIN URINE: NEGATIVE MG/DL
RBC # BLD: 6.41 M/UL
RBC # FLD: 19.2 %
SODIUM SERPL-SCNC: 138 MMOL/L
UROBILINOGEN URINE: 0.2 MG/DL
VIT B12 SERPL-MCNC: 926 PG/ML
WBC # FLD AUTO: 6.39 K/UL

## 2024-07-24 ENCOUNTER — TRANSCRIPTION ENCOUNTER (OUTPATIENT)
Age: 69
End: 2024-07-24

## 2024-08-07 ENCOUNTER — NON-APPOINTMENT (OUTPATIENT)
Age: 69
End: 2024-08-07

## 2024-08-07 ENCOUNTER — APPOINTMENT (OUTPATIENT)
Dept: CARDIOLOGY | Facility: CLINIC | Age: 69
End: 2024-08-07

## 2024-08-07 PROCEDURE — 99214 OFFICE O/P EST MOD 30 MIN: CPT

## 2024-08-07 PROCEDURE — 93000 ELECTROCARDIOGRAM COMPLETE: CPT

## 2024-08-07 PROCEDURE — G2211 COMPLEX E/M VISIT ADD ON: CPT

## 2024-08-07 NOTE — DISCUSSION/SUMMARY
[FreeTextEntry1] : The patient is a 68-year-old gentleman MS, HTN, s/p adrenalectomy, CAD, PVC with elevated glucose. #1 CV- + Calcium, nonobstructive. #2 HTN- c/w losartan 50mg  #3 PVC- c/w toprol 25mg, event monitor no significant VT #4 HLD- c/w rosuvastatin 5mg, labs therapeutic #5 MS- not on medication secondary to cost, does walk with cane for precaution but can walk. #6 preDM- made dietary changes already #7 General- s/p left hand surgery. Increase exercise with walking or stationary bicycle.  [EKG obtained to assist in diagnosis and management of assessed problem(s)] : EKG obtained to assist in diagnosis and management of assessed problem(s)

## 2024-08-07 NOTE — HISTORY OF PRESENT ILLNESS
[FreeTextEntry1] : Steven has been growing tomatoes and tried garlic but did not come out right. Walks for exercise but not in hot weather. Now has bicycle in basement and does about one mile. No sx. Knows sugar high from cherries, ice cream and watermelon.

## 2024-09-18 ENCOUNTER — APPOINTMENT (OUTPATIENT)
Dept: INTERNAL MEDICINE | Facility: CLINIC | Age: 69
End: 2024-09-18
Payer: MEDICARE

## 2024-09-18 VITALS
SYSTOLIC BLOOD PRESSURE: 119 MMHG | RESPIRATION RATE: 16 BRPM | TEMPERATURE: 97.3 F | HEIGHT: 67 IN | OXYGEN SATURATION: 98 % | DIASTOLIC BLOOD PRESSURE: 74 MMHG | HEART RATE: 77 BPM | WEIGHT: 179 LBS | BODY MASS INDEX: 28.09 KG/M2

## 2024-09-18 DIAGNOSIS — G35 MULTIPLE SCLEROSIS: ICD-10-CM

## 2024-09-18 DIAGNOSIS — I10 ESSENTIAL (PRIMARY) HYPERTENSION: ICD-10-CM

## 2024-09-18 DIAGNOSIS — R73.09 OTHER ABNORMAL GLUCOSE: ICD-10-CM

## 2024-09-18 DIAGNOSIS — E78.2 MIXED HYPERLIPIDEMIA: ICD-10-CM

## 2024-09-18 PROCEDURE — 90715 TDAP VACCINE 7 YRS/> IM: CPT

## 2024-09-18 PROCEDURE — 90471 IMMUNIZATION ADMIN: CPT

## 2024-09-18 PROCEDURE — 99214 OFFICE O/P EST MOD 30 MIN: CPT | Mod: 25

## 2024-09-18 NOTE — HISTORY OF PRESENT ILLNESS
[de-identified] : Mr. JOSÉ MIGUEL YODER is a 69-year-old male with hx. of arthralgia of hand, arthritis, HTN, BPH, multiple sclerosis/[place] on CPAP, thalasemmia trait, and osteoarthritis, presenting for follow up on chronic problems.  Pt. states that he is doing well and feeling fine. He is compliant with his medications. Pt grandchild recently born, he wants to get vaccines for health mainetenance. Denies any abdominal pain, N/V ,C/D. Denies CP, SOB, PEARSON, dizziness. He is otherwise well and offers no additional complaints.

## 2024-09-18 NOTE — REVIEW OF SYSTEMS
[Negative] : Heme/Lymph [Fatigue] : no fatigue [Chest Pain] : no chest pain [Abdominal Pain] : no abdominal pain [Nausea] : no nausea [Constipation] : no constipation [Vomiting] : no vomiting [Dizziness] : no dizziness

## 2024-09-18 NOTE — PHYSICAL EXAM
[No Acute Distress] : no acute distress [Well Nourished] : well nourished [Well Developed] : well developed [Well-Appearing] : well-appearing [Normal Sclera/Conjunctiva] : normal sclera/conjunctiva [PERRL] : pupils equal round and reactive to light [EOMI] : extraocular movements intact [Normal Outer Ear/Nose] : the outer ears and nose were normal in appearance [Normal Oropharynx] : the oropharynx was normal [No JVD] : no jugular venous distention [No Lymphadenopathy] : no lymphadenopathy [Supple] : supple [Thyroid Normal, No Nodules] : the thyroid was normal and there were no nodules present [No Respiratory Distress] : no respiratory distress  [No Accessory Muscle Use] : no accessory muscle use [Clear to Auscultation] : lungs were clear to auscultation bilaterally [Normal Rate] : normal rate  [Regular Rhythm] : with a regular rhythm [Normal S1, S2] : normal S1 and S2 [No Murmur] : no murmur heard [No Carotid Bruits] : no carotid bruits [Pedal Pulses Present] : the pedal pulses are present [No Edema] : there was no peripheral edema [Soft] : abdomen soft [Non Tender] : non-tender [Non-distended] : non-distended [No Masses] : no abdominal mass palpated [Normal Bowel Sounds] : normal bowel sounds [Normal Posterior Cervical Nodes] : no posterior cervical lymphadenopathy [Normal Anterior Cervical Nodes] : no anterior cervical lymphadenopathy [No CVA Tenderness] : no CVA  tenderness [No Spinal Tenderness] : no spinal tenderness [No Joint Swelling] : no joint swelling [Grossly Normal Strength/Tone] : grossly normal strength/tone [No Rash] : no rash [No Focal Deficits] : no focal deficits [Normal Gait] : normal gait [Normal Affect] : the affect was normal [Normal Insight/Judgement] : insight and judgment were intact

## 2024-09-18 NOTE — PLAN
[FreeTextEntry1] : see plan.   HTN low sodium/low fat/ low caffeine diet & exercise discussed w/pt. Continue losartan 50 mg QD cont metoprolol 25 mg QD  Hypercholesterolemia Needs low fat/ low cholesterol diet / exercise / repeat fasting lipids Continue rosuvastatin 5 mg nightly  MS Not on medication  Elevated Hb A1C need low carb diet/ exercise  TDAP vaccine administered. Bloodwork ordered. Pt instructed to follow up in 1 month.

## 2024-09-25 ENCOUNTER — APPOINTMENT (OUTPATIENT)
Dept: PULMONOLOGY | Facility: CLINIC | Age: 69
End: 2024-09-25
Payer: MEDICARE

## 2024-09-25 VITALS
DIASTOLIC BLOOD PRESSURE: 78 MMHG | HEART RATE: 81 BPM | TEMPERATURE: 97.3 F | BODY MASS INDEX: 28.09 KG/M2 | SYSTOLIC BLOOD PRESSURE: 112 MMHG | OXYGEN SATURATION: 97 % | HEIGHT: 67 IN | WEIGHT: 179 LBS | RESPIRATION RATE: 16 BRPM

## 2024-09-25 DIAGNOSIS — G47.33 OBSTRUCTIVE SLEEP APNEA (ADULT) (PEDIATRIC): ICD-10-CM

## 2024-09-25 PROCEDURE — 99212 OFFICE O/P EST SF 10 MIN: CPT

## 2024-09-25 NOTE — ASSESSMENT
[FreeTextEntry1] :  Mr. JOSÉ MIGUEL YODER is a 68 year old male with past medical history significant for Arthritis, BPH, CAD, HLD, HTN, [place] on CPAP. Reports being in baseline state of health. wakes up at least 2x nightly, uses machine nightly nasal pillows. About 7 hrs. I reviewed data from his Do, AHI <5 with CPAP. On exam, lungs are clear, no wheezing or rhonchi, in NAD. Care plans discussed - will continue nightly CPAP. Follow-up in 3 months.

## 2024-09-25 NOTE — HISTORY OF PRESENT ILLNESS
[TextBox_4] : Mr. JOSÉ MIGUEL YODER is a 68 year old male with past medical history significant for Arthritis, BPH, CAD, HLD, HTN, [place] on CPAP, PVCs

## 2024-10-11 ENCOUNTER — APPOINTMENT (OUTPATIENT)
Dept: GASTROENTEROLOGY | Facility: CLINIC | Age: 69
End: 2024-10-11

## 2024-10-18 ENCOUNTER — APPOINTMENT (OUTPATIENT)
Dept: INTERNAL MEDICINE | Facility: CLINIC | Age: 69
End: 2024-10-18
Payer: MEDICARE

## 2024-10-18 VITALS
WEIGHT: 178 LBS | BODY MASS INDEX: 27.94 KG/M2 | SYSTOLIC BLOOD PRESSURE: 109 MMHG | HEART RATE: 75 BPM | RESPIRATION RATE: 16 BRPM | TEMPERATURE: 97.1 F | HEIGHT: 67 IN | OXYGEN SATURATION: 99 % | DIASTOLIC BLOOD PRESSURE: 74 MMHG

## 2024-10-18 DIAGNOSIS — I10 ESSENTIAL (PRIMARY) HYPERTENSION: ICD-10-CM

## 2024-10-18 DIAGNOSIS — R73.09 OTHER ABNORMAL GLUCOSE: ICD-10-CM

## 2024-10-18 DIAGNOSIS — R79.89 OTHER SPECIFIED ABNORMAL FINDINGS OF BLOOD CHEMISTRY: ICD-10-CM

## 2024-10-18 DIAGNOSIS — G35 MULTIPLE SCLEROSIS: ICD-10-CM

## 2024-10-18 DIAGNOSIS — E78.2 MIXED HYPERLIPIDEMIA: ICD-10-CM

## 2024-10-18 PROCEDURE — 99214 OFFICE O/P EST MOD 30 MIN: CPT

## 2024-10-21 ENCOUNTER — TRANSCRIPTION ENCOUNTER (OUTPATIENT)
Age: 69
End: 2024-10-21

## 2024-10-21 LAB
25(OH)D3 SERPL-MCNC: 31.7 NG/ML
ALBUMIN SERPL ELPH-MCNC: 4.3 G/DL
ALP BLD-CCNC: 68 U/L
ALT SERPL-CCNC: 22 U/L
ANION GAP SERPL CALC-SCNC: 12 MMOL/L
AST SERPL-CCNC: 18 U/L
BILIRUB SERPL-MCNC: 0.7 MG/DL
BUN SERPL-MCNC: 14 MG/DL
CALCIUM SERPL-MCNC: 9.7 MG/DL
CHLORIDE SERPL-SCNC: 100 MMOL/L
CO2 SERPL-SCNC: 25 MMOL/L
CREAT SERPL-MCNC: 0.95 MG/DL
EGFR: 86 ML/MIN/1.73M2
ESTIMATED AVERAGE GLUCOSE: 123 MG/DL
GLUCOSE SERPL-MCNC: 100 MG/DL
HBA1C MFR BLD HPLC: 5.9 %
POTASSIUM SERPL-SCNC: 4.6 MMOL/L
PROT SERPL-MCNC: 7.1 G/DL
SODIUM SERPL-SCNC: 137 MMOL/L

## 2024-10-29 ENCOUNTER — APPOINTMENT (OUTPATIENT)
Dept: GASTROENTEROLOGY | Facility: CLINIC | Age: 69
End: 2024-10-29
Payer: MEDICARE

## 2024-10-29 VITALS
BODY MASS INDEX: 27.62 KG/M2 | SYSTOLIC BLOOD PRESSURE: 133 MMHG | HEART RATE: 73 BPM | WEIGHT: 176 LBS | HEIGHT: 67 IN | DIASTOLIC BLOOD PRESSURE: 73 MMHG

## 2024-10-29 DIAGNOSIS — Z80.0 FAMILY HISTORY OF MALIGNANT NEOPLASM OF DIGESTIVE ORGANS: ICD-10-CM

## 2024-10-29 DIAGNOSIS — Z83.719 FAMILY HISTORY OF COLON POLYPS, UNSPECIFIED: ICD-10-CM

## 2024-10-29 DIAGNOSIS — R15.9 FULL INCONTINENCE OF FECES: ICD-10-CM

## 2024-10-29 DIAGNOSIS — Z86.0100 PERSONAL HISTORY OF COLON POLYPS, UNSPECIFIED: ICD-10-CM

## 2024-10-29 DIAGNOSIS — R19.5 OTHER FECAL ABNORMALITIES: ICD-10-CM

## 2024-10-29 DIAGNOSIS — R12 HEARTBURN: ICD-10-CM

## 2024-10-29 PROCEDURE — 82272 OCCULT BLD FECES 1-3 TESTS: CPT

## 2024-10-29 PROCEDURE — 99204 OFFICE O/P NEW MOD 45 MIN: CPT | Mod: 25

## 2024-10-29 RX ORDER — PSYLLIUM SEED
PACKET (EA) ORAL
Refills: 0 | Status: ACTIVE | COMMUNITY

## 2024-12-24 PROBLEM — F10.90 ALCOHOL USE: Status: ACTIVE | Noted: 2021-05-04

## 2025-01-08 ENCOUNTER — APPOINTMENT (OUTPATIENT)
Dept: PULMONOLOGY | Facility: CLINIC | Age: 70
End: 2025-01-08
Payer: MEDICARE

## 2025-01-08 VITALS
RESPIRATION RATE: 12 BRPM | HEART RATE: 74 BPM | DIASTOLIC BLOOD PRESSURE: 70 MMHG | WEIGHT: 176 LBS | OXYGEN SATURATION: 99 % | SYSTOLIC BLOOD PRESSURE: 112 MMHG | BODY MASS INDEX: 27.62 KG/M2 | HEIGHT: 67 IN

## 2025-01-08 DIAGNOSIS — G47.33 OBSTRUCTIVE SLEEP APNEA (ADULT) (PEDIATRIC): ICD-10-CM

## 2025-01-08 PROCEDURE — 99212 OFFICE O/P EST SF 10 MIN: CPT

## 2025-01-13 ENCOUNTER — APPOINTMENT (OUTPATIENT)
Dept: GASTROENTEROLOGY | Facility: CLINIC | Age: 70
End: 2025-01-13
Payer: MEDICARE

## 2025-01-13 PROCEDURE — 45378 DIAGNOSTIC COLONOSCOPY: CPT

## 2025-01-29 ENCOUNTER — RX RENEWAL (OUTPATIENT)
Age: 70
End: 2025-01-29

## 2025-03-03 ENCOUNTER — NON-APPOINTMENT (OUTPATIENT)
Age: 70
End: 2025-03-03

## 2025-03-03 ENCOUNTER — APPOINTMENT (OUTPATIENT)
Dept: CARDIOLOGY | Facility: CLINIC | Age: 70
End: 2025-03-03
Payer: MEDICARE

## 2025-03-03 VITALS
BODY MASS INDEX: 27.31 KG/M2 | OXYGEN SATURATION: 97 % | HEIGHT: 67 IN | RESPIRATION RATE: 14 BRPM | DIASTOLIC BLOOD PRESSURE: 73 MMHG | HEART RATE: 74 BPM | WEIGHT: 174 LBS | SYSTOLIC BLOOD PRESSURE: 111 MMHG

## 2025-03-03 DIAGNOSIS — I25.10 ATHEROSCLEROTIC HEART DISEASE OF NATIVE CORONARY ARTERY W/OUT ANGINA PECTORIS: ICD-10-CM

## 2025-03-03 DIAGNOSIS — I10 ESSENTIAL (PRIMARY) HYPERTENSION: ICD-10-CM

## 2025-03-03 DIAGNOSIS — G47.33 OBSTRUCTIVE SLEEP APNEA (ADULT) (PEDIATRIC): ICD-10-CM

## 2025-03-03 DIAGNOSIS — R07.89 OTHER CHEST PAIN: ICD-10-CM

## 2025-03-03 PROCEDURE — 93000 ELECTROCARDIOGRAM COMPLETE: CPT

## 2025-03-03 PROCEDURE — 99214 OFFICE O/P EST MOD 30 MIN: CPT

## 2025-03-03 PROCEDURE — G2211 COMPLEX E/M VISIT ADD ON: CPT

## 2025-03-05 DIAGNOSIS — D56.3 THALASSEMIA MINOR: ICD-10-CM

## 2025-03-05 LAB
25(OH)D3 SERPL-MCNC: 35.1 NG/ML
ALBUMIN SERPL ELPH-MCNC: 4.4 G/DL
ALP BLD-CCNC: 77 U/L
ALT SERPL-CCNC: 17 U/L
ANION GAP SERPL CALC-SCNC: 13 MMOL/L
AST SERPL-CCNC: 18 U/L
BILIRUB SERPL-MCNC: 0.7 MG/DL
BUN SERPL-MCNC: 15 MG/DL
CALCIUM SERPL-MCNC: 9.7 MG/DL
CHLORIDE SERPL-SCNC: 102 MMOL/L
CHOLEST SERPL-MCNC: 129 MG/DL
CO2 SERPL-SCNC: 26 MMOL/L
CREAT SERPL-MCNC: 0.98 MG/DL
EGFRCR SERPLBLD CKD-EPI 2021: 82 ML/MIN/1.73M2
ESTIMATED AVERAGE GLUCOSE: 120 MG/DL
GLUCOSE SERPL-MCNC: 89 MG/DL
HBA1C MFR BLD HPLC: 5.8 %
HCT VFR BLD CALC: 43.1 %
HDLC SERPL-MCNC: 56 MG/DL
HGB BLD-MCNC: 13.1 G/DL
LDLC SERPL CALC-MCNC: 56 MG/DL
MCHC RBC-ENTMCNC: 19.5 PG
MCHC RBC-ENTMCNC: 30.4 G/DL
MCV RBC AUTO: 64.2 FL
NONHDLC SERPL-MCNC: 73 MG/DL
PLATELET # BLD AUTO: 200 K/UL
POTASSIUM SERPL-SCNC: 4.8 MMOL/L
PROT SERPL-MCNC: 6.9 G/DL
RBC # BLD: 6.71 M/UL
RBC # FLD: 19.2 %
SODIUM SERPL-SCNC: 141 MMOL/L
TRIGL SERPL-MCNC: 91 MG/DL
WBC # FLD AUTO: 5.89 K/UL

## 2025-03-25 ENCOUNTER — TRANSCRIPTION ENCOUNTER (OUTPATIENT)
Age: 70
End: 2025-03-25

## 2025-03-26 ENCOUNTER — RX RENEWAL (OUTPATIENT)
Age: 70
End: 2025-03-26

## 2025-05-08 ENCOUNTER — RX RENEWAL (OUTPATIENT)
Age: 70
End: 2025-05-08

## 2025-05-14 ENCOUNTER — APPOINTMENT (OUTPATIENT)
Dept: CARDIOLOGY | Facility: CLINIC | Age: 70
End: 2025-05-14
Payer: MEDICARE

## 2025-05-14 PROCEDURE — 93306 TTE W/DOPPLER COMPLETE: CPT

## 2025-05-14 PROCEDURE — 93015 CV STRESS TEST SUPVJ I&R: CPT

## 2025-05-22 ENCOUNTER — APPOINTMENT (OUTPATIENT)
Dept: INTERNAL MEDICINE | Facility: CLINIC | Age: 70
End: 2025-05-22
Payer: MEDICARE

## 2025-05-22 DIAGNOSIS — U07.1 COVID-19: ICD-10-CM

## 2025-05-22 PROCEDURE — 99214 OFFICE O/P EST MOD 30 MIN: CPT | Mod: 2W

## 2025-05-22 RX ORDER — NIRMATRELVIR AND RITONAVIR 300-100 MG
20 X 150 MG & KIT ORAL
Qty: 30 | Refills: 0 | Status: ACTIVE | COMMUNITY
Start: 2025-05-22 | End: 1900-01-01

## 2025-05-22 RX ORDER — AZITHROMYCIN 250 MG/1
250 TABLET, FILM COATED ORAL
Qty: 1 | Refills: 0 | Status: ACTIVE | COMMUNITY
Start: 2025-05-22 | End: 1900-01-01

## 2025-07-21 ENCOUNTER — RX RENEWAL (OUTPATIENT)
Age: 70
End: 2025-07-21

## 2025-08-21 ENCOUNTER — LABORATORY RESULT (OUTPATIENT)
Age: 70
End: 2025-08-21

## 2025-08-21 ENCOUNTER — APPOINTMENT (OUTPATIENT)
Dept: INTERNAL MEDICINE | Facility: CLINIC | Age: 70
End: 2025-08-21
Payer: MEDICARE

## 2025-08-21 VITALS
SYSTOLIC BLOOD PRESSURE: 110 MMHG | HEART RATE: 97 BPM | WEIGHT: 172 LBS | RESPIRATION RATE: 14 BRPM | OXYGEN SATURATION: 100 % | TEMPERATURE: 97.3 F | BODY MASS INDEX: 27 KG/M2 | DIASTOLIC BLOOD PRESSURE: 73 MMHG | HEIGHT: 67 IN

## 2025-08-21 DIAGNOSIS — H50.15 ALTERNATING EXOTROPIA: ICD-10-CM

## 2025-08-21 DIAGNOSIS — I10 ESSENTIAL (PRIMARY) HYPERTENSION: ICD-10-CM

## 2025-08-21 DIAGNOSIS — E78.00 PURE HYPERCHOLESTEROLEMIA, UNSPECIFIED: ICD-10-CM

## 2025-08-21 DIAGNOSIS — Z00.00 ENCOUNTER FOR GENERAL ADULT MEDICAL EXAMINATION W/OUT ABNORMAL FINDINGS: ICD-10-CM

## 2025-08-21 LAB
25(OH)D3 SERPL-MCNC: 31.5 NG/ML
ALBUMIN SERPL ELPH-MCNC: 4.3 G/DL
ALP BLD-CCNC: 72 U/L
ALT SERPL-CCNC: 15 U/L
ANION GAP SERPL CALC-SCNC: 12 MMOL/L
APPEARANCE: CLEAR
AST SERPL-CCNC: 14 U/L
BILIRUB SERPL-MCNC: 0.8 MG/DL
BILIRUBIN URINE: NEGATIVE
BLOOD URINE: NEGATIVE
BUN SERPL-MCNC: 18 MG/DL
CALCIUM SERPL-MCNC: 9.4 MG/DL
CHLORIDE SERPL-SCNC: 103 MMOL/L
CHOLEST SERPL-MCNC: 126 MG/DL
CO2 SERPL-SCNC: 25 MMOL/L
COLOR: YELLOW
CREAT SERPL-MCNC: 0.92 MG/DL
EGFRCR SERPLBLD CKD-EPI 2021: 89 ML/MIN/1.73M2
ESTIMATED AVERAGE GLUCOSE: 117 MG/DL
GLUCOSE QUALITATIVE U: NEGATIVE MG/DL
GLUCOSE SERPL-MCNC: 96 MG/DL
HBA1C MFR BLD HPLC: 5.7 %
HDLC SERPL-MCNC: 50 MG/DL
KETONES URINE: NEGATIVE MG/DL
LDLC SERPL-MCNC: 61 MG/DL
LEUKOCYTE ESTERASE URINE: NEGATIVE
NITRITE URINE: NEGATIVE
NONHDLC SERPL-MCNC: 76 MG/DL
PH URINE: 6
POTASSIUM SERPL-SCNC: 4.6 MMOL/L
PROT SERPL-MCNC: 6.8 G/DL
PROTEIN URINE: NEGATIVE MG/DL
PSA SERPL-MCNC: 8.19 NG/ML
SODIUM SERPL-SCNC: 140 MMOL/L
SPECIFIC GRAVITY URINE: 1.01
TRIGL SERPL-MCNC: 74 MG/DL
TSH SERPL-ACNC: 0.77 UIU/ML
UROBILINOGEN URINE: 0.2 MG/DL
VIT B12 SERPL-MCNC: 950 PG/ML

## 2025-08-21 PROCEDURE — G0439: CPT

## 2025-08-23 LAB
BASOPHILS # BLD AUTO: 0.28 K/UL
BASOPHILS NFR BLD AUTO: 4.4 %
EOSINOPHIL # BLD AUTO: 0.42 K/UL
EOSINOPHIL NFR BLD AUTO: 6.7 %
HCT VFR BLD CALC: 40.1 %
HGB BLD-MCNC: 12.1 G/DL
LYMPHOCYTES # BLD AUTO: 0.7 K/UL
LYMPHOCYTES NFR BLD AUTO: 11.1 %
MAN DIFF?: NORMAL
MCHC RBC-ENTMCNC: 19.3 PG
MCHC RBC-ENTMCNC: 30.2 G/DL
MCV RBC AUTO: 64.1 FL
MONOCYTES # BLD AUTO: 0 K/UL
MONOCYTES NFR BLD AUTO: 0 %
NEUTROPHILS # BLD AUTO: 4.78 K/UL
NEUTROPHILS NFR BLD AUTO: 75.6 %
PLATELET # BLD AUTO: 191 K/UL
RBC # BLD: 6.26 M/UL
RBC # FLD: 18.5 %
WBC # FLD AUTO: 6.32 K/UL

## 2025-09-05 DIAGNOSIS — R79.89 OTHER SPECIFIED ABNORMAL FINDINGS OF BLOOD CHEMISTRY: ICD-10-CM

## 2025-09-08 ENCOUNTER — LABORATORY RESULT (OUTPATIENT)
Age: 70
End: 2025-09-08

## 2025-09-09 LAB
BASOPHILS # BLD AUTO: 0.07 K/UL
BASOPHILS NFR BLD AUTO: 1 %
EOSINOPHIL # BLD AUTO: 0.21 K/UL
EOSINOPHIL NFR BLD AUTO: 3.1 %
HCT VFR BLD CALC: 42.5 %
HGB BLD-MCNC: 12.7 G/DL
IMM GRANULOCYTES NFR BLD AUTO: 0.3 %
LYMPHOCYTES # BLD AUTO: 1.47 K/UL
LYMPHOCYTES NFR BLD AUTO: 22 %
MAN DIFF?: NORMAL
MCHC RBC-ENTMCNC: 19.3 PG
MCHC RBC-ENTMCNC: 29.9 G/DL
MCV RBC AUTO: 64.6 FL
MONOCYTES # BLD AUTO: 0.53 K/UL
MONOCYTES NFR BLD AUTO: 7.9 %
NEUTROPHILS # BLD AUTO: 4.37 K/UL
NEUTROPHILS NFR BLD AUTO: 65.7 %
PLATELET # BLD AUTO: 205 K/UL
RBC # BLD: 6.58 M/UL
RBC # FLD: 19 %
WBC # FLD AUTO: 6.67 K/UL

## (undated) DEVICE — SUT MONOCRYL 5-0 18" P-3 UNDYED

## (undated) DEVICE — TOURNIQUET CUFF 18" DUAL PORT SINGLE BLADDER W PLC  (BLACK)

## (undated) DEVICE — GLV 8.5 PROTEXIS (WHITE)

## (undated) DEVICE — DRAPE 3/4 SHEET 52X76"

## (undated) DEVICE — WARMING BLANKET FULL ADULT

## (undated) DEVICE — SPECIMEN CONTAINER PET

## (undated) DEVICE — WARMING BLANKET LOWER ADULT

## (undated) DEVICE — VISITEC 4X4

## (undated) DEVICE — TROCAR COVIDIEN VERSASTEP PLUS 12MM STANDARD

## (undated) DEVICE — VENODYNE/SCD SLEEVE CALF MEDIUM

## (undated) DEVICE — GLV 6.5 PROTEXIS (WHITE)

## (undated) DEVICE — SUT SOFSILK 3-0 30" V-20

## (undated) DEVICE — WARMING BLANKET UPPER ADULT

## (undated) DEVICE — DRAPE MAYO STAND 30"

## (undated) DEVICE — POSITIONER FOAM EGG CRATE ULNAR 2PCS (PINK)

## (undated) DEVICE — DRSG OPSITE 13.75 X 4"

## (undated) DEVICE — BLADE SCALPEL SAFETYLOCK #15

## (undated) DEVICE — TUBING SUCTION 20FT

## (undated) DEVICE — DRAPE MAGNETIC INSTRUMENT MEDIUM

## (undated) DEVICE — FOLEY TRAY 16FR 5CC LTX UMETER CLOSED

## (undated) DEVICE — ENDOCATCH 10MM SPECIMEN POUCH

## (undated) DEVICE — GLV 7.5 PROTEXIS (WHITE)

## (undated) DEVICE — GOWN TRIMAX LG

## (undated) DEVICE — FOLEY CATH 2-WAY 16FR 5CC LATEX COUDE RED

## (undated) DEVICE — DRAPE 1/2 SHEET 40X57"

## (undated) DEVICE — FOLEY HOLDER STATLOCK 2 WAY ADULT

## (undated) DEVICE — SPECIMEN CONTAINER 100ML

## (undated) DEVICE — TROCAR COVIDIEN VERSAONE BLADELESS FIXATION 12MM STANDARD

## (undated) DEVICE — STAPLER SKIN VISI-STAT 35 WIDE

## (undated) DEVICE — SHEARS COVIDIEN ENDO SHEAR 5MM X 31CM W UNIPOLAR CAUTERY

## (undated) DEVICE — DRSG STERISTRIPS 0.5 X 4"

## (undated) DEVICE — DRSG KLING 4"

## (undated) DEVICE — BLADE SCALPEL SAFETYLOCK #10

## (undated) DEVICE — SYR ASEPTO

## (undated) DEVICE — SUT MONOSOF 5-0 18" P-12

## (undated) DEVICE — PREP CHLORAPREP HI-LITE ORANGE 26ML

## (undated) DEVICE — DRSG TEGADERM 6"X8"

## (undated) DEVICE — DRAIN JACKSON PRATT 10MM FLAT FULL NO TROCAR

## (undated) DEVICE — SHEARS HARMONIC ACE 5MM X 36CM CURVED TIP

## (undated) DEVICE — TROCAR COVIDIEN BLUNT TIP HASSAN 10MM

## (undated) DEVICE — DRAPE TOWEL BLUE 17" X 24"

## (undated) DEVICE — DRSG WEBRIL 3"

## (undated) DEVICE — LIGASURE IMPACT

## (undated) DEVICE — DRAPE LIGHT HANDLE COVER (BLUE)

## (undated) DEVICE — SUT POLYSORB 0 36" GU-46

## (undated) DEVICE — ELCTR BOVIE PENCIL HANDPIECE

## (undated) DEVICE — PACK UPPER EXTREMITY

## (undated) DEVICE — TOURNIQUET ESMARK 4"

## (undated) DEVICE — SCOPE WARMER SEAL DISP

## (undated) DEVICE — POSITIONER FOAM HEAD CRADLE (PINK)

## (undated) DEVICE — POSITIONER STRAP ARMBOARD VELCRO TS-30

## (undated) DEVICE — SOL IRR POUR NS 0.9% 500ML

## (undated) DEVICE — SI ELCTR SEALER DA VINCI VESSEL

## (undated) DEVICE — DRSG XEROFORM 1 X 8"

## (undated) DEVICE — DRAIN RESERVOIR FOR JACKSON PRATT 100CC CARDINAL

## (undated) DEVICE — PACK ADVANCED LAPAROSCOPIC NS

## (undated) DEVICE — GLV 7 PROTEXIS (WHITE)

## (undated) DEVICE — SOL IRR POUR H2O 250ML

## (undated) DEVICE — NDL COUNTER FOAM AND MAGNET 40-70

## (undated) DEVICE — APPLICATOR VISTASEAL LAP DUAL 35CM RIGID

## (undated) DEVICE — SUT BIOSYN 4-0 18" P-12

## (undated) DEVICE — DRAPE INSTRUMENT POUCH 6.75" X 11"

## (undated) DEVICE — SOL IRR POUR H2O 1500ML

## (undated) DEVICE — GLV 7.5 PROTEXIS (BLUE)

## (undated) DEVICE — TUBING INSUFFLATION LAP FILTER 10FT

## (undated) DEVICE — TAPE SILK 3"

## (undated) DEVICE — MARKING PEN W RULER

## (undated) DEVICE — LIGASURE MARYLAND 37CM

## (undated) DEVICE — ELCTR HEX BLADE

## (undated) DEVICE — LIGASURE ATLAS 10MM 37CM

## (undated) DEVICE — DRAPE GENERAL ENDOSCOPY

## (undated) DEVICE — INSUFFLATION NDL COVIDIEN STEP 14G FOR STEP/VERSASTEP

## (undated) DEVICE — MEDICATION LABELS W MARKER

## (undated) DEVICE — SUCTION YANKAUER NO CONTROL VENT

## (undated) DEVICE — GLV 8 PROTEXIS (WHITE)

## (undated) DEVICE — NDL HYPO REGULAR BEVEL 25G X 1.5" (BLUE)

## (undated) DEVICE — STAPLER COVIDIEN ENDO GIA STANDARD HANDLE

## (undated) DEVICE — LAP PAD 18 X 18"

## (undated) DEVICE — DISSECTOR KITTNER 5 MM TIP